# Patient Record
Sex: FEMALE | Race: WHITE | NOT HISPANIC OR LATINO | Employment: OTHER | URBAN - METROPOLITAN AREA
[De-identification: names, ages, dates, MRNs, and addresses within clinical notes are randomized per-mention and may not be internally consistent; named-entity substitution may affect disease eponyms.]

---

## 2017-04-11 ENCOUNTER — OFFICE VISIT (OUTPATIENT)
Dept: CARDIOLOGY | Facility: CLINIC | Age: 67
End: 2017-04-11

## 2017-04-11 VITALS
WEIGHT: 261.9 LBS | BODY MASS INDEX: 48.2 KG/M2 | SYSTOLIC BLOOD PRESSURE: 124 MMHG | HEART RATE: 52 BPM | DIASTOLIC BLOOD PRESSURE: 70 MMHG | HEIGHT: 62 IN

## 2017-04-11 DIAGNOSIS — I10 ESSENTIAL HYPERTENSION: ICD-10-CM

## 2017-04-11 DIAGNOSIS — R00.1 SINUS BRADYCARDIA: Primary | ICD-10-CM

## 2017-04-11 DIAGNOSIS — R60.9 LIPOEDEMA: ICD-10-CM

## 2017-04-11 DIAGNOSIS — E66.01 MORBID OBESITY WITH BMI OF 45.0-49.9, ADULT (HCC): ICD-10-CM

## 2017-04-11 PROCEDURE — 93000 ELECTROCARDIOGRAM COMPLETE: CPT | Performed by: INTERNAL MEDICINE

## 2017-04-11 PROCEDURE — 99213 OFFICE O/P EST LOW 20 MIN: CPT | Performed by: INTERNAL MEDICINE

## 2017-04-11 RX ORDER — LISINOPRIL 2.5 MG/1
TABLET ORAL 2 TIMES DAILY
COMMUNITY
Start: 2017-01-23 | End: 2017-11-02

## 2017-04-11 RX ORDER — LEVOTHYROXINE SODIUM 112 UG/1
TABLET ORAL DAILY
Status: ON HOLD | COMMUNITY
Start: 2017-01-23 | End: 2017-11-17

## 2017-04-11 NOTE — PROGRESS NOTES
Date of Office Visit: 2017  Encounter Provider: Mir Martinez MD  Place of Service: [facility] CARDIOLOGY  Patient Name: Norma Ro  :1950    Chief Complaint   Patient presents with   • Hypertension   :     HPI: Norma Ro is a 66 y.o. female who presents today to follow-up.  She saw Dr. Adhikari in 2014 for preoperative risk assessment prior to hernia repair; he recommended no testing as she was aysmptomatic. Her pulse was 66 bpm at that time.     I met her in 2016 for the evaluation of sinus bradycardia.  She has hypothyroidism, in in 2016 she was hyperthyroid on her levothyroxine. The dose was decreased and her TSH jumped to 21 in mid 2016. Around that time, she also became concerned about her heart rate; a Holter monitor showed an average pulse of 58 bpm, with a range of 38-94. There were extremely rare ectopics, and no pauses.      She was then started on lisinopril for hypertension in 2016.  She has CKD and follows with Dr. Levine for this.     She continues to check her BP and HR regularly at home.  Her SBP ranges from 112 - 159, although her average is definitely between 120 and 130 mm Hg.  Her pulse ranges from 43 to 80, with an average heart rate in the 50's.  She'll sometimes get lightheaded if she stands up quickly. She has not passed out. She denies palpitations, orthopnea, or chest pain. She has chronic mild edema but has large varicose veins (and is morbidly obese). She is dyspneic with physical activity.  She was seen in the vein care center in 2016 and she declined a venous doppler (to check for reflux).  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  Compression hose were recommended but she couldn't wear them due to leg size; she has been using Copper compression sleeves.    She is on her feet at work, for up to 9 hours a day, 3-5 days per week.  Her symptoms are stable (mild lightheadedness, leg swelling).      Past  Medical History:   Diagnosis Date   • Anemia    • Chronic kidney disease    • Depression    • GERD (gastroesophageal reflux disease)    • History of cardiovascular stress test     normal Cardiolite 9/2016   • Hypertension    • Hypoglycemia     Rebound hypoglycemia   • Hypothyroidism    • Incisional hernia     s/p surgical repair, 2014   • Morbid obesity     s/p remote gastric bypass   • Sinus bradycardia    • Varicose veins        Past Surgical History:   Procedure Laterality Date   • ANKLE ARTHROSCOPY     • CARPAL TUNNEL RELEASE     • CHOLECYSTECTOMY     • EXTERNAL FIXATOR APPLICATION     • GASTRIC BYPASS     • STOMACH SURGERY         Social History     Social History   • Marital status: Single     Spouse name: N/A   • Number of children: N/A   • Years of education: N/A     Occupational History   • Not on file.     Social History Main Topics   • Smoking status: Never Smoker   • Smokeless tobacco: Never Used   • Alcohol use No   • Drug use: No   • Sexual activity: Not on file     Other Topics Concern   • Not on file     Social History Narrative       Family History   Problem Relation Age of Onset   • Aortic aneurysm Father      abdominal   • Heart disease Father    • Hypertension Brother    • Stroke Brother    • Heart disease Maternal Grandfather    • Stroke Paternal Grandmother    • Heart disease Paternal Grandfather        Review of Systems   Cardiovascular: Positive for leg swelling.   Musculoskeletal: Positive for back pain.   Neurological: Positive for light-headedness.   All other systems reviewed and are negative.      Allergies   Allergen Reactions   • Morphine Itching   • Baclofen Dizziness and Arrhythmia   • Etodolac Hives   • Metronidazole Other (See Comments)     headaches   • Nitrofurantoin Other (See Comments)     headaches   • Tetracyclines & Related Hives         Current Outpatient Prescriptions:   •  B Complex-C-E-Zn (B COMPLEX-C-E-ZINC) tablet, Take 1 tablet by mouth daily., Disp: , Rfl:   •   "Cholecalciferol (VITAMIN D PO), Take 50,000 Int'l Units by mouth see administration instructions. Take 1x a month, Disp: , Rfl:   •  cyclobenzaprine (FLEXERIL) 10 MG tablet, Take 10 mg by mouth every 8 (eight) hours. As needed, Disp: , Rfl:   •  diphenhydrAMINE (BENADRYL) 25 mg capsule, Take 25 mg by mouth every 6 (six) hours as needed for itching., Disp: , Rfl:   •  escitalopram (LEXAPRO) 10 MG tablet, Take 20 mg by mouth every night., Disp: , Rfl:   •  famotidine (PEPCID) 10 MG tablet, Take 10 mg by mouth 2 (Two) Times a Day As Needed for heartburn., Disp: , Rfl:   •  levothyroxine (SYNTHROID, LEVOTHROID) 112 MCG tablet, Daily., Disp: , Rfl:   •  levothyroxine (SYNTHROID, LEVOTHROID) 75 MCG tablet, Take 1 tablet by mouth daily., Disp: 30 tablet, Rfl: 1  •  lisinopril (PRINIVIL,ZESTRIL) 2.5 MG tablet, 2 (Two) Times a Day., Disp: , Rfl:   •  Multiple Vitamin (MULTI-VITAMIN PO), Take  by mouth., Disp: , Rfl:   •  oxybutynin (DITROPAN) 5 MG tablet, Take 5 mg by mouth every night., Disp: , Rfl:   •  Red Yeast Rice Extract 600 MG capsule, Take 600 mg by mouth daily., Disp: , Rfl:   •  traMADol (ULTRAM) 50 MG tablet, Take 50 mg by mouth every 8 (eight) hours as needed., Disp: , Rfl:      Objective:     Vitals:    04/11/17 1005   BP: 124/70   Pulse: 52   Weight: 261 lb 14.4 oz (119 kg)   Height: 62\" (157.5 cm)     Body mass index is 47.9 kg/(m^2).    Physical Exam   Constitutional: She is oriented to person, place, and time.   Obese   HENT:   Head: Normocephalic.   Nose: Nose normal.   Mouth/Throat: Oropharynx is clear and moist.   Eyes: Conjunctivae and EOM are normal. Pupils are equal, round, and reactive to light.   Neck: Normal range of motion.   Cannot assess for JVD due to habitus   Cardiovascular: Regular rhythm, normal heart sounds and intact distal pulses.  Bradycardia present.    No murmur heard.  Pulmonary/Chest: Effort normal.   Abdominal: Soft.   Obesity limits abdominal exam   Musculoskeletal: Normal range " of motion. She exhibits edema (lipedema, no pitting).   Neurological: She is alert and oriented to person, place, and time. No cranial nerve deficit.   Skin: Skin is warm and dry. No rash noted.   Psychiatric: She has a normal mood and affect. Her behavior is normal. Judgment and thought content normal.   Vitals reviewed.        ECG 12 Lead  Date/Time: 4/11/2017 2:01 PM  Performed by: MIR MARTINEZ  Authorized by: MIR MARTINEZ   Comparison: compared with previous ECG   Similar to previous ECG  Rhythm: sinus bradycardia  Conduction: conduction normal  ST Segments: ST segments normal  T Waves: T waves normal  QRS axis: normal  Other: no other findings  Clinical impression: normal ECG              Assessment:       Diagnosis Plan   1. Sinus bradycardia     2. Essential hypertension     3. Lipoedema            Plan:       1.  She has relatively mild sinus bradycardia without symptoms.  She still has good heart rate variability.  She does not require a pacemaker at this point.  She may in the future, as she likely has some degree of sick sinus syndrome, but she doesn't at this time.    2.  Her BP is generally controlled; I will defer this to Dr. Levine.    3.  Her leg swelling is nonpitting and is due to morbid obesity, lipedema, and probable venous insufficiency.  She has been wearing her compression, which I think is the best option for her.      Sincerely,       Mir Martinez MD

## 2017-10-17 ENCOUNTER — OFFICE VISIT (OUTPATIENT)
Dept: CARDIOLOGY | Facility: CLINIC | Age: 67
End: 2017-10-17

## 2017-10-17 VITALS
DIASTOLIC BLOOD PRESSURE: 92 MMHG | HEIGHT: 62 IN | BODY MASS INDEX: 47.48 KG/M2 | SYSTOLIC BLOOD PRESSURE: 130 MMHG | WEIGHT: 258 LBS | HEART RATE: 52 BPM

## 2017-10-17 DIAGNOSIS — R06.09 DYSPNEA ON EXERTION: ICD-10-CM

## 2017-10-17 DIAGNOSIS — I10 ESSENTIAL HYPERTENSION: ICD-10-CM

## 2017-10-17 DIAGNOSIS — R00.1 SINUS BRADYCARDIA: ICD-10-CM

## 2017-10-17 DIAGNOSIS — R07.89 ATYPICAL CHEST PAIN: Primary | ICD-10-CM

## 2017-10-17 PROCEDURE — 93000 ELECTROCARDIOGRAM COMPLETE: CPT | Performed by: INTERNAL MEDICINE

## 2017-10-17 PROCEDURE — 99214 OFFICE O/P EST MOD 30 MIN: CPT | Performed by: INTERNAL MEDICINE

## 2017-10-17 RX ORDER — GREEN TEA/HOODIA GORDONII 315-12.5MG
CAPSULE ORAL
COMMUNITY
Start: 2017-05-15 | End: 2018-10-23

## 2017-10-17 RX ORDER — ESCITALOPRAM OXALATE 20 MG/1
TABLET ORAL DAILY
COMMUNITY
Start: 2017-09-29 | End: 2019-05-22

## 2017-10-17 NOTE — PROGRESS NOTES
"Date of Office Visit: 2017  Encounter Provider: Mir Martinez MD  Place of Service: [facility] CARDIOLOGY  Patient Name: Norma Ro  :1950    Chief Complaint   Patient presents with   • Slow Heart Rate      6 MONTH FOLLOW UP    • Chest Pain     \"LAST 5 -10 MIN\"   :     HPI: Norma Ro is a 67 y.o. female who presents today to follow-up.  She initially saw Dr. Adhikari in 2014 for preoperative risk assessment prior to hernia repair; he recommended no testing as she was aysmptomatic. Her pulse was 66 bpm at that time.     I met her in 2016 for the evaluation of sinus bradycardia.  She has hypothyroidism, and in 2016 she was hyperthyroid on her levothyroxine. The dose was decreased and her TSH jumped to 21 in mid 2016. Around that time, she also became concerned about her heart rate; a Holter monitor showed an average pulse of 58 bpm, with a range of 38-94. There were extremely rare ectopics, and no pauses.      She was then started on lisinopril for hypertension in 2016.  She has CKD and follows with Dr. Levine for this.     She'll sometimes get lightheaded if she stands up quickly. She has not passed out. She denies palpitations, orthopnea, or chest pain. She has chronic mild edema but has large varicose veins (and is morbidly obese). She was seen in the vein care center in 2016 and she declined a venous doppler (to check for reflux).  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  Compression hose were recommended but she couldn't wear them due to leg size; she has been using Copper compression sleeves.    She has chronic dyspnea with minimal activity but this is unchanged. She had an episode of intense chest pain last week that was in the right-to-mid chest and radiated around the right side to her back.  It lasted 5-10 minutes and went away.  She can only describe it as a \"pain\" but it was fairly severe.  It went away on its " own.    Past Medical History:   Diagnosis Date   • Anemia    • Chronic kidney disease    • Depression    • GERD (gastroesophageal reflux disease)    • History of cardiovascular stress test     normal Cardiolite 9/2016   • Hypertension    • Hypoglycemia     Rebound hypoglycemia   • Hypothyroidism    • Incisional hernia     s/p surgical repair, 2014   • Morbid obesity     s/p remote gastric bypass   • Sinus bradycardia    • Varicose veins        Past Surgical History:   Procedure Laterality Date   • ANKLE ARTHROSCOPY     • CARPAL TUNNEL RELEASE     • CHOLECYSTECTOMY     • EXTERNAL FIXATOR APPLICATION     • GASTRIC BYPASS     • STOMACH SURGERY         Social History     Social History   • Marital status: Single     Spouse name: N/A   • Number of children: N/A   • Years of education: N/A     Occupational History   • Not on file.     Social History Main Topics   • Smoking status: Never Smoker   • Smokeless tobacco: Never Used   • Alcohol use No   • Drug use: No   • Sexual activity: Not on file     Other Topics Concern   • Not on file     Social History Narrative       Family History   Problem Relation Age of Onset   • Aortic aneurysm Father      abdominal   • Heart disease Father    • Hypertension Brother    • Stroke Brother    • Heart disease Maternal Grandfather    • Stroke Paternal Grandmother    • Heart disease Paternal Grandfather        Review of Systems   Cardiovascular: Positive for chest pain and leg swelling.   Respiratory: Positive for shortness of breath.    Musculoskeletal: Positive for back pain.   Neurological: Positive for light-headedness.   All other systems reviewed and are negative.      Allergies   Allergen Reactions   • Morphine Itching   • Baclofen Dizziness and Arrhythmia   • Etodolac Hives   • Metronidazole Other (See Comments)     headaches   • Nitrofurantoin Other (See Comments)     headaches   • Other      LOTRIN     • Tetracyclines & Related Hives         Current Outpatient Prescriptions:   •  " B Complex-C-E-Zn (B COMPLEX-C-E-ZINC) tablet, Take 1 tablet by mouth daily., Disp: , Rfl:   •  Cholecalciferol (VITAMIN D PO), Take 50,000 Int'l Units by mouth see administration instructions. Take 1x a month, Disp: , Rfl:   •  cyclobenzaprine (FLEXERIL) 10 MG tablet, Take 10 mg by mouth every 8 (eight) hours. As needed, Disp: , Rfl:   •  diphenhydrAMINE (BENADRYL) 25 mg capsule, Take 25 mg by mouth every 6 (six) hours as needed for itching., Disp: , Rfl:   •  escitalopram (LEXAPRO) 20 MG tablet, Daily., Disp: , Rfl:   •  famotidine (PEPCID) 10 MG tablet, Take 10 mg by mouth 2 (Two) Times a Day As Needed for heartburn., Disp: , Rfl:   •  levothyroxine (SYNTHROID, LEVOTHROID) 112 MCG tablet, Daily., Disp: , Rfl:   •  lisinopril (PRINIVIL,ZESTRIL) 2.5 MG tablet, 2 (Two) Times a Day., Disp: , Rfl:   •  Multiple Vitamin (MULTI-VITAMIN PO), Take  by mouth., Disp: , Rfl:   •  Multiple Vitamins-Minerals (HAIR/SKIN/NAILS/BIOTIN) tablet, , Disp: , Rfl:   •  oxybutynin (DITROPAN) 5 MG tablet, Take 5 mg by mouth every night., Disp: , Rfl:   •  Red Yeast Rice Extract 600 MG capsule, Take 600 mg by mouth daily., Disp: , Rfl:   •  traMADol (ULTRAM) 50 MG tablet, Take 50 mg by mouth every 8 (eight) hours as needed., Disp: , Rfl:      Objective:     Vitals:    10/17/17 1058   BP: 130/92   BP Location: Left arm   Pulse: 52   Weight: 258 lb (117 kg)   Height: 62\" (157.5 cm)     Body mass index is 47.19 kg/(m^2).    Physical Exam   Constitutional: She is oriented to person, place, and time.   Obese   HENT:   Head: Normocephalic.   Nose: Nose normal.   Mouth/Throat: Oropharynx is clear and moist.   Eyes: Conjunctivae and EOM are normal. Pupils are equal, round, and reactive to light.   Neck: Normal range of motion.   Cannot assess for JVD due to habitus   Cardiovascular: Regular rhythm, normal heart sounds and intact distal pulses.  Bradycardia present.    No murmur heard.  Pulmonary/Chest: Effort normal.   Abdominal: Soft.   Obesity " limits abdominal exam   Musculoskeletal: Normal range of motion. She exhibits edema (lipedema, no pitting).   Neurological: She is alert and oriented to person, place, and time. No cranial nerve deficit.   Skin: Skin is warm and dry. No rash noted.   Psychiatric: She has a normal mood and affect. Her behavior is normal. Judgment and thought content normal.   Vitals reviewed.        ECG 12 Lead  Date/Time: 10/17/2017 12:46 PM  Performed by: MIR MARTINEZ  Authorized by: MIR MARTINEZ   Comparison: compared with previous ECG   Similar to previous ECG  Rhythm: sinus bradycardia  Conduction: conduction normal  ST Segments: ST segments normal  T Waves: T waves normal  QRS axis: normal  Other: no other findings  Clinical impression: normal ECG              Assessment:       Diagnosis Plan   1. Atypical chest pain  Adult Transthoracic Echo Complete W/ Cont if Necessary Per Protocol    Stress Test With Myocardial Perfusion - Two Day   2. Dyspnea on exertion  Adult Transthoracic Echo Complete W/ Cont if Necessary Per Protocol    Stress Test With Myocardial Perfusion - Two Day   3. Sinus bradycardia     4. Essential hypertension            Plan:       1.  She had one episode of chest pain at rest.  I will check a 2-day Cardiolite (of note, she had a normal Cardiolite in September 2016).    2.  She has chronic dyspnea, which is most likely multifactorial.  I will repeat an echocardiogram.    3. She has relatively mild sinus bradycardia without symptoms.  She still has good heart rate variability.  She does not require a pacemaker at this point.  She may in the future, as she likely has some degree of sick sinus syndrome, but she doesn't at this time.    4.  Her BP is generally controlled; I will defer this to Dr. Levine.  I rechecked it today and got 120/85.      Sincerely,       Mir Martinez MD

## 2017-10-20 ENCOUNTER — TELEPHONE (OUTPATIENT)
Dept: CARDIOLOGY | Facility: CLINIC | Age: 67
End: 2017-10-20

## 2017-10-20 NOTE — TELEPHONE ENCOUNTER
Scheduling called and stated that pt's insurance is wanting a peer to peer for the stress and echo. Ref#[id number] and phone is [phone].

## 2017-10-20 NOTE — TELEPHONE ENCOUNTER
I have been on hold for 20 minutes.    It says you can fax the last office note and reference # to [phone]    Let's do that.    KEVIN

## 2017-10-25 NOTE — TELEPHONE ENCOUNTER
S/w Bertha in [employer] and she said that [facility] is still denying test because she had a stress last year and they are only every 2 yrs and they want her to do a TMT instead. They will do a peer to peer if you want to try again. [phone] ref# [id number]

## 2017-10-25 NOTE — TELEPHONE ENCOUNTER
Approved.  Good thing since it's scheduled for tomorrow and the next day.  They are faxing to  office.    [id number]

## 2017-10-26 ENCOUNTER — HOSPITAL ENCOUNTER (OUTPATIENT)
Dept: NUCLEAR MEDICINE | Facility: HOSPITAL | Age: 67
Discharge: HOME | End: 2017-10-26
Attending: INTERNAL MEDICINE

## 2017-10-26 DIAGNOSIS — R07.89 ATYPICAL CHEST PAIN: ICD-10-CM

## 2017-10-26 DIAGNOSIS — R06.09 DYSPNEA ON EXERTION: ICD-10-CM

## 2017-10-26 PROCEDURE — A9500 TC99M SESTAMIBI: HCPCS | Performed by: INTERNAL MEDICINE

## 2017-10-26 PROCEDURE — 78452 HT MUSCLE IMAGE SPECT MULT: CPT | Performed by: INTERNAL MEDICINE

## 2017-10-26 PROCEDURE — 0 TECHNETIUM SESTAMIBI: Performed by: INTERNAL MEDICINE

## 2017-10-26 PROCEDURE — 93018 CV STRESS TEST I&R ONLY: CPT | Performed by: INTERNAL MEDICINE

## 2017-10-26 PROCEDURE — 93016 CV STRESS TEST SUPVJ ONLY: CPT | Performed by: INTERNAL MEDICINE

## 2017-10-26 RX ADMIN — TECHNETIUM TC-99M SESTAMIBI 1 DOSE: 1 INJECTION INTRAVENOUS at 09:30

## 2017-10-27 ENCOUNTER — HOSPITAL ENCOUNTER (OUTPATIENT)
Dept: CARDIOLOGY | Facility: HOSPITAL | Age: 67
Discharge: HOME | End: 2017-10-27
Attending: INTERNAL MEDICINE

## 2017-10-27 ENCOUNTER — APPOINTMENT (OUTPATIENT)
Dept: CARDIOLOGY | Facility: HOSPITAL | Age: 67
End: 2017-10-27
Attending: INTERNAL MEDICINE

## 2017-10-27 ENCOUNTER — HOSPITAL ENCOUNTER (OUTPATIENT)
Dept: NUCLEAR MEDICINE | Facility: HOSPITAL | Age: 67
Discharge: HOME | End: 2017-10-27
Attending: INTERNAL MEDICINE

## 2017-10-27 VITALS
BODY MASS INDEX: 47.48 KG/M2 | DIASTOLIC BLOOD PRESSURE: 73 MMHG | SYSTOLIC BLOOD PRESSURE: 154 MMHG | RESPIRATION RATE: 18 BRPM | OXYGEN SATURATION: 100 % | HEART RATE: 52 BPM | HEIGHT: 62 IN | WEIGHT: 258 LBS

## 2017-10-27 DIAGNOSIS — R06.09 DYSPNEA ON EXERTION: ICD-10-CM

## 2017-10-27 DIAGNOSIS — R07.89 ATYPICAL CHEST PAIN: ICD-10-CM

## 2017-10-27 LAB
BH CV ECHO MEAS - ACS: 2 CM
BH CV ECHO MEAS - AO MAX PG (FULL): 5.3 MMHG
BH CV ECHO MEAS - AO MAX PG: 10.6 MMHG
BH CV ECHO MEAS - AO MEAN PG (FULL): 3 MMHG
BH CV ECHO MEAS - AO MEAN PG: 6 MMHG
BH CV ECHO MEAS - AO ROOT AREA (BSA CORRECTED): 1.5
BH CV ECHO MEAS - AO ROOT AREA: 8 CM^2
BH CV ECHO MEAS - AO ROOT DIAM: 3.2 CM
BH CV ECHO MEAS - AO V2 MAX: 163 CM/SEC
BH CV ECHO MEAS - AO V2 MEAN: 110 CM/SEC
BH CV ECHO MEAS - AO V2 VTI: 39.8 CM
BH CV ECHO MEAS - AVA(I,A): 2.5 CM^2
BH CV ECHO MEAS - AVA(I,D): 2.5 CM^2
BH CV ECHO MEAS - AVA(V,A): 2.4 CM^2
BH CV ECHO MEAS - AVA(V,D): 2.4 CM^2
BH CV ECHO MEAS - BSA(HAYCOCK): 2.3 M^2
BH CV ECHO MEAS - BSA: 2.1 M^2
BH CV ECHO MEAS - BZI_BMI: 47.2 KILOGRAMS/M^2
BH CV ECHO MEAS - BZI_METRIC_HEIGHT: 157.5 CM
BH CV ECHO MEAS - BZI_METRIC_WEIGHT: 117 KG
BH CV ECHO MEAS - CONTRAST EF (2CH): 63.8 ML/M^2
BH CV ECHO MEAS - CONTRAST EF 4CH: 65 ML/M^2
BH CV ECHO MEAS - EDV(CUBED): 109.9 ML
BH CV ECHO MEAS - EDV(MOD-SP2): 93.7 ML
BH CV ECHO MEAS - EDV(MOD-SP4): 87.7 ML
BH CV ECHO MEAS - EDV(TEICH): 107 ML
BH CV ECHO MEAS - EF(CUBED): 78.3 %
BH CV ECHO MEAS - EF(MOD-SP2): 63.8 %
BH CV ECHO MEAS - EF(MOD-SP4): 65 %
BH CV ECHO MEAS - EF(TEICH): 70.4 %
BH CV ECHO MEAS - ESV(CUBED): 23.9 ML
BH CV ECHO MEAS - ESV(MOD-SP2): 33.9 ML
BH CV ECHO MEAS - ESV(MOD-SP4): 30.7 ML
BH CV ECHO MEAS - ESV(TEICH): 31.7 ML
BH CV ECHO MEAS - FS: 39.9 %
BH CV ECHO MEAS - IVS/LVPW: 1.2
BH CV ECHO MEAS - IVSD: 0.93 CM
BH CV ECHO MEAS - LA DIMENSION: 3.8 CM
BH CV ECHO MEAS - LA/AO: 1.2
BH CV ECHO MEAS - LAT PEAK E' VEL: 10 CM/SEC
BH CV ECHO MEAS - LV DIASTOLIC VOL/BSA (35-75): 41.2 ML/M^2
BH CV ECHO MEAS - LV MASS(C)D: 140.4 GRAMS
BH CV ECHO MEAS - LV MASS(C)DI: 65.9 GRAMS/M^2
BH CV ECHO MEAS - LV MAX PG: 5.3 MMHG
BH CV ECHO MEAS - LV MEAN PG: 3 MMHG
BH CV ECHO MEAS - LV SYSTOLIC VOL/BSA (12-30): 14.4 ML/M^2
BH CV ECHO MEAS - LV V1 MAX: 115 CM/SEC
BH CV ECHO MEAS - LV V1 MEAN: 76.9 CM/SEC
BH CV ECHO MEAS - LV V1 VTI: 28.4 CM
BH CV ECHO MEAS - LVIDD: 4.8 CM
BH CV ECHO MEAS - LVIDS: 2.9 CM
BH CV ECHO MEAS - LVLD AP2: 7.8 CM
BH CV ECHO MEAS - LVLD AP4: 7.9 CM
BH CV ECHO MEAS - LVLS AP2: 5.9 CM
BH CV ECHO MEAS - LVLS AP4: 6.4 CM
BH CV ECHO MEAS - LVOT AREA (M): 3.5 CM^2
BH CV ECHO MEAS - LVOT AREA: 3.5 CM^2
BH CV ECHO MEAS - LVOT DIAM: 2.1 CM
BH CV ECHO MEAS - LVPWD: 0.8 CM
BH CV ECHO MEAS - MED PEAK E' VEL: 9 CM/SEC
BH CV ECHO MEAS - MR MAX PG: 99.6 MMHG
BH CV ECHO MEAS - MR MAX VEL: 499 CM/SEC
BH CV ECHO MEAS - MV A DUR: 0.21 SEC
BH CV ECHO MEAS - MV A MAX VEL: 152 CM/SEC
BH CV ECHO MEAS - MV DEC SLOPE: 821 CM/SEC^2
BH CV ECHO MEAS - MV DEC TIME: 0.2 SEC
BH CV ECHO MEAS - MV E MAX VEL: 157 CM/SEC
BH CV ECHO MEAS - MV E/A: 1
BH CV ECHO MEAS - MV MAX PG: 12.8 MMHG
BH CV ECHO MEAS - MV MEAN PG: 4 MMHG
BH CV ECHO MEAS - MV P1/2T MAX VEL: 174 CM/SEC
BH CV ECHO MEAS - MV P1/2T: 62.1 MSEC
BH CV ECHO MEAS - MV V2 MAX: 179 CM/SEC
BH CV ECHO MEAS - MV V2 MEAN: 92.9 CM/SEC
BH CV ECHO MEAS - MV V2 VTI: 51.3 CM
BH CV ECHO MEAS - MVA P1/2T LCG: 1.3 CM^2
BH CV ECHO MEAS - MVA(P1/2T): 3.5 CM^2
BH CV ECHO MEAS - MVA(VTI): 1.9 CM^2
BH CV ECHO MEAS - PA ACC TIME: 0.14 SEC
BH CV ECHO MEAS - PA MAX PG (FULL): 3.7 MMHG
BH CV ECHO MEAS - PA MAX PG: 5.1 MMHG
BH CV ECHO MEAS - PA PR(ACCEL): 15.6 MMHG
BH CV ECHO MEAS - PA V2 MAX: 113 CM/SEC
BH CV ECHO MEAS - PULM A REVS DUR: 0.17 SEC
BH CV ECHO MEAS - PULM A REVS VEL: 43.5 CM/SEC
BH CV ECHO MEAS - PULM DIAS VEL: 57.2 CM/SEC
BH CV ECHO MEAS - PULM S/D: 1.7
BH CV ECHO MEAS - PULM SYS VEL: 97.1 CM/SEC
BH CV ECHO MEAS - PVA(V,A): 1.8 CM^2
BH CV ECHO MEAS - PVA(V,D): 1.8 CM^2
BH CV ECHO MEAS - QP/QS: 0.52
BH CV ECHO MEAS - RAP SYSTOLE: 8 MMHG
BH CV ECHO MEAS - RV MAX PG: 1.4 MMHG
BH CV ECHO MEAS - RV MEAN PG: 1 MMHG
BH CV ECHO MEAS - RV V1 MAX: 59.9 CM/SEC
BH CV ECHO MEAS - RV V1 MEAN: 43.4 CM/SEC
BH CV ECHO MEAS - RV V1 VTI: 14.8 CM
BH CV ECHO MEAS - RVOT AREA: 3.5 CM^2
BH CV ECHO MEAS - RVOT DIAM: 2.1 CM
BH CV ECHO MEAS - RVSP: 29 MMHG
BH CV ECHO MEAS - SI(AO): 150.3 ML/M^2
BH CV ECHO MEAS - SI(CUBED): 40.4 ML/M^2
BH CV ECHO MEAS - SI(LVOT): 46.2 ML/M^2
BH CV ECHO MEAS - SI(MOD-SP2): 28.1 ML/M^2
BH CV ECHO MEAS - SI(MOD-SP4): 26.8 ML/M^2
BH CV ECHO MEAS - SI(TEICH): 35.4 ML/M^2
BH CV ECHO MEAS - SV(AO): 320.1 ML
BH CV ECHO MEAS - SV(CUBED): 86 ML
BH CV ECHO MEAS - SV(LVOT): 98.4 ML
BH CV ECHO MEAS - SV(MOD-SP2): 59.8 ML
BH CV ECHO MEAS - SV(MOD-SP4): 57 ML
BH CV ECHO MEAS - SV(RVOT): 51.3 ML
BH CV ECHO MEAS - SV(TEICH): 75.3 ML
BH CV ECHO MEAS - TAPSE (>1.6): 2.8 CM2
BH CV ECHO MEAS - TR MAX VEL: 229 CM/SEC
BH CV NUCLEAR PRIOR STUDY: 3
BH CV STRESS BP STAGE 1: NORMAL
BH CV STRESS BP STAGE 2: NORMAL
BH CV STRESS BP STAGE 3: NORMAL
BH CV STRESS BP STAGE 4: NORMAL
BH CV STRESS BP STAGE 5: NORMAL
BH CV STRESS COMMENTS STAGE 1: NORMAL
BH CV STRESS COMMENTS STAGE 2: NORMAL
BH CV STRESS DOSE REGADENOSON STAGE 1: 0.4
BH CV STRESS DURATION MIN STAGE 1: 0
BH CV STRESS DURATION MIN STAGE 2: 4
BH CV STRESS DURATION SEC STAGE 1: 15
BH CV STRESS DURATION SEC STAGE 2: 0
BH CV STRESS HR STAGE 1: 68
BH CV STRESS HR STAGE 2: 89
BH CV STRESS HR STAGE 3: 86
BH CV STRESS HR STAGE 4: 84
BH CV STRESS HR STAGE 5: 75
BH CV STRESS O2 STAGE 1: 97
BH CV STRESS O2 STAGE 2: 94
BH CV STRESS O2 STAGE 3: 99
BH CV STRESS O2 STAGE 5: 98
BH CV STRESS PROTOCOL 1: NORMAL
BH CV STRESS RECOVERY BP: NORMAL MMHG
BH CV STRESS RECOVERY HR: 75 BPM
BH CV STRESS STAGE 1: 1
BH CV STRESS STAGE 2: 2
BH CV STRESS STAGE 3: 3
BH CV STRESS STAGE 4: 4
BH CV STRESS STAGE 5: 5
BH CV VAS BP RIGHT ARM: NORMAL MMHG
BH CV XLRA - RV BASE: 3.8 CM
BH CV XLRA - RV LENGTH: 7.3 CM
BH CV XLRA - RV MID: 1.9 CM
BH CV XLRA - TDI S': 14 CM/SEC
E/E' RATIO: 17
LEFT ATRIUM VOLUME INDEX: 35 ML/M2
LEFT ATRIUM VOLUME: 68 CM3
LV EF NUC BP: 54 %
MAXIMAL PREDICTED HEART RATE: 151 BPM
PERCENT MAX PREDICTED HR: 58.94 %
STRESS PERCENT HR: 69 %
STRESS POST ESTIMATED WORKLOAD: 1 METS
STRESS POST EXERCISE DUR SEC: 30 SEC
STRESS POST PEAK BP: NORMAL MMHG
STRESS POST PEAK HR: 89 BPM
STRESS TARGET HR: 128 BPM

## 2017-10-27 PROCEDURE — 93017 CV STRESS TEST TRACING ONLY: CPT

## 2017-10-27 PROCEDURE — 0 TECHNETIUM SESTAMIBI: Performed by: INTERNAL MEDICINE

## 2017-10-27 PROCEDURE — 93306 TTE W/DOPPLER COMPLETE: CPT

## 2017-10-27 PROCEDURE — 0399T ADULT TRANSTHORACIC ECHO COMPLETE W/ CONT IF NECESSARY PER PROTOCOL: CPT | Performed by: INTERNAL MEDICINE

## 2017-10-27 PROCEDURE — 78452 HT MUSCLE IMAGE SPECT MULT: CPT

## 2017-10-27 PROCEDURE — A9500 TC99M SESTAMIBI: HCPCS | Performed by: INTERNAL MEDICINE

## 2017-10-27 PROCEDURE — 93306 TTE W/DOPPLER COMPLETE: CPT | Performed by: INTERNAL MEDICINE

## 2017-10-27 PROCEDURE — 0399T HC MYOCARDL STRAIN IMAG QUAN ASSMT PER SESS: CPT

## 2017-10-27 PROCEDURE — 25010000002 REGADENOSON 0.4 MG/5ML SOLUTION: Performed by: INTERNAL MEDICINE

## 2017-10-27 RX ADMIN — TECHNETIUM TC-99M SESTAMIBI 1 DOSE: 1 INJECTION INTRAVENOUS at 10:45

## 2017-10-27 RX ADMIN — REGADENOSON 0.4 MG: 0.08 INJECTION, SOLUTION INTRAVENOUS at 10:45

## 2017-10-30 ENCOUNTER — APPOINTMENT (OUTPATIENT)
Dept: CARDIOLOGY | Facility: HOSPITAL | Age: 67
End: 2017-10-30
Attending: INTERNAL MEDICINE

## 2017-10-30 DIAGNOSIS — R07.89 OTHER CHEST PAIN: Primary | ICD-10-CM

## 2017-11-02 ENCOUNTER — ANESTHESIA EVENT (OUTPATIENT)
Dept: PERIOP | Facility: HOSPITAL | Age: 67
End: 2017-11-02

## 2017-11-02 RX ORDER — LISINOPRIL 2.5 MG/1
2.5 TABLET ORAL DAILY
COMMUNITY
End: 2018-10-23 | Stop reason: ALTCHOICE

## 2017-11-02 RX ORDER — SULFAMETHOXAZOLE AND TRIMETHOPRIM 400; 80 MG/1; MG/1
1 TABLET ORAL 2 TIMES DAILY
COMMUNITY
Start: 2017-10-28 | End: 2017-11-04

## 2017-11-03 ENCOUNTER — ANESTHESIA (OUTPATIENT)
Dept: PERIOP | Facility: HOSPITAL | Age: 67
End: 2017-11-03

## 2017-11-03 ENCOUNTER — HOSPITAL ENCOUNTER (OUTPATIENT)
Facility: HOSPITAL | Age: 67
Setting detail: HOSPITAL OUTPATIENT SURGERY
Discharge: HOME | End: 2017-11-03
Attending: INTERNAL MEDICINE | Admitting: INTERNAL MEDICINE

## 2017-11-03 ENCOUNTER — ON CAMPUS - OUTPATIENT (OUTPATIENT)
Dept: URBAN - METROPOLITAN AREA HOSPITAL 28 | Facility: HOSPITAL | Age: 67
End: 2017-11-03
Payer: MEDICARE

## 2017-11-03 ENCOUNTER — PREP FOR SURGERY (OUTPATIENT)
Dept: OTHER | Facility: HOSPITAL | Age: 67
End: 2017-11-03

## 2017-11-03 VITALS
WEIGHT: 237.25 LBS | OXYGEN SATURATION: 95 % | SYSTOLIC BLOOD PRESSURE: 107 MMHG | DIASTOLIC BLOOD PRESSURE: 55 MMHG | BODY MASS INDEX: 43.66 KG/M2 | TEMPERATURE: 97.9 F | HEIGHT: 62 IN | HEART RATE: 53 BPM | RESPIRATION RATE: 15 BRPM

## 2017-11-03 DIAGNOSIS — D12.2 BENIGN NEOPLASM OF ASCENDING COLON: ICD-10-CM

## 2017-11-03 DIAGNOSIS — Z12.11 COLON CANCER SCREENING: ICD-10-CM

## 2017-11-03 DIAGNOSIS — Z12.11 ENCOUNTER FOR SCREENING FOR MALIGNANT NEOPLASM OF COLON: ICD-10-CM

## 2017-11-03 PROCEDURE — 45380 COLONOSCOPY AND BIOPSY: CPT | Mod: PT

## 2017-11-03 PROCEDURE — 25010000002 PROPOFOL 10 MG/ML EMULSION: Performed by: NURSE ANESTHETIST, CERTIFIED REGISTERED

## 2017-11-03 RX ORDER — LIDOCAINE HYDROCHLORIDE 10 MG/ML
0.5 INJECTION, SOLUTION EPIDURAL; INFILTRATION; INTRACAUDAL; PERINEURAL ONCE AS NEEDED
Status: COMPLETED | OUTPATIENT
Start: 2017-11-03 | End: 2017-11-03

## 2017-11-03 RX ORDER — MAGNESIUM HYDROXIDE 1200 MG/15ML
LIQUID ORAL AS NEEDED
Status: DISCONTINUED | OUTPATIENT
Start: 2017-11-03 | End: 2017-11-03 | Stop reason: HOSPADM

## 2017-11-03 RX ORDER — SODIUM CHLORIDE 9 MG/ML
40 INJECTION, SOLUTION INTRAVENOUS AS NEEDED
Status: DISCONTINUED | OUTPATIENT
Start: 2017-11-03 | End: 2017-11-03 | Stop reason: HOSPADM

## 2017-11-03 RX ORDER — PROPOFOL 10 MG/ML
VIAL (ML) INTRAVENOUS AS NEEDED
Status: DISCONTINUED | OUTPATIENT
Start: 2017-11-03 | End: 2017-11-03 | Stop reason: SURG

## 2017-11-03 RX ORDER — PROPOFOL 10 MG/ML
VIAL (ML) INTRAVENOUS CONTINUOUS PRN
Status: DISCONTINUED | OUTPATIENT
Start: 2017-11-03 | End: 2017-11-03 | Stop reason: SURG

## 2017-11-03 RX ORDER — GLYCOPYRROLATE 0.2 MG/ML
INJECTION INTRAMUSCULAR; INTRAVENOUS AS NEEDED
Status: DISCONTINUED | OUTPATIENT
Start: 2017-11-03 | End: 2017-11-03 | Stop reason: SURG

## 2017-11-03 RX ORDER — SODIUM CHLORIDE, SODIUM LACTATE, POTASSIUM CHLORIDE, CALCIUM CHLORIDE 600; 310; 30; 20 MG/100ML; MG/100ML; MG/100ML; MG/100ML
9 INJECTION, SOLUTION INTRAVENOUS CONTINUOUS PRN
Status: DISCONTINUED | OUTPATIENT
Start: 2017-11-03 | End: 2017-11-03 | Stop reason: HOSPADM

## 2017-11-03 RX ORDER — SODIUM CHLORIDE 0.9 % (FLUSH) 0.9 %
1-10 SYRINGE (ML) INJECTION AS NEEDED
Status: DISCONTINUED | OUTPATIENT
Start: 2017-11-03 | End: 2017-11-03 | Stop reason: HOSPADM

## 2017-11-03 RX ORDER — LIDOCAINE HYDROCHLORIDE 20 MG/ML
INJECTION, SOLUTION INFILTRATION; PERINEURAL AS NEEDED
Status: DISCONTINUED | OUTPATIENT
Start: 2017-11-03 | End: 2017-11-03 | Stop reason: SURG

## 2017-11-03 RX ADMIN — SODIUM CHLORIDE, POTASSIUM CHLORIDE, SODIUM LACTATE AND CALCIUM CHLORIDE 9 ML/HR: 600; 310; 30; 20 INJECTION, SOLUTION INTRAVENOUS at 08:50

## 2017-11-03 RX ADMIN — PROPOFOL 40 MG: 10 INJECTION, EMULSION INTRAVENOUS at 11:05

## 2017-11-03 RX ADMIN — PROPOFOL 50 MG: 10 INJECTION, EMULSION INTRAVENOUS at 11:00

## 2017-11-03 RX ADMIN — PROPOFOL 50 MG: 10 INJECTION, EMULSION INTRAVENOUS at 10:41

## 2017-11-03 RX ADMIN — SODIUM CHLORIDE, POTASSIUM CHLORIDE, SODIUM LACTATE AND CALCIUM CHLORIDE: 600; 310; 30; 20 INJECTION, SOLUTION INTRAVENOUS at 10:33

## 2017-11-03 RX ADMIN — PROPOFOL 50 MG: 10 INJECTION, EMULSION INTRAVENOUS at 10:50

## 2017-11-03 RX ADMIN — LIDOCAINE HYDROCHLORIDE 0.5 ML: 10 INJECTION, SOLUTION EPIDURAL; INFILTRATION; INTRACAUDAL; PERINEURAL at 08:50

## 2017-11-03 RX ADMIN — LIDOCAINE HYDROCHLORIDE 50 MG: 20 INJECTION, SOLUTION INFILTRATION; PERINEURAL at 10:36

## 2017-11-03 RX ADMIN — PROPOFOL 50 MG: 10 INJECTION, EMULSION INTRAVENOUS at 10:37

## 2017-11-03 RX ADMIN — GLYCOPYRROLATE 0.1 MG: 0.2 INJECTION INTRAMUSCULAR; INTRAVENOUS at 10:36

## 2017-11-03 RX ADMIN — PROPOFOL 100 MCG/KG/MIN: 10 INJECTION, EMULSION INTRAVENOUS at 10:36

## 2017-11-03 NOTE — ANESTHESIA PREPROCEDURE EVALUATION
Anesthesia Evaluation     Patient summary reviewed   no history of anesthetic complications:  NPO Solid Status: > 8 hours  NPO Liquid Status: > 2 hours     Airway   Mallampati: III  TM distance: >3 FB  Neck ROM: full  no difficulty expected  Dental    (+) edentulous    Pulmonary - negative pulmonary ROS and normal exam    breath sounds clear to auscultation  Cardiovascular - normal exam  Exercise tolerance: good (4-7 METS)    Rhythm: regular  Rate: normal    (+) hypertension well controlled,       Neuro/Psych- negative ROS  GI/Hepatic/Renal/Endo    (+) morbid obesity, GERD well controlled, hypothyroidism (stable),     Musculoskeletal (-) negative ROS    Abdominal  - normal exam   Substance History - negative use     OB/GYN          Other                                        Anesthesia Plan    ASA 3     MAC     intravenous induction   Anesthetic plan and risks discussed with patient.

## 2017-11-03 NOTE — PLAN OF CARE
Problem: GI Endoscopy (Adult)  Goal: Signs and Symptoms of Listed Potential Problems Will be Absent or Manageable (GI Endoscopy)  Outcome: Ongoing (interventions implemented as appropriate)    11/03/17 1034   GI Endoscopy   Problems Assessed (GI Endoscopy) all   Problems Present (GI Endoscopy) none

## 2017-11-03 NOTE — PLAN OF CARE
Problem: Patient Care Overview (Adult)  Goal: Plan of Care Review  Outcome: Outcome(s) achieved Date Met:  11/03/17 11/03/17 1126   Coping/Psychosocial Response Interventions   Plan Of Care Reviewed With patient   Patient Care Overview   Progress improving   Outcome Evaluation   Outcome Summary/Follow up Plan vss, waiting to go home

## 2017-11-03 NOTE — H&P
Patient Care Team:  LEWIS Cobb as PCP - General (Family Medicine)    CHIEF COMPLAINT: screening for CRC    HISTORY OF PRESENT ILLNESS:    Lasat exam >10 years ago no family history      Past Medical History:   Diagnosis Date   • Anemia    • Arthritis    • Chronic kidney disease    • Depression    • GERD (gastroesophageal reflux disease)    • History of cardiovascular stress test     normal Cardiolite 9/2016   • Hypertension    • Hypoglycemia     Rebound hypoglycemia   • Hypothyroidism    • Incisional hernia     s/p surgical repair, 2014   • Morbid obesity     s/p remote gastric bypass   • Sinus bradycardia    • UTI (urinary tract infection)    • Varicose veins      Past Surgical History:   Procedure Laterality Date   • ANKLE ARTHROSCOPY     • CARPAL TUNNEL RELEASE     • CHOLECYSTECTOMY     • EXTERNAL FIXATOR APPLICATION     • GASTRIC BYPASS     • STOMACH SURGERY       Family History   Problem Relation Age of Onset   • Aortic aneurysm Father      abdominal   • Heart disease Father    • Hypertension Brother    • Stroke Brother    • Heart disease Maternal Grandfather    • Stroke Paternal Grandmother    • Heart disease Paternal Grandfather      Social History   Substance Use Topics   • Smoking status: Never Smoker   • Smokeless tobacco: Never Used   • Alcohol use Yes      Comment: very seldom     Prescriptions Prior to Admission   Medication Sig Dispense Refill Last Dose   • B Complex-C-E-Zn (B COMPLEX-C-E-ZINC) tablet Take 1 tablet by mouth daily.   11/2/2017 at Unknown time   • Cholecalciferol (VITAMIN D PO) Take 50,000 Int'l Units by mouth see administration instructions. Take 1x a month   11/2/2017 at Unknown time   • cyclobenzaprine (FLEXERIL) 10 MG tablet Take 10 mg by mouth every 8 (eight) hours. As needed   11/2/2017 at Unknown time   • diphenhydrAMINE (BENADRYL) 25 mg capsule Take 25 mg by mouth every 6 (six) hours as needed for itching.   Past Week at Unknown time   • escitalopram (LEXAPRO) 20 MG  "tablet Daily.   11/2/2017 at Unknown time   • famotidine (PEPCID) 10 MG tablet Take 10 mg by mouth 2 (Two) Times a Day As Needed for heartburn.   11/3/2017 at 0100   • levothyroxine (SYNTHROID, LEVOTHROID) 112 MCG tablet Daily.   11/3/2017 at 0600   • lisinopril (PRINIVIL,ZESTRIL) 2.5 MG tablet Take 2.5 mg by mouth Daily.   11/2/2017 at Unknown time   • Multiple Vitamins-Minerals (HAIR/SKIN/NAILS/BIOTIN) tablet    11/2/2017 at Unknown time   • oxybutynin (DITROPAN) 5 MG tablet Take 5 mg by mouth every night.   11/2/2017 at Unknown time   • Red Yeast Rice Extract 600 MG capsule Take 600 mg by mouth daily.   11/2/2017 at Unknown time   • sulfamethoxazole-trimethoprim (BACTRIM,SEPTRA) 400-80 MG tablet Take 1 tablet by mouth 2 (Two) Times a Day. Treatment for uti   11/2/2017 at Unknown time   • traMADol (ULTRAM) 50 MG tablet Take 50 mg by mouth every 8 (eight) hours as needed.   11/2/2017 at Unknown time     Allergies:  Morphine; Baclofen; Etodolac; Metronidazole; Nitrofurantoin; Other; and Tetracyclines & related    REVIEW OF SYSTEMS:  Please see the above history of present illness for pertinent positives and negatives.  The remainder of the patient's systems have been reviewed and are negative.     Vital Signs  Temp:  [99.1 °F (37.3 °C)] 99.1 °F (37.3 °C)  Heart Rate:  [48] 48  Resp:  [16] 16  BP: (130)/(89) 130/89    Flowsheet Rows         First Filed Value    Admission Height  62\" (157.5 cm) Documented at 11/02/2017 1419    Admission Weight  260 lb (118 kg) Documented at 11/02/2017 1419           Physical Exam:  Physical Exam   Constitutional: Patient appears well-developed and well-nourished and in no acute distress   HEENT:   Head: Normocephalic and atraumatic.   Eyes:  Pupils are equal, round, and reactive to light. EOM are intact. Sclera are anicteric and non-injected.  Mouth and Throat: Patient has moist mucous membranes. Oropharynx is clear of any erythema or exudate.     Neck: Neck supple. No JVD present. No " thyromegaly present. No lymphadenopathy present.  Cardiovascular: Regular rate, regular rhythm, S1 normal and S2 normal.  Exam reveals no gallop and no friction rub.  No murmur heard.  Pulmonary/Chest: Lungs are clear to auscultation bilaterally. No respiratory distress. No wheezes. No rhonchi. No rales.   Abdominal: Soft. Bowel sounds are normal. No distension and no mass. There is no hepatosplenomegaly. There is no tenderness.   Musculoskeletal: Normal Muscle tone  Extremities: No edema. Pulses are palpable in all 4 extremities.  Neurological: Patient is alert and oriented to person, place, and time. Cranial nerves II-XII are grossly intact with no focal deficits.  Skin: Skin is warm. No rash noted. Nails show no clubbing.  No cyanosis or erythema.     Results Review:    I reviewed the patient's new clinical results.  Lab Results (most recent)     None          Imaging Results (most recent)     None        reviewed    ECG/EMG Results (most recent)     None        reviewed    Assessment/Plan     Screening for CRC/ Colonoscopy      I discussed the patients findings and my recommendations with patient.     Chevy Zuniga MD  11/03/17  10:25 AM    Time: 10 min prior to procedure.

## 2017-11-03 NOTE — PLAN OF CARE
Problem: Patient Care Overview (Adult)  Goal: Plan of Care Review  Outcome: Ongoing (interventions implemented as appropriate)    11/03/17 0849   Coping/Psychosocial Response Interventions   Plan Of Care Reviewed With patient   Patient Care Overview   Progress improving   Outcome Evaluation   Outcome Summary/Follow up Plan vss, ready for or       Goal: Adult Individualization and Mutuality  Outcome: Ongoing (interventions implemented as appropriate)    Problem: GI Endoscopy (Adult)  Goal: Signs and Symptoms of Listed Potential Problems Will be Absent or Manageable (GI Endoscopy)  Outcome: Ongoing (interventions implemented as appropriate)

## 2017-11-03 NOTE — PLAN OF CARE
Problem: Patient Care Overview (Adult)  Goal: Adult Individualization and Mutuality  Outcome: Outcome(s) achieved Date Met:  11/03/17 11/03/17 0849   Individualization   Patient Specific Preferences goes by frannie

## 2017-11-03 NOTE — BRIEF OP NOTE
COLONOSCOPY  Progress Note    Norma Ro  11/3/2017    Pre-op Diagnosis:   Z12.11       Post-Op Diagnosis Codes:     * Colon polyp [K63.5]    Procedure/CPT® Codes:      Procedure(s):  COLONOSCOPY; polypectomy    Surgeon(s):  Chevy Zuinga MD    Anesthesia: Monitor Anesthesia Care    Staff:   Circulator: Hien Bolden RN  Scrub Person: Kendra Govea    Estimated Blood Loss: none    Urine Voided: * No values recorded between 11/3/2017 10:30 AM and 11/3/2017 11:07 AM *    Specimens:                  ID Type Source Tests Collected by Time Destination   A : ascending polyps X 2 Polyp Large Intestine, Right / Ascending Colon TISSUE EXAM Chevy Zuniga MD 11/3/2017 1100          Drains:           Findings: Difficult colon to Cecum good prep  Polyps (2) Ascending x 2 3-4mm cold Biopsy    Complications: none      Chevy Zuniga MD     Date: 11/3/2017  Time: 11:10 AM

## 2017-11-03 NOTE — OP NOTE
COLONOSCOPY  Procedure Report    Patient Name:  Norma Ro  YOB: 1950    Date of Surgery:  11/3/2017     Indications:  Screening for CRC    Pre-op Diagnosis:   Z12.11    Post-Op Diagnosis Codes:     * Colon polyp [K63.5]         Procedure/CPT® Codes:      Procedure(s):  COLONOSCOPY; polypectomy    Staff:  Surgeon(s):  Chevy Zuniga MD         Anesthesia: Monitor Anesthesia Care    Estimated Blood Loss: none    Specimens:   ID Type Source Tests Collected by Time Destination   A : ascending polyps X 2 Polyp Large Intestine, Right / Ascending Colon TISSUE EXAM Chevy Zuniga MD 11/3/2017 1100        Implants:    Nothing was implanted during the procedure      Description of Procedure: After having signed informed consent, she was brought to the endoscopy suite and placed in the left lateral decubitus position and given her IV sedation. Rectal exam revealed no external lesions, normal anal tone, and no rectal mass. The scope was introduced into the rectum and advanced under direct visualization from the rectum into the sigmoid colon. The sigmoid colon was mildly tortuous, but the scope was able to be advanced through the sigmoid colon, descending colon, with some looping around the hepatic flexure. The scope was reduced using abdominal splinting. The scope could then be advanced through the transverse colon with significant looping still around the hepatic flexure. The scope was reduced again and then advanced using the abdominal pressure of both the nurse and the tech, still with significant looping, to get the scope past the ileocecal valve to the cecum. The preparation of the colon was fairly good. Most residual bowel contents that were opaque liquid could be aspirated. The cecum was easily distended and normal appearing. Within the ascending colon, as the scope was withdrawn, there were 2 diminutive polyps noted. One was 3 and one was 4 mm. They were both biopsied, felt to  be completely removed, and sent together as ascending colon polyps. The scope was withdrawn through the remainder of the colon, examining the colon in circumferential fashion. Again, the colon preparation overall was good. There were no further mucosal lesions, fascial lesions, nor diverticulum noted throughout the remainder of the exam. The scope was withdrawn into the rectum and retroflexed, revealing an intact dentate line and no further mucosal lesions. The scope was deretroflexed and withdrawn. The patient tolerated the procedure well.           Findings:  Difficult colon to Cecum good prep  Polyps (2) Ascending x 2 3-4mm cold Biopsy    Complications: none    Recommendations: results to be called       Chevy Zuniga MD     Date: 11/3/2017  Time: 11:12 AM

## 2017-11-03 NOTE — PLAN OF CARE
Problem: GI Endoscopy (Adult)  Goal: Signs and Symptoms of Listed Potential Problems Will be Absent or Manageable (GI Endoscopy)  Outcome: Outcome(s) achieved Date Met:  11/03/17 11/03/17 1034   GI Endoscopy   Problems Assessed (GI Endoscopy) all   Problems Present (GI Endoscopy) none

## 2017-11-03 NOTE — ANESTHESIA POSTPROCEDURE EVALUATION
Patient: Norma Ro    Procedure Summary     Date Anesthesia Start Anesthesia Stop Room / Location    11/03/17 1033 1108 [facility] / [facility]       Procedure Diagnosis Surgeon Provider    COLONOSCOPY; polypectomy (N/A ) Colon polyp  (Z12.11) MD Griffin Crawley CRNA          Anesthesia Type: MAC  Last vitals  BP   107/55 (11/03/17 1150)   Temp   97.9 °F (36.6 °C) (11/03/17 1113)   Pulse   53 (11/03/17 1150)   Resp   15 (11/03/17 1150)     SpO2   95 % (11/03/17 1150)     Post Anesthesia Care and Evaluation    Patient location during evaluation: bedside  Patient participation: complete - patient participated  Level of consciousness: awake and alert  Pain score: 0  Pain management: adequate  Airway patency: patent  Anesthetic complications: No anesthetic complications    Cardiovascular status: acceptable  Respiratory status: acceptable  Hydration status: acceptable

## 2017-11-08 ENCOUNTER — APPOINTMENT (OUTPATIENT)
Dept: CT IMAGING | Facility: HOSPITAL | Age: 67
End: 2017-11-08
Attending: INTERNAL MEDICINE

## 2017-11-10 LAB
LAB AP CASE REPORT: NORMAL
Lab: NORMAL
PATH REPORT.FINAL DX SPEC: NORMAL

## 2017-11-12 RX ORDER — FERROUS SULFATE 325(65) MG
325 TABLET ORAL EVERY OTHER DAY
COMMUNITY
Start: 2017-01-10

## 2017-11-12 RX ORDER — VITAMIN B COMPLEX
1 CAPSULE ORAL NIGHTLY
COMMUNITY

## 2017-11-16 ENCOUNTER — HOSPITAL ENCOUNTER (OUTPATIENT)
Dept: CT IMAGING | Facility: HOSPITAL | Age: 67
Discharge: HOME | End: 2017-11-16
Attending: INTERNAL MEDICINE | Admitting: INTERNAL MEDICINE

## 2017-11-16 ENCOUNTER — HOSPITAL ENCOUNTER (OUTPATIENT)
Facility: HOSPITAL | Age: 67
Setting detail: OBSERVATION
Discharge: HOME | End: 2017-11-17
Attending: EMERGENCY MEDICINE | Admitting: INTERNAL MEDICINE

## 2017-11-16 VITALS
HEART RATE: 46 BPM | OXYGEN SATURATION: 100 % | RESPIRATION RATE: 16 BRPM | SYSTOLIC BLOOD PRESSURE: 121 MMHG | DIASTOLIC BLOOD PRESSURE: 64 MMHG

## 2017-11-16 DIAGNOSIS — R07.89 OTHER CHEST PAIN: ICD-10-CM

## 2017-11-16 DIAGNOSIS — R53.1 GENERALIZED WEAKNESS: ICD-10-CM

## 2017-11-16 DIAGNOSIS — R00.1 BRADYCARDIA, SINUS: Primary | ICD-10-CM

## 2017-11-16 LAB
ALBUMIN SERPL-MCNC: 3.4 G/DL (ref 3.5–5.2)
ALBUMIN/GLOB SERPL: 1.3 G/DL
ALP SERPL-CCNC: 61 U/L (ref 39–117)
ALT SERPL W P-5'-P-CCNC: 12 U/L (ref 1–33)
ANION GAP SERPL CALCULATED.3IONS-SCNC: 9.8 MMOL/L
AST SERPL-CCNC: 14 U/L (ref 1–32)
BASOPHILS # BLD AUTO: 0.03 10*3/MM3 (ref 0–0.2)
BASOPHILS NFR BLD AUTO: 0.4 % (ref 0–1.5)
BILIRUB SERPL-MCNC: 0.4 MG/DL (ref 0.1–1.2)
BUN BLD-MCNC: 24 MG/DL (ref 8–23)
BUN/CREAT SERPL: 20.3 (ref 7–25)
CALCIUM SPEC-SCNC: 9.5 MG/DL (ref 8.6–10.5)
CHLORIDE SERPL-SCNC: 103 MMOL/L (ref 98–107)
CO2 SERPL-SCNC: 27.2 MMOL/L (ref 22–29)
CREAT BLD-MCNC: 1.18 MG/DL (ref 0.57–1)
DEPRECATED RDW RBC AUTO: 49.9 FL (ref 37–54)
EOSINOPHIL # BLD AUTO: 0.36 10*3/MM3 (ref 0–0.7)
EOSINOPHIL NFR BLD AUTO: 4.7 % (ref 0.3–6.2)
ERYTHROCYTE [DISTWIDTH] IN BLOOD BY AUTOMATED COUNT: 14.6 % (ref 11.7–13)
GFR SERPL CREATININE-BSD FRML MDRD: 45 ML/MIN/1.73
GLOBULIN UR ELPH-MCNC: 2.7 GM/DL
GLUCOSE BLD-MCNC: 88 MG/DL (ref 65–99)
HCT VFR BLD AUTO: 39.6 % (ref 35.6–45.5)
HGB BLD-MCNC: 12.3 G/DL (ref 11.9–15.5)
IMM GRANULOCYTES # BLD: 0.02 10*3/MM3 (ref 0–0.03)
IMM GRANULOCYTES NFR BLD: 0.3 % (ref 0–0.5)
LYMPHOCYTES # BLD AUTO: 2.76 10*3/MM3 (ref 0.9–4.8)
LYMPHOCYTES NFR BLD AUTO: 35.7 % (ref 19.6–45.3)
MCH RBC QN AUTO: 29.4 PG (ref 26.9–32)
MCHC RBC AUTO-ENTMCNC: 31.1 G/DL (ref 32.4–36.3)
MCV RBC AUTO: 94.5 FL (ref 80.5–98.2)
MONOCYTES # BLD AUTO: 0.65 10*3/MM3 (ref 0.2–1.2)
MONOCYTES NFR BLD AUTO: 8.4 % (ref 5–12)
NEUTROPHILS # BLD AUTO: 3.92 10*3/MM3 (ref 1.9–8.1)
NEUTROPHILS NFR BLD AUTO: 50.5 % (ref 42.7–76)
PLATELET # BLD AUTO: 274 10*3/MM3 (ref 140–500)
PMV BLD AUTO: 10.3 FL (ref 6–12)
POTASSIUM BLD-SCNC: 5 MMOL/L (ref 3.5–5.2)
PROT SERPL-MCNC: 6.1 G/DL (ref 6–8.5)
RBC # BLD AUTO: 4.19 10*6/MM3 (ref 3.9–5.2)
SODIUM BLD-SCNC: 140 MMOL/L (ref 136–145)
TROPONIN T SERPL-MCNC: <0.01 NG/ML (ref 0–0.03)
WBC NRBC COR # BLD: 7.74 10*3/MM3 (ref 4.5–10.7)

## 2017-11-16 PROCEDURE — 85025 COMPLETE CBC W/AUTO DIFF WBC: CPT | Performed by: EMERGENCY MEDICINE

## 2017-11-16 PROCEDURE — G0378 HOSPITAL OBSERVATION PER HR: HCPCS

## 2017-11-16 PROCEDURE — 80053 COMPREHEN METABOLIC PANEL: CPT | Performed by: EMERGENCY MEDICINE

## 2017-11-16 PROCEDURE — 84484 ASSAY OF TROPONIN QUANT: CPT | Performed by: EMERGENCY MEDICINE

## 2017-11-16 PROCEDURE — 99284 EMERGENCY DEPT VISIT MOD MDM: CPT

## 2017-11-16 PROCEDURE — 93010 ELECTROCARDIOGRAM REPORT: CPT | Performed by: INTERNAL MEDICINE

## 2017-11-16 PROCEDURE — 93005 ELECTROCARDIOGRAM TRACING: CPT | Performed by: INTERNAL MEDICINE

## 2017-11-16 PROCEDURE — 36415 COLL VENOUS BLD VENIPUNCTURE: CPT

## 2017-11-16 RX ORDER — ESCITALOPRAM OXALATE 20 MG/1
20 TABLET ORAL DAILY
Status: DISCONTINUED | OUTPATIENT
Start: 2017-11-16 | End: 2017-11-17 | Stop reason: HOSPADM

## 2017-11-16 RX ORDER — OXYBUTYNIN CHLORIDE 5 MG/1
5 TABLET ORAL NIGHTLY
Status: DISCONTINUED | OUTPATIENT
Start: 2017-11-16 | End: 2017-11-17 | Stop reason: HOSPADM

## 2017-11-16 RX ORDER — LEVOTHYROXINE SODIUM 112 UG/1
112 TABLET ORAL
Status: DISCONTINUED | OUTPATIENT
Start: 2017-11-17 | End: 2017-11-17 | Stop reason: HOSPADM

## 2017-11-16 RX ORDER — DIPHENHYDRAMINE HCL 25 MG
25 CAPSULE ORAL EVERY 6 HOURS PRN
Status: DISCONTINUED | OUTPATIENT
Start: 2017-11-16 | End: 2017-11-17 | Stop reason: HOSPADM

## 2017-11-16 RX ORDER — ACETAMINOPHEN 325 MG/1
650 TABLET ORAL EVERY 6 HOURS PRN
Status: DISCONTINUED | OUTPATIENT
Start: 2017-11-16 | End: 2017-11-17 | Stop reason: HOSPADM

## 2017-11-16 RX ORDER — CYCLOBENZAPRINE HCL 10 MG
10 TABLET ORAL EVERY 8 HOURS
Status: DISCONTINUED | OUTPATIENT
Start: 2017-11-16 | End: 2017-11-17 | Stop reason: HOSPADM

## 2017-11-16 RX ORDER — ZOLPIDEM TARTRATE 5 MG/1
5 TABLET ORAL NIGHTLY PRN
Status: DISCONTINUED | OUTPATIENT
Start: 2017-11-16 | End: 2017-11-17 | Stop reason: HOSPADM

## 2017-11-16 RX ORDER — FAMOTIDINE 10 MG
10 TABLET ORAL 2 TIMES DAILY PRN
Status: DISCONTINUED | OUTPATIENT
Start: 2017-11-16 | End: 2017-11-17 | Stop reason: HOSPADM

## 2017-11-16 RX ORDER — METOPROLOL TARTRATE 50 MG/1
50 TABLET, FILM COATED ORAL 2 TIMES DAILY
COMMUNITY
End: 2017-11-17 | Stop reason: HOSPADM

## 2017-11-16 RX ORDER — LISINOPRIL 2.5 MG/1
2.5 TABLET ORAL DAILY
Status: DISCONTINUED | OUTPATIENT
Start: 2017-11-16 | End: 2017-11-17 | Stop reason: HOSPADM

## 2017-11-16 RX ORDER — ONDANSETRON 2 MG/ML
4 INJECTION INTRAMUSCULAR; INTRAVENOUS EVERY 6 HOURS PRN
Status: DISCONTINUED | OUTPATIENT
Start: 2017-11-16 | End: 2017-11-17 | Stop reason: HOSPADM

## 2017-11-16 RX ORDER — ALPRAZOLAM 0.25 MG/1
0.25 TABLET ORAL 4 TIMES DAILY PRN
Status: DISCONTINUED | OUTPATIENT
Start: 2017-11-16 | End: 2017-11-17 | Stop reason: HOSPADM

## 2017-11-16 RX ORDER — TRAMADOL HYDROCHLORIDE 50 MG/1
50 TABLET ORAL EVERY 8 HOURS PRN
Status: DISCONTINUED | OUTPATIENT
Start: 2017-11-16 | End: 2017-11-17 | Stop reason: HOSPADM

## 2017-11-16 RX ADMIN — ESCITALOPRAM 20 MG: 20 TABLET, FILM COATED ORAL at 23:05

## 2017-11-16 RX ADMIN — LISINOPRIL 2.5 MG: 2.5 TABLET ORAL at 23:07

## 2017-11-16 RX ADMIN — OXYBUTYNIN CHLORIDE 5 MG: 5 TABLET ORAL at 23:06

## 2017-11-16 NOTE — ED TRIAGE NOTES
"Sent from outpt testing for \"reversal\" of sinus leslie (pt has hx of same but took extra meds and hr in the 30's)  "

## 2017-11-16 NOTE — NURSING NOTE
EKG done.  HR 39 Patient states she feels very week.  Call to Dr. Martinez.  Dr. Martinez wants patient evaluated in the ED to reverse affects of metoprolol given for the exam.  Patient advised.

## 2017-11-16 NOTE — ED PROVIDER NOTES
EMERGENCY DEPARTMENT ENCOUNTER    CHIEF COMPLAINT  Chief Complaint: slow heart rate  History given by: patient  History limited by: nothing  Room Number: 28/28  PMD: LEWIS Cobb      HPI:  Pt is a 67 y.o. female who presents from an outpatient testing facility complaining of bradycardia. Pt was scheduled for a CT of her coronary arteries today and was given metoprolol to take before the imaging. Pt states she took 100 mg oF metoprolol at 01:00 this morning and another 50 mg at 09:00. Pt arrived at the facility and was noted to be bradycardic and sent to the ED before the study was done. Pt states her HR is regularly between 45-55 bpm. Pt c/o generalized weakness as well as mild dyspnea on exertion which is atypical for her.     Onset: gradual  Timing: constant  Quality: bradycardia  Intensity/Severity: moderate  Progression: unchanged  Associated Symptoms: generalized weakness, dyspnea on exertion  Aggravating Factors: metoprolol  Previous Episodes: none  Treatment before arrival: none    PAST MEDICAL HISTORY  Active Ambulatory Problems     Diagnosis Date Noted   • Hypertension    • Sinus bradycardia    • Morbid obesity with BMI of 45.0-49.9, adult 04/11/2017   • Lipoedema 04/11/2017     Resolved Ambulatory Problems     Diagnosis Date Noted   • Chest pain 09/09/2016     Past Medical History:   Diagnosis Date   • Anemia    • Arthritis    • Chronic kidney disease    • Depression    • GERD (gastroesophageal reflux disease)    • History of cardiovascular stress test    • Hypertension    • Hypoglycemia    • Hypothyroidism    • Incisional hernia    • Morbid obesity    • Sinus bradycardia    • UTI (urinary tract infection)    • Varicose veins        PAST SURGICAL HISTORY  Past Surgical History:   Procedure Laterality Date   • ANKLE ARTHROSCOPY     • CARPAL TUNNEL RELEASE     • CHOLECYSTECTOMY     • COLONOSCOPY N/A 11/3/2017    Procedure: COLONOSCOPY; polypectomy;  Surgeon: Chevy Zuniga MD;   Location: [facility] OR;  Service:    • EXTERNAL FIXATOR APPLICATION     • GASTRIC BYPASS     • STOMACH SURGERY         FAMILY HISTORY  Family History   Problem Relation Age of Onset   • Aortic aneurysm Father      abdominal   • Heart disease Father    • Hypertension Brother    • Stroke Brother    • Heart disease Maternal Grandfather    • Stroke Paternal Grandmother    • Heart disease Paternal Grandfather        SOCIAL HISTORY  Social History     Social History   • Marital status: Single     Spouse name: N/A   • Number of children: N/A   • Years of education: N/A     Occupational History   • Not on file.     Social History Main Topics   • Smoking status: Never Smoker   • Smokeless tobacco: Never Used   • Alcohol use Yes      Comment: very seldom   • Drug use: No   • Sexual activity: Defer     Other Topics Concern   • Not on file     Social History Narrative       ALLERGIES  Morphine; Baclofen; Etodolac; Metronidazole; Nitrofurantoin; Other; and Tetracyclines & related    REVIEW OF SYSTEMS  Review of Systems   Constitutional: Negative for chills and fever.   HENT: Negative.  Negative for sore throat.    Eyes: Negative.    Respiratory: Positive for shortness of breath (on exertion). Negative for cough.    Cardiovascular: Negative.  Negative for chest pain.   Gastrointestinal: Negative.    Genitourinary: Negative.  Negative for dysuria.   Musculoskeletal: Negative.  Negative for back pain.   Skin: Negative.  Negative for rash.   Neurological: Positive for weakness (generalized). Negative for headaches.       PHYSICAL EXAM  ED Triage Vitals   Temp Heart Rate Resp BP SpO2   11/16/17 1257 11/16/17 1257 11/16/17 1257 -- 11/16/17 1257   97.3 °F (36.3 °C) 43 16  99 %      Temp src Heart Rate Source Patient Position BP Location FiO2 (%)   -- -- -- -- --              Physical Exam   Constitutional: She is oriented to person, place, and time and well-developed, well-nourished, and in no distress. No distress.   HENT:   Head:  Normocephalic and atraumatic.   Eyes: EOM are normal. Pupils are equal, round, and reactive to light.   Neck: Normal range of motion. Neck supple.   Cardiovascular: Regular rhythm and normal heart sounds.  Bradycardia present.    HR: 42 bpm   Pulmonary/Chest: Effort normal and breath sounds normal. No respiratory distress.   Abdominal: Soft. There is no tenderness. There is no rebound and no guarding.   Musculoskeletal: Normal range of motion. She exhibits edema (2+ BLE).   Neurological: She is alert and oriented to person, place, and time. She has normal sensation and normal strength.   Skin: Skin is warm and dry. No rash noted.   Psychiatric: Mood and affect normal.   Nursing note and vitals reviewed.      LAB RESULTS  Lab Results (last 24 hours)     Procedure Component Value Units Date/Time    CBC & Differential [[id number]] Collected:  11/16/17 1349    Specimen:  Blood Updated:  11/16/17 1402    Narrative:       The following orders were created for panel order CBC & Differential.  Procedure                               Abnormality         Status                     ---------                               -----------         ------                     CBC Auto Differential[[id number]]        Abnormal            Final result                 Please view results for these tests on the individual orders.    Comprehensive Metabolic Panel [[id number]]  (Abnormal) Collected:  11/16/17 1349    Specimen:  Blood Updated:  11/16/17 1426     Glucose 88 mg/dL      BUN 24 (H) mg/dL      Creatinine 1.18 (H) mg/dL      Sodium 140 mmol/L      Potassium 5.0 mmol/L      Chloride 103 mmol/L      CO2 27.2 mmol/L      Calcium 9.5 mg/dL      Total Protein 6.1 g/dL      Albumin 3.40 (L) g/dL      ALT (SGPT) 12 U/L      AST (SGOT) 14 U/L      Alkaline Phosphatase 61 U/L      Total Bilirubin 0.4 mg/dL      eGFR Non African Amer 45 (L) mL/min/1.73      Globulin 2.7 gm/dL      A/G Ratio 1.3 g/dL      BUN/Creatinine Ratio 20.3     Anion Gap  9.8 mmol/L     Troponin [[id number]]  (Normal) Collected:  11/16/17 1349    Specimen:  Blood Updated:  11/16/17 1426     Troponin T <0.010 ng/mL     Narrative:       Troponin T Reference Ranges:  Less than 0.03 ng/mL:    Negative for AMI  0.03 to 0.09 ng/mL:      Indeterminant for AMI  Greater than 0.09 ng/mL: Positive for AMI    CBC Auto Differential [[id number]]  (Abnormal) Collected:  11/16/17 1349    Specimen:  Blood Updated:  11/16/17 1402     WBC 7.74 10*3/mm3      RBC 4.19 10*6/mm3      Hemoglobin 12.3 g/dL      Hematocrit 39.6 %      MCV 94.5 fL      MCH 29.4 pg      MCHC 31.1 (L) g/dL      RDW 14.6 (H) %      RDW-SD 49.9 fl      MPV 10.3 fL      Platelets 274 10*3/mm3      Neutrophil % 50.5 %      Lymphocyte % 35.7 %      Monocyte % 8.4 %      Eosinophil % 4.7 %      Basophil % 0.4 %      Immature Grans % 0.3 %      Neutrophils, Absolute 3.92 10*3/mm3      Lymphocytes, Absolute 2.76 10*3/mm3      Monocytes, Absolute 0.65 10*3/mm3      Eosinophils, Absolute 0.36 10*3/mm3      Basophils, Absolute 0.03 10*3/mm3      Immature Grans, Absolute 0.02 10*3/mm3         I ordered the above labs and reviewed the results    PROCEDURES  Procedures  EKG         EKG time: 12:17  Rhythm/Rate: sinus bradycardia, rate 39  P waves and NV: normal  QRS, axis: normal   ST and T waves: normal     Interpreted Contemporaneously by me, independently viewed  Unchanged compared to prior       PROGRESS AND CONSULTS  ED Course   Comment By Time   4:12 PM  Patient with bradycardia.  Received metoprolol for CT coronary angiogram and became weak and bradycardic.  Discussed with Dr. Martinez who will admit for observation. Fredrick Doherty MD 11/16 1614     1:13 PM  Ordered labs for further evaluation.     2:37 PM  Consult placed to Dr. Martinez (cardiologist).     3:15 PM  Pt rechecked and is resting comfortably. BP- 167/75 HR- (!) 41 Temp- 97.3 °F (36.3 °C) O2 sat- 99%. Pt/family verbalized understanding and agree with plan to consult Dr. Martinez. All  questions and concerns addressed at this time.     4:02 PM  Discussed Pt's case with Dr. Martinez (cardiologist) who agrees to admit to a telemetry bed for overnight observation and CTA coronary arteries tomorrow.     4:04 PM  Pt rechecked and is resting comfortably. BP- 167/75 HR- 42 Temp- 97.3 °F (36.3 °C) O2 sat- 99%. Discussed consult with Dr. Martinez. Pt/family verbalized understanding and agree with plan to admit for overnight observation. Pt expresses concern about insurance covering the cost of her observation vs. full admission. All questions and concerns addressed at this time.     4:08 PM  Discussed Pt's concerns with Marlene (CCP nurse) who will  the Pt on observation vs admission and costs.       MEDICAL DECISION MAKING  Results were reviewed/discussed with the patient and they were also made aware of online access. Pt also made aware that some labs, such as cultures, will not be resulted during ER visit and follow up with PMD is necessary.     MDM  Number of Diagnoses or Management Options     Amount and/or Complexity of Data Reviewed  Clinical lab tests: ordered and reviewed (Unremarkable )  Decide to obtain previous medical records or to obtain history from someone other than the patient: yes  Review and summarize past medical records: yes (EKG in April 2017 shows HR: 52)  Discuss the patient with other providers: yes (Dr. Martinez (cardiologist))    Patient Progress  Patient progress: stable         DIAGNOSIS  Final diagnoses:   Bradycardia, sinus   Generalized weakness       DISPOSITION  ADMISSION    Discussed treatment plan and reason for admission with pt/family and admitting physician.  Pt/family voiced understanding of the plan for admission for further testing/treatment as needed.         Latest Documented Vital Signs:  As of 4:15 PM  BP- 111/56 HR- (!) 38 Temp- 97.3 °F (36.3 °C) O2 sat- 96%    --  Documentation assistance provided by danilo العراقي for Dr. Doherty.  Information recorded  by the scribe was done at my direction and has been verified and validated by me.            Sabiha العراقي  11/16/17 1615       Fredrick Doherty MD  11/16/17 2031

## 2017-11-17 ENCOUNTER — PATIENT MESSAGE (OUTPATIENT)
Dept: CARDIOLOGY | Facility: CLINIC | Age: 67
End: 2017-11-17

## 2017-11-17 ENCOUNTER — APPOINTMENT (OUTPATIENT)
Dept: CT IMAGING | Facility: HOSPITAL | Age: 67
End: 2017-11-17

## 2017-11-17 VITALS
HEIGHT: 62 IN | TEMPERATURE: 97.8 F | OXYGEN SATURATION: 96 % | HEART RATE: 41 BPM | WEIGHT: 257.25 LBS | DIASTOLIC BLOOD PRESSURE: 51 MMHG | SYSTOLIC BLOOD PRESSURE: 106 MMHG | BODY MASS INDEX: 47.34 KG/M2 | RESPIRATION RATE: 14 BRPM

## 2017-11-17 PROBLEM — E03.9 HYPOTHYROIDISM: Status: ACTIVE | Noted: 2017-11-17

## 2017-11-17 PROBLEM — R94.39 ABNORMAL STRESS TEST: Status: ACTIVE | Noted: 2017-11-17

## 2017-11-17 LAB
ANION GAP SERPL CALCULATED.3IONS-SCNC: 8.7 MMOL/L
BUN BLD-MCNC: 27 MG/DL (ref 8–23)
BUN/CREAT SERPL: 21.3 (ref 7–25)
CALCIUM SPEC-SCNC: 9.7 MG/DL (ref 8.6–10.5)
CHLORIDE SERPL-SCNC: 109 MMOL/L (ref 98–107)
CO2 SERPL-SCNC: 26.3 MMOL/L (ref 22–29)
CREAT BLD-MCNC: 1.27 MG/DL (ref 0.57–1)
DEPRECATED RDW RBC AUTO: 51.5 FL (ref 37–54)
ERYTHROCYTE [DISTWIDTH] IN BLOOD BY AUTOMATED COUNT: 14.6 % (ref 11.7–13)
GFR SERPL CREATININE-BSD FRML MDRD: 42 ML/MIN/1.73
GLUCOSE BLD-MCNC: 88 MG/DL (ref 65–99)
HCT VFR BLD AUTO: 38.7 % (ref 35.6–45.5)
HGB BLD-MCNC: 11.9 G/DL (ref 11.9–15.5)
MCH RBC QN AUTO: 29.7 PG (ref 26.9–32)
MCHC RBC AUTO-ENTMCNC: 30.7 G/DL (ref 32.4–36.3)
MCV RBC AUTO: 96.5 FL (ref 80.5–98.2)
PLATELET # BLD AUTO: 266 10*3/MM3 (ref 140–500)
PMV BLD AUTO: 10.2 FL (ref 6–12)
POTASSIUM BLD-SCNC: 5 MMOL/L (ref 3.5–5.2)
RBC # BLD AUTO: 4.01 10*6/MM3 (ref 3.9–5.2)
SODIUM BLD-SCNC: 144 MMOL/L (ref 136–145)
TSH SERPL DL<=0.05 MIU/L-ACNC: 11.29 MIU/ML (ref 0.27–4.2)
WBC NRBC COR # BLD: 7.87 10*3/MM3 (ref 4.5–10.7)

## 2017-11-17 PROCEDURE — 96361 HYDRATE IV INFUSION ADD-ON: CPT

## 2017-11-17 PROCEDURE — 0 IOPAMIDOL 61 % SOLUTION: Performed by: INTERNAL MEDICINE

## 2017-11-17 PROCEDURE — G0378 HOSPITAL OBSERVATION PER HR: HCPCS

## 2017-11-17 PROCEDURE — 75574 CT ANGIO HRT W/3D IMAGE: CPT

## 2017-11-17 PROCEDURE — 96360 HYDRATION IV INFUSION INIT: CPT

## 2017-11-17 PROCEDURE — 93005 ELECTROCARDIOGRAM TRACING: CPT | Performed by: INTERNAL MEDICINE

## 2017-11-17 PROCEDURE — 36415 COLL VENOUS BLD VENIPUNCTURE: CPT | Performed by: INTERNAL MEDICINE

## 2017-11-17 PROCEDURE — 85027 COMPLETE CBC AUTOMATED: CPT | Performed by: INTERNAL MEDICINE

## 2017-11-17 PROCEDURE — 80048 BASIC METABOLIC PNL TOTAL CA: CPT | Performed by: INTERNAL MEDICINE

## 2017-11-17 PROCEDURE — 99236 HOSP IP/OBS SAME DATE HI 85: CPT | Performed by: INTERNAL MEDICINE

## 2017-11-17 PROCEDURE — 75574 CT ANGIO HRT W/3D IMAGE: CPT | Performed by: INTERNAL MEDICINE

## 2017-11-17 PROCEDURE — 93010 ELECTROCARDIOGRAM REPORT: CPT | Performed by: INTERNAL MEDICINE

## 2017-11-17 PROCEDURE — 84443 ASSAY THYROID STIM HORMONE: CPT | Performed by: INTERNAL MEDICINE

## 2017-11-17 RX ORDER — SODIUM CHLORIDE 9 MG/ML
100 INJECTION, SOLUTION INTRAVENOUS CONTINUOUS
Status: DISCONTINUED | OUTPATIENT
Start: 2017-11-17 | End: 2017-11-17 | Stop reason: HOSPADM

## 2017-11-17 RX ORDER — LEVOTHYROXINE SODIUM 137 UG/1
137 TABLET ORAL DAILY
Qty: 30 TABLET | Refills: 0 | Status: SHIPPED | OUTPATIENT
Start: 2017-11-17 | End: 2018-10-23 | Stop reason: ALTCHOICE

## 2017-11-17 RX ADMIN — SODIUM CHLORIDE 100 ML/HR: 9 INJECTION, SOLUTION INTRAVENOUS at 09:28

## 2017-11-17 RX ADMIN — IOPAMIDOL 75 ML: 612 INJECTION, SOLUTION INTRAVENOUS at 11:26

## 2017-11-17 RX ADMIN — LEVOTHYROXINE SODIUM 112 MCG: 112 TABLET ORAL at 08:43

## 2017-11-17 RX ADMIN — TRAMADOL HYDROCHLORIDE 50 MG: 50 TABLET, FILM COATED ORAL at 02:45

## 2017-11-17 NOTE — H&P
Date of Hospital Visit: 17  Encounter Provider: Mir Martinez MD  Place of Service: [facility] CARDIOLOGY  Patient Name: Norma Ro  :1950  Referral Provider: Mir Martinez MD    Chief complaint:  Bradycardia    History of Present Illness    Ms Ro is a 67 year old woman who follows with me for chronic (mild) sinus bradycardia and hypertension.  She also has hypothyroidism, CKD (followed by Dr Levine), varicose veins, lipedema, chronic dyspnea on exertion, and GERD.  She was last seen in the office on 10/17/2017 for a follow up.  At that time, she stated she had an episode of chest pain that radiated around the right side to her back that went away on its own.  A Cardiolite and ECHO were ordered.  Her EKG showed sinus bradycardia which was not new for her. The SPECT was technically difficult due to body habitus and suggested an anterior defect, but there was a lot of attenuation.  For that reason, I arranged for a coronary CTA.    She presented for the CTA yesterday.  She had taken the prescribed doses of metoprolol tartrate (per the radiology protocol, of which I was unaware) and her pulse was in the 30s upon arrival.  She was extremely weak and she was taken to the ED.  Her blood pressure was fine, and her rhythm was sinus so I did not put her on any chronotropes.      Overnight her rate has improved to her baseline (high 40s-low 50s) and she feels fine.  Her chronic edema and dyspnea are unchanged.  She does not have chest pain, orthopnea, PND, or lightheadedness.      Previous Cardiac Testing    Stress Test 10/27/2017  · Left ventricular ejection fraction is normal (Calculated EF = 54%). Normal LV cavity size. Normal LV wall motion noted.  · Myocardial perfusion imaging indicates a small-sized, mild-to-moderately severe area of ischemia located in the anterior wall. Impressions are consistent with a low risk study.    ECHO 10/27/2017  · Left ventricular  systolic function is normal. Calculated EF = 65%. Estimated EF was in agreement with the calculated EF. Global strain = -30%. Normal left ventricular cavity size and wall thickness noted. All left ventricular wall segments contract normally.  · Left ventricular diastolic function is normal.  · Moderate mitral annular calcification is present. Calcified mitral valve chordae are present.  · No valvular stenosis or insufficiency.    Past Medical History:   Diagnosis Date   • Anemia    • Arthritis    • Chronic kidney disease    • Depression    • GERD (gastroesophageal reflux disease)    • History of cardiovascular stress test     normal Cardiolite 9/2016   • Hypertension    • Hypoglycemia     Rebound hypoglycemia   • Hypothyroidism    • Incisional hernia     s/p surgical repair, 2014   • Morbid obesity     s/p remote gastric bypass   • Sinus bradycardia    • UTI (urinary tract infection)    • Varicose veins        Past Surgical History:   Procedure Laterality Date   • ANKLE ARTHROSCOPY     • CARPAL TUNNEL RELEASE     • CHOLECYSTECTOMY     • COLONOSCOPY N/A 11/3/2017    Procedure: COLONOSCOPY; polypectomy;  Surgeon: Chevy Zuniga MD;  Location: [facility];  Service:    • EXTERNAL FIXATOR APPLICATION     • GASTRIC BYPASS     • STOMACH SURGERY         Prior to Admission medications    Medication Sig Start Date End Date Taking? Authorizing Provider   B Complex Vitamins (VITAMIN B COMPLEX) capsule capsule Take 1 capsule by mouth Daily.   Yes Historical Provider, MD   Cholecalciferol (VITAMIN D PO) Take 50,000 Int'l Units by mouth see administration instructions. Take 1x a month 11/4/14  Yes Historical Provider, MD   escitalopram (LEXAPRO) 20 MG tablet Daily. 9/29/17  Yes Historical Provider, MD   famotidine (PEPCID) 10 MG tablet Take 10 mg by mouth 2 (Two) Times a Day As Needed for heartburn.   Yes Historical Provider, MD   ferrous sulfate (IRON SUPPLEMENT) 325 (65 FE) MG tablet  1/10/17  Yes Historical Provider,  MD   levothyroxine (SYNTHROID, LEVOTHROID) 112 MCG tablet Daily. 1/23/17  Yes Historical Provider, MD   lisinopril (PRINIVIL,ZESTRIL) 2.5 MG tablet Take 2.5 mg by mouth Daily.   Yes Historical Provider, MD   Multiple Vitamins-Minerals (HAIR/SKIN/NAILS/BIOTIN) tablet  5/15/17  Yes Historical Provider, MD   oxybutynin (DITROPAN) 5 MG tablet Take 5 mg by mouth every night. 10/31/14  Yes Historical Provider, MD   Red Yeast Rice Extract 600 MG capsule Take 600 mg by mouth daily.   Yes Historical Provider, MD   traMADol (ULTRAM) 50 MG tablet Take 50 mg by mouth every 8 (eight) hours as needed. 10/31/14  Yes Historical Provider, MD   cyclobenzaprine (FLEXERIL) 10 MG tablet Take 10 mg by mouth every 8 (eight) hours. As needed 10/31/14   Historical Provider, MD   diphenhydrAMINE (BENADRYL) 25 mg capsule Take 25 mg by mouth every 6 (six) hours as needed for itching.    Historical Provider, MD   metoprolol tartrate (LOPRESSOR) 50 MG tablet Take 50 mg by mouth 2 (Two) Times a Day. Patient took Metoprolol 50 mg x2 last night and 1 pill this morning as directed by Dr. Martinez's office along with her Lisinipril 2.5 mg    Historical Provider, MD       Social History     Social History   • Marital status: Single     Spouse name: N/A   • Number of children: N/A   • Years of education: N/A     Occupational History   • Not on file.     Social History Main Topics   • Smoking status: Never Smoker   • Smokeless tobacco: Never Used   • Alcohol use Yes      Comment: very seldom   • Drug use: No   • Sexual activity: Defer     Other Topics Concern   • Not on file     Social History Narrative       Family History   Problem Relation Age of Onset   • Aortic aneurysm Father      abdominal   • Heart disease Father    • Hypertension Brother    • Stroke Brother    • Heart disease Maternal Grandfather    • Stroke Paternal Grandmother    • Heart disease Paternal Grandfather        Review of Systems   Constitutional: Positive for fatigue.  "  Cardiovascular: Positive for leg swelling.       Objective:     Vitals:    11/16/17 1951 11/16/17 2305 11/17/17 0459 11/17/17 0757   BP: 118/67 127/59  116/49   BP Location: Left arm Left arm  Right arm   Patient Position: Lying Lying  Lying   Pulse: (!) 37 (!) 44  (!) 44   Resp:    14   Temp: 98 °F (36.7 °C)   98 °F (36.7 °C)   TempSrc: Oral   Oral   SpO2: 97% 98%  95%   Weight:   257 lb 4 oz (117 kg)    Height:         Body mass index is 47.05 kg/(m^2).  Flowsheet Rows         First Filed Value    Admission Height  63\" (160 cm) Documented at 11/16/2017 1257    Admission Weight  260 lb (118 kg) Documented at 11/16/2017 1257          Physical Exam   Constitutional: She is oriented to person, place, and time.   obese   HENT:   Head: Normocephalic.   Nose: Nose normal.   Mouth/Throat: Oropharynx is clear and moist.   Eyes: Conjunctivae and EOM are normal. Pupils are equal, round, and reactive to light.   Neck: Normal range of motion.   Cannot assess JVD due to body habitus   Cardiovascular: Regular rhythm, normal heart sounds and intact distal pulses.  Bradycardia present.    Pulmonary/Chest: Effort normal and breath sounds normal.   Abdominal: Soft.   Cannot feel organs or aorta   Musculoskeletal: Normal range of motion. She exhibits edema (nonpitting edema).   Neurological: She is alert and oriented to person, place, and time. No cranial nerve deficit.   Skin: Skin is warm and dry. No erythema.   Psychiatric: She has a normal mood and affect. Her behavior is normal. Judgment and thought content normal.   Vitals reviewed.              Lab Review:                  Results from last 7 days  Lab Units 11/17/17  0439   SODIUM mmol/L 144   POTASSIUM mmol/L 5.0   CHLORIDE mmol/L 109*   CO2 mmol/L 26.3   BUN mg/dL 27*   CREATININE mg/dL 1.27*   GLUCOSE mg/dL 88   CALCIUM mg/dL 9.7       Results from last 7 days  Lab Units 11/16/17  1349   TROPONIN T ng/mL <0.010       Results from last 7 days  Lab Units 11/17/17  0439 "   WBC 10*3/mm3 7.87   HEMOGLOBIN g/dL 11.9   HEMATOCRIT % 38.7   PLATELETS 10*3/mm3 266        EKG 11/017/2017             EKG 11/16/2017                            I personally viewed and interpreted the patient's EKG/Telemetry data    Assessment/Plan:     1.  Sinus bradycardia -- she has baseline bradycardia but it was much worse due to metoprolol administration.  This is wearing off and she's at her baseline.    2.  Abnormal stress test -- she will have her CTA today.    3.  CKD -- she'll be given IV fluids to protect her kidneys from the dye load.    4.  Hypothyroidism -- TSH is very high so I'll increase her levothyroxine.    5.  HTN -- no issues.

## 2017-11-17 NOTE — PROGRESS NOTES
Discharge Planning Assessment  [facility]     Patient Name: Norma Ro  MRN: [id number]  Today's Date: 11/17/2017    Admit Date: 11/16/2017          Discharge Needs Assessment       11/17/17 1458    Living Environment    Lives With spouse    Living Arrangements house    Home Accessibility no concerns    Type of Financial/Environmental Concern none    Transportation Available car;family or friend will provide    Living Environment    Provides Primary Care For no one    Quality Of Family Relationships supportive    Able to Return to Prior Living Arrangements yes    Discharge Needs Assessment    Concerns To Be Addressed no discharge needs identified;denies needs/concerns at this time    Equipment Currently Used at Home bath bench    Discharge Disposition home or self-care    Discharge Planning Comments Home            Discharge Plan       11/17/17 1459    Case Management/Social Work Plan    Plan Home    Patient/Family In Agreement With Plan yes    Additional Comments IMM letter checked. CCP met with pt to discuss d/c planning. Facesheet verified. CCP role explained. Pt resides with her  in a single level home with exterior wheelchair ramp. Pt reports independence for mobility and use of bath bench at home. Pt denies past home health or sub-acute rehab. Pt's pharmacy is [facility] in [place], [place]. Pt denies known d/c needs at this time. CCP to follow to assist should d/c needs arise. Becky Leong LCSW        Discharge Placement     No information found                Demographic Summary       11/17/17 1458    Referral Information    Admission Type observation    Referral Source nursing;physician    Reason For Consult discharge planning    Record Reviewed medical record    Primary Care Physician Information    Name LEWIS Dee            Functional Status       11/17/17 1458    Functional Status Current    Ambulation 0-->independent    Transferring 0-->independent    Toileting 0-->independent     Bathing 0-->independent    Dressing 0-->independent    Eating 0-->independent    Communication 0-->understands/communicates without difficulty    Swallowing (if score 2 or more for any item, consult Rehab Services) 0-->swallows foods/liquids without difficulty    Functional Status Prior    Ambulation 0-->independent    Transferring 0-->independent    Toileting 0-->independent    Bathing 0-->independent    Dressing 0-->independent    Eating 0-->independent    Communication 0-->understands/communicates without difficulty    Swallowing 0-->swallows foods/liquids without difficulty    Cognitive/Perceptual/Developmental    Current Mental Status/Cognitive Functioning no deficits noted            Psychosocial     None            Abuse/Neglect     None            Legal     None            Substance Abuse     None            Patient Forms     None          Frannie Leong LCSW

## 2017-11-20 ENCOUNTER — TELEPHONE (OUTPATIENT)
Dept: INTERVENTIONAL RADIOLOGY/VASCULAR | Facility: HOSPITAL | Age: 67
End: 2017-11-20

## 2017-11-21 ENCOUNTER — TELEPHONE (OUTPATIENT)
Dept: CARDIOLOGY | Facility: CLINIC | Age: 67
End: 2017-11-21

## 2017-11-21 NOTE — TELEPHONE ENCOUNTER
Regarding: RE: Visit Follow-Up Question  ----- Message from Mirian Guo MA sent at 11/21/2017  1:50 PM EST -----       ----- Message sent from Mirian Guo MA to Nroma Ro at 11/21/2017  1:50 PM -----   Michelle Ms. Ro,    I have attempted to contact you regarding your my chart message, but had to leave a voice message.  Please contact the office at [phone],  if you still have questions or concerns.     ----- Message -----     From: Norma Ro     Sent: 11/17/2017  7:47 PM EST       To: Mir Martinez MD  Subject: Visit Follow-Up Question    Dr Martinez:    Thought I'd send you results from my latest blood work drawn at [facility]'s office on October 27.  Obviously there is some discrepancy between the lab references used by Colleen and 'leatha Cavanaugh.      TSH results in the last year.    Select   to view result comments  Date Result Reference Range Flag  Oct 27, 2017 2.18 mIU/L 0.465-4.88 mIU/L    Aug 15, 2017 0.63 mIU/L 0.465-4.88 mIU/L    Jan 19, 2017 1.53 mIU/L   0.465-4.88 mIU/L    Nov 02, 2016 25.20 mIU/L 0.465-4.88 mIU/L H      Free Thyroxin result     Date Result Reference Range Flag  Aug 15, 2017 1.27 ng/dL 0.78-2.19 ng/dL    Jan 19, 2017 1.22 ng/dL 0.78-2.19 ng/dL    Nov 02, 2016 0.96 ng/dL 0.78-2.19 ng/dL     There are other results from October 27 that you may want to peruse

## 2017-11-21 NOTE — TELEPHONE ENCOUNTER
I've tried contacting pt, but it looks like pt was prescribed Levothyroxine in the hospital.  If you need to review recent labs.

## 2017-11-28 NOTE — TELEPHONE ENCOUNTER
Given the differences we're seeing between the two labs, would recommend that she see her PCP to have it rechecked to compared to October.    KEVIN

## 2017-11-30 ENCOUNTER — TELEPHONE (OUTPATIENT)
Dept: CARDIOLOGY | Facility: CLINIC | Age: 67
End: 2017-11-30

## 2017-11-30 NOTE — TELEPHONE ENCOUNTER
----- Message from Javid Chacko sent at 11/21/2017 11:08 AM EST -----  Patient needs a hospital follow up at the [place] location in the morning due to working the afternoons on Tuesdays.    Javid

## 2017-11-30 NOTE — TELEPHONE ENCOUNTER
Attempted to call pt to offer appt, but had to leave a v/m.  If pt calls back, Dr. Martinez approved for pt to be added to the next available spot if pt calls back.

## 2017-11-30 NOTE — TELEPHONE ENCOUNTER
Set pt's appt for Tues 01/16/18/ at 0945 with Dr. Martinez. I mailed appt reminder to pt instructing her to call if she can not keep appt.    Appt reminder: mailed.

## 2018-02-13 ENCOUNTER — OFFICE VISIT (OUTPATIENT)
Dept: CARDIOLOGY | Facility: CLINIC | Age: 68
End: 2018-02-13

## 2018-02-13 VITALS
WEIGHT: 254 LBS | SYSTOLIC BLOOD PRESSURE: 122 MMHG | BODY MASS INDEX: 46.74 KG/M2 | HEIGHT: 62 IN | DIASTOLIC BLOOD PRESSURE: 88 MMHG | HEART RATE: 50 BPM

## 2018-02-13 DIAGNOSIS — I10 ESSENTIAL HYPERTENSION: ICD-10-CM

## 2018-02-13 DIAGNOSIS — R94.39 ABNORMAL STRESS TEST: ICD-10-CM

## 2018-02-13 DIAGNOSIS — R60.9 LIPOEDEMA: ICD-10-CM

## 2018-02-13 DIAGNOSIS — R93.1 AGATSTON CORONARY ARTERY CALCIUM SCORE LESS THAN 100: ICD-10-CM

## 2018-02-13 DIAGNOSIS — R00.1 SINUS BRADYCARDIA: Primary | ICD-10-CM

## 2018-02-13 PROCEDURE — 99213 OFFICE O/P EST LOW 20 MIN: CPT | Performed by: INTERNAL MEDICINE

## 2018-02-13 PROCEDURE — 93000 ELECTROCARDIOGRAM COMPLETE: CPT | Performed by: INTERNAL MEDICINE

## 2018-02-13 RX ORDER — TIZANIDINE HYDROCHLORIDE 2 MG/1
2 CAPSULE, GELATIN COATED ORAL 3 TIMES DAILY
COMMUNITY
End: 2018-10-23 | Stop reason: ALTCHOICE

## 2018-02-13 NOTE — PROGRESS NOTES
Date of Office Visit: 2018  Encounter Provider: Mir Martinez MD  Place of Service: [facility] CARDIOLOGY  Patient Name: Norma Ro  :1950    Chief Complaint   Patient presents with   • Slow Heart Rate   :     HPI: Norma Ro is a 67 y.o. female who presents today to follow-up.  She initially saw Dr. Adhikari in 2014 for preoperative risk assessment prior to hernia repair; he recommended no testing as she was aysmptomatic. Her pulse was 66 bpm at that time.     I met her in 2016 for the evaluation of sinus bradycardia.  She has hypothyroidism, and in 2016 she was hyperthyroid on her levothyroxine. The dose was decreased and her TSH jumped to 21 in mid 2016. Around that time, she also became concerned about her heart rate; a Holter monitor showed an average pulse of 58 bpm, with a range of 38-94. There were extremely rare ectopics, and no pauses.      She was then started on lisinopril for hypertension in 2016.  She has CKD and follows with Dr. Levine for this.     In 2017, she noted dyspnea and atypical chest pain.  A Cardiolite indicated anterior ischemia.  She wanted to avoid coronary angiography so coronary CTA was recommended.  Unfortunately, she was given oral metoprolol prior to the study per radiology protocol and became quite bradycardic and required overnight observation.  Her CTA showed no occlusive CAD; her CACS was 47.    She has chronic mild edema but has large varicose veins (and is morbidly obese). She was seen in the vein care center in 2016 and she declined a venous doppler (to check for reflux).  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  Compression hose were recommended but she couldn't wear them due to leg size; she has been using Copper compression sleeves.    She has chronic dyspnea with minimal activity but this is unchanged. She denies chest pain, palpitations, or syncope.    Past Medical History:    Diagnosis Date   • Anemia    • Arthritis    • Chronic kidney disease    • Depression    • GERD (gastroesophageal reflux disease)    • History of cardiovascular stress test     normal Cardiolite 9/2016   • Hypertension    • Hypoglycemia     Rebound hypoglycemia   • Hypothyroidism    • Incisional hernia     s/p surgical repair, 2014   • Morbid obesity     s/p remote gastric bypass   • Sinus bradycardia    • UTI (urinary tract infection)    • Varicose veins        Past Surgical History:   Procedure Laterality Date   • ANKLE ARTHROSCOPY     • CARPAL TUNNEL RELEASE     • CHOLECYSTECTOMY     • COLONOSCOPY N/A 11/3/2017    Procedure: COLONOSCOPY; polypectomy;  Surgeon: Chevy Zuniga MD;  Location: [facility];  Service:    • EXTERNAL FIXATOR APPLICATION     • GASTRIC BYPASS     • STOMACH SURGERY         Social History     Social History   • Marital status:      Spouse name: N/A   • Number of children: N/A   • Years of education: N/A     Occupational History   • Not on file.     Social History Main Topics   • Smoking status: Never Smoker   • Smokeless tobacco: Never Used   • Alcohol use Yes      Comment: very seldom   • Drug use: No   • Sexual activity: Defer     Other Topics Concern   • Not on file     Social History Narrative       Family History   Problem Relation Age of Onset   • Aortic aneurysm Father      abdominal   • Heart disease Father    • Hypertension Brother    • Stroke Brother    • Heart disease Maternal Grandfather    • Stroke Paternal Grandmother    • Heart disease Paternal Grandfather        Review of Systems   Constitution: Positive for malaise/fatigue.   Cardiovascular: Positive for leg swelling. Negative for chest pain.   Respiratory: Negative for shortness of breath.    Musculoskeletal: Negative for back pain.   Neurological: Positive for light-headedness.   All other systems reviewed and are negative.      Allergies   Allergen Reactions   • Morphine Itching   • Baclofen Dizziness  "and Arrhythmia   • Etodolac Hives   • Metronidazole Other (See Comments)     headaches   • Nitrofurantoin Other (See Comments)     headaches   • Other      LOTRIN     • Tetracyclines & Related Hives         Current Outpatient Prescriptions:   •  B Complex Vitamins (VITAMIN B COMPLEX) capsule capsule, Take 1 capsule by mouth Daily., Disp: , Rfl:   •  Cholecalciferol (VITAMIN D PO), Take 50,000 Int'l Units by mouth see administration instructions. Take 1x a month, Disp: , Rfl:   •  diphenhydrAMINE (BENADRYL) 25 mg capsule, Take 25 mg by mouth every 6 (six) hours as needed for itching., Disp: , Rfl:   •  escitalopram (LEXAPRO) 20 MG tablet, Daily., Disp: , Rfl:   •  famotidine (PEPCID) 10 MG tablet, Take 10 mg by mouth 2 (Two) Times a Day As Needed for heartburn., Disp: , Rfl:   •  ferrous sulfate (IRON SUPPLEMENT) 325 (65 FE) MG tablet, , Disp: , Rfl:   •  levothyroxine (SYNTHROID, LEVOTHROID) 137 MCG tablet, Take 1 tablet by mouth Daily., Disp: 30 tablet, Rfl: 0  •  lisinopril (PRINIVIL,ZESTRIL) 2.5 MG tablet, Take 2.5 mg by mouth Daily., Disp: , Rfl:   •  Multiple Vitamins-Minerals (HAIR/SKIN/NAILS/BIOTIN) tablet, , Disp: , Rfl:   •  oxybutynin (DITROPAN) 5 MG tablet, Take 5 mg by mouth every night., Disp: , Rfl:   •  Red Yeast Rice Extract 600 MG capsule, Take 600 mg by mouth daily., Disp: , Rfl:   •  TiZANidine (ZANAFLEX) 2 MG capsule, Take 2 mg by mouth 3 (Three) Times a Day., Disp: , Rfl:   •  traMADol (ULTRAM) 50 MG tablet, Take 50 mg by mouth every 8 (eight) hours as needed., Disp: , Rfl:      Objective:     Vitals:    02/13/18 0956   BP: 122/88   Pulse: 50   Weight: 115 kg (254 lb)   Height: 157.5 cm (62\")     Body mass index is 46.46 kg/(m^2).    Physical Exam   Constitutional: She is oriented to person, place, and time.   Obese   HENT:   Head: Normocephalic.   Nose: Nose normal.   Mouth/Throat: Oropharynx is clear and moist.   Eyes: Conjunctivae and EOM are normal. Pupils are equal, round, and reactive to " light.   Neck: Normal range of motion.   Cannot assess for JVD due to habitus   Cardiovascular: Regular rhythm, normal heart sounds and intact distal pulses.  Bradycardia present.    No murmur heard.  Pulmonary/Chest: Effort normal.   Abdominal: Soft.   Obesity limits abdominal exam   Musculoskeletal: Normal range of motion. She exhibits edema (lipedema, no pitting).   Neurological: She is alert and oriented to person, place, and time. No cranial nerve deficit.   Skin: Skin is warm and dry. No rash noted.   Psychiatric: She has a normal mood and affect. Her behavior is normal. Judgment and thought content normal.   Vitals reviewed.        ECG 12 Lead  Date/Time: 2/13/2018 10:34 AM  Performed by: MIR MARTINEZ  Authorized by: MIR MARTINEZ   Comparison: compared with previous ECG   Similar to previous ECG  Rhythm: sinus bradycardia  Conduction: conduction normal  ST Segments: ST segments normal  T Waves: T waves normal  QRS axis: normal  Other: no other findings  Clinical impression: normal ECG              Assessment:       Diagnosis Plan   1. Sinus bradycardia     2. Essential hypertension     3. Abnormal stress test     4. Agatston coronary artery calcium score less than 100     5. Lipoedema            Plan:       1. She has relatively mild sinus bradycardia without symptoms.  She still has good heart rate variability.  She does not require a pacemaker at this point.  She may in the future, as she likely has some degree of sick sinus syndrome, but she doesn't at this time.    2.  Her BP is well controlled.    3/4.  She had a false positive SPECT (for atypical chest pain).  If she needs a stress in the future it should be a PET stress.  A coronary CTA showed no significant CAD.  She had a minimally elevated calcium score of 47.  She declines statin therapy.    5.  Compression is recommended.  She has no evidence of CHF.    I will see her as needed.    Sincerely,       Mir Martinez MD

## 2018-08-12 ENCOUNTER — LAB (OUTPATIENT)
Dept: LAB | Facility: HOSPITAL | Age: 68
End: 2018-08-12
Attending: INTERNAL MEDICINE

## 2018-08-12 ENCOUNTER — TRANSCRIBE ORDERS (OUTPATIENT)
Dept: ADMINISTRATIVE | Facility: HOSPITAL | Age: 68
End: 2018-08-12

## 2018-08-12 DIAGNOSIS — N25.81 SECONDARY HYPERPARATHYROIDISM, RENAL (HCC): ICD-10-CM

## 2018-08-12 DIAGNOSIS — E83.52 HYPERCALCEMIA: ICD-10-CM

## 2018-08-12 DIAGNOSIS — N18.2 CHRONIC KIDNEY DISEASE, STAGE II (MILD): Primary | ICD-10-CM

## 2018-08-12 DIAGNOSIS — I10 ESSENTIAL HYPERTENSION: ICD-10-CM

## 2018-08-12 DIAGNOSIS — N18.2 CHRONIC KIDNEY DISEASE, STAGE II (MILD): ICD-10-CM

## 2018-08-12 LAB
ANION GAP SERPL CALCULATED.3IONS-SCNC: 6.7 MMOL/L
BUN BLD-MCNC: 19 MG/DL (ref 8–23)
BUN/CREAT SERPL: 19 (ref 7–25)
CALCIUM SPEC-SCNC: 10.2 MG/DL (ref 8.8–10.5)
CHLORIDE SERPL-SCNC: 104 MMOL/L (ref 98–107)
CO2 SERPL-SCNC: 31.3 MMOL/L (ref 22–29)
CREAT BLD-MCNC: 1 MG/DL (ref 0.57–1)
GFR SERPL CREATININE-BSD FRML MDRD: 55 ML/MIN/1.73
GLUCOSE BLD-MCNC: 94 MG/DL (ref 65–99)
PHOSPHATE SERPL-MCNC: 3.2 MG/DL (ref 2.7–4.5)
POTASSIUM BLD-SCNC: 4.5 MMOL/L (ref 3.5–5.2)
SODIUM BLD-SCNC: 142 MMOL/L (ref 136–145)

## 2018-08-12 PROCEDURE — 84100 ASSAY OF PHOSPHORUS: CPT

## 2018-08-12 PROCEDURE — 36415 COLL VENOUS BLD VENIPUNCTURE: CPT

## 2018-08-12 PROCEDURE — 82340 ASSAY OF CALCIUM IN URINE: CPT

## 2018-08-12 PROCEDURE — 81050 URINALYSIS VOLUME MEASURE: CPT

## 2018-08-12 PROCEDURE — 80048 BASIC METABOLIC PNL TOTAL CA: CPT

## 2018-08-12 PROCEDURE — 83970 ASSAY OF PARATHORMONE: CPT

## 2018-08-13 LAB
CALCIUM 24H UR-MCNC: 1.9 MG/DL
CALCIUM 24H UR-MRATE: 47.5 MG/24 HR (ref 100–300)
CALCIUM SPEC-SCNC: 10.1 MG/DL (ref 8.6–10.5)
COLLECT DURATION TIME UR: 24 HRS
PTH-INTACT SERPL-MCNC: 140.2 PG/ML (ref 15–65)
SPECIMEN VOL 24H UR: 2500 ML

## 2018-10-23 ENCOUNTER — OFFICE VISIT (OUTPATIENT)
Dept: CARDIOLOGY | Facility: CLINIC | Age: 68
End: 2018-10-23

## 2018-10-23 VITALS
SYSTOLIC BLOOD PRESSURE: 110 MMHG | HEART RATE: 55 BPM | DIASTOLIC BLOOD PRESSURE: 84 MMHG | BODY MASS INDEX: 46.14 KG/M2 | WEIGHT: 250.7 LBS | HEIGHT: 62 IN

## 2018-10-23 DIAGNOSIS — I10 ESSENTIAL HYPERTENSION: ICD-10-CM

## 2018-10-23 DIAGNOSIS — R42 LIGHTHEADEDNESS: ICD-10-CM

## 2018-10-23 DIAGNOSIS — R00.1 SINUS BRADYCARDIA: Primary | ICD-10-CM

## 2018-10-23 DIAGNOSIS — R94.39 ABNORMAL STRESS TEST: ICD-10-CM

## 2018-10-23 PROCEDURE — 93000 ELECTROCARDIOGRAM COMPLETE: CPT | Performed by: INTERNAL MEDICINE

## 2018-10-23 PROCEDURE — 99214 OFFICE O/P EST MOD 30 MIN: CPT | Performed by: INTERNAL MEDICINE

## 2018-10-23 RX ORDER — CYCLOBENZAPRINE HCL 5 MG
5 TABLET ORAL AS NEEDED
COMMUNITY
End: 2019-05-22

## 2018-10-23 RX ORDER — MULTIVIT,CALC,MINS/FOLIC ACID 200 MCG
1 TABLET ORAL NIGHTLY
COMMUNITY
End: 2021-09-20 | Stop reason: ALTCHOICE

## 2018-10-23 RX ORDER — CALCITRIOL 0.5 UG/1
0.75 CAPSULE, LIQUID FILLED ORAL DAILY
COMMUNITY
Start: 2018-09-16 | End: 2019-11-04

## 2018-10-23 RX ORDER — LEVOTHYROXINE SODIUM 112 UG/1
TABLET ORAL DAILY
COMMUNITY
Start: 2018-09-21 | End: 2018-12-10 | Stop reason: DRUGHIGH

## 2018-10-23 RX ORDER — CALCITRIOL 0.25 UG/1
CAPSULE, LIQUID FILLED ORAL DAILY
COMMUNITY
Start: 2018-10-05 | End: 2019-05-22 | Stop reason: SDUPTHER

## 2018-10-23 NOTE — PROGRESS NOTES
Date of Office Visit: 10/28/2018  Encounter Provider: Mir Martinez MD  Place of Service: [facility] CARDIOLOGY  Patient Name: Norma Ro  :1950    Chief Complaint   Patient presents with   • Slow Heart Rate     follow up    :     HPI: Norma Ro is a 68 y.o. female who presents today to follow-up.  She initially saw Dr. Adhikari in 2014 for preoperative risk assessment prior to hernia repair; he recommended no testing as she was aysmptomatic. Her pulse was 66 bpm at that time.     I met her in 2016 for the evaluation of sinus bradycardia.  She has hypothyroidism, and in 2016 she was hyperthyroid on her levothyroxine. The dose was decreased and her TSH jumped to 21 in mid 2016. Around that time, she also became concerned about her heart rate; a Holter monitor showed an average pulse of 58 bpm, with a range of 38-94. There were extremely rare ectopics, and no pauses.      She was then started on lisinopril for hypertension in 2016.  She has CKD and follows with Dr. Levine for this.     In 2017, she noted dyspnea and atypical chest pain.  A Cardiolite indicated anterior ischemia.  She wanted to avoid coronary angiography so coronary CTA was recommended.  Unfortunately, she was given oral metoprolol prior to the study per radiology protocol and became quite bradycardic and required overnight observation.  Her CTA showed no occlusive CAD; her CACS was 47.    She has chronic mild edema but has large varicose veins (and is morbidly obese). She was seen in the vein care center in 2016 and she declined a venous doppler (to check for reflux).  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  Compression hose were recommended but she couldn't wear them due to leg size; she has been using Copper compression sleeves.    She is on medical leave due to her legs, and has been more sedentary than usual.  Her FitBit reports pulse rates in the 40-50s and  that concerns her.  This morning in the shower she had an episode of lightheadedness, but this is the first time this has happened.  She has chronic dyspnea with minimal activity but this is unchanged. She denies chest pain or palpitations.    Past Medical History:   Diagnosis Date   • Anemia    • Arthritis    • Chronic kidney disease    • Depression    • GERD (gastroesophageal reflux disease)    • History of cardiovascular stress test     normal Cardiolite 9/2016   • Hypertension    • Hypoglycemia     Rebound hypoglycemia   • Hypothyroidism    • Incisional hernia     s/p surgical repair, 2014   • Morbid obesity (CMS/HCC)     s/p remote gastric bypass   • Sinus bradycardia    • UTI (urinary tract infection)    • Varicose veins        Past Surgical History:   Procedure Laterality Date   • ANKLE ARTHROSCOPY     • CARPAL TUNNEL RELEASE     • CHOLECYSTECTOMY     • COLONOSCOPY N/A 11/3/2017    Procedure: COLONOSCOPY; polypectomy;  Surgeon: Chevy Zuniga MD;  Location: [facility];  Service:    • EXTERNAL FIXATOR APPLICATION     • GASTRIC BYPASS     • STOMACH SURGERY         Social History     Social History   • Marital status:      Spouse name: N/A   • Number of children: N/A   • Years of education: N/A     Occupational History   • RETIRED    • PART-TIME/ [employer]       Social History Main Topics   • Smoking status: Never Smoker   • Smokeless tobacco: Never Used      Comment: CAFFEINE USE: MOSTLY CAFFEINE FREE.    • Alcohol use Yes      Comment: very seldom   • Drug use: No   • Sexual activity: Defer     Other Topics Concern   • Not on file     Social History Narrative   • No narrative on file       Family History   Problem Relation Age of Onset   • Aortic aneurysm Father         abdominal   • Heart disease Father    • Hypertension Brother    • Stroke Brother    • Heart disease Maternal Grandfather    • Stroke Paternal Grandmother    • Heart disease Paternal Grandfather        Review of Systems    Constitution: Positive for malaise/fatigue.   Cardiovascular: Positive for leg swelling. Negative for chest pain.   Respiratory: Negative for shortness of breath.    Musculoskeletal: Positive for myalgias. Negative for back pain.   Neurological: Positive for light-headedness.   All other systems reviewed and are negative.      Allergies   Allergen Reactions   • Morphine Itching   • Baclofen Dizziness and Arrhythmia   • Etodolac Hives   • Metronidazole Other (See Comments)     headaches   • Nitrofurantoin Other (See Comments)     headaches   • Tetracyclines & Related Hives   • Other Rash     LODINE  BLOTCHES ON SKIN              Current Outpatient Prescriptions:   •  B Complex Vitamins (VITAMIN B COMPLEX) capsule capsule, Take 1 capsule by mouth Daily., Disp: , Rfl:   •  calcitriol (ROCALTROL) 0.25 MCG capsule, Daily., Disp: , Rfl:   •  calcitriol (ROCALTROL) 0.5 MCG capsule, , Disp: , Rfl:   •  cyclobenzaprine (FLEXERIL) 5 MG tablet, Take 5 mg by mouth As Needed for Muscle Spasms., Disp: , Rfl:   •  diphenhydrAMINE (BENADRYL) 25 mg capsule, Take 25 mg by mouth every 6 (six) hours as needed for itching., Disp: , Rfl:   •  escitalopram (LEXAPRO) 20 MG tablet, Daily., Disp: , Rfl:   •  famotidine (PEPCID) 10 MG tablet, Take 10 mg by mouth 2 (Two) Times a Day As Needed for heartburn., Disp: , Rfl:   •  levothyroxine (SYNTHROID, LEVOTHROID) 112 MCG tablet, Daily., Disp: , Rfl:   •  Multiple Vitamins-Minerals (ONE-A-DAY PROACTIVE 65+) tablet, , Disp: , Rfl:   •  oxybutynin (DITROPAN) 5 MG tablet, Take 5 mg by mouth every night., Disp: , Rfl:   •  Red Yeast Rice Extract 600 MG capsule, Take 600 mg by mouth daily., Disp: , Rfl:   •  traMADol (ULTRAM) 50 MG tablet, Take 50 mg by mouth every 8 (eight) hours as needed., Disp: , Rfl:   •  ferrous sulfate (IRON SUPPLEMENT) 325 (65 FE) MG tablet, , Disp: , Rfl:      Objective:     Vitals:    10/23/18 1304   BP: 110/84   BP Location: Right arm   Pulse: 55   Weight: 114 kg (250  "lb 11.2 oz)   Height: 157.5 cm (62\")     Body mass index is 45.85 kg/m².    Physical Exam   Constitutional: She is oriented to person, place, and time.   Obese   HENT:   Head: Normocephalic.   Nose: Nose normal.   Mouth/Throat: Oropharynx is clear and moist.   Eyes: Pupils are equal, round, and reactive to light. Conjunctivae and EOM are normal.   Neck: Normal range of motion.   Cannot assess for JVD due to habitus   Cardiovascular: Normal rate, regular rhythm, normal heart sounds and intact distal pulses.    No murmur heard.  Pulmonary/Chest: Effort normal.   Abdominal: Soft.   Obesity limits abdominal exam   Musculoskeletal: Normal range of motion. She exhibits edema (lipedema, no pitting).   Neurological: She is alert and oriented to person, place, and time. No cranial nerve deficit.   Skin: Skin is warm and dry. No rash noted.   Psychiatric: She has a normal mood and affect. Her behavior is normal. Judgment and thought content normal.   Vitals reviewed.        ECG 12 Lead  Date/Time: 10/23/2018 2:15 PM  Performed by: SERGIO BARCENAS  Authorized by: SERGIO BARCENAS   Comparison: compared with previous ECG   Similar to previous ECG  Rhythm: sinus rhythm  Conduction: conduction normal  ST Segments: ST segments normal  T Waves: T waves normal  QRS axis: normal  Other: no other findings  Clinical impression: normal ECG              Assessment:       Diagnosis Plan   1. Sinus bradycardia  Holter Monitor - 72 Hour Up To 21 Days   2. Lightheadedness  Holter Monitor - 72 Hour Up To 21 Days   3. Essential hypertension     4. Abnormal stress test            Plan:       1/2. She has relatively mild sinus bradycardia without symptoms.  She had good heart rate variability on a Holter. She has not met any criteria for a pacemaker.  Her usual rate in the 50s is now in the 40s occasionally because she is more sedentary than usual.  She had one episode of lightheadedness; if that is due to her rate, then a device would be indicated.  " I'll check a Zio.     3.  Her BP is well controlled.    3.  She had a false positive SPECT (for atypical chest pain).  If she needs a stress in the future it should be a PET stress.  A coronary CTA showed no significant CAD.  She had a minimally elevated calcium score of 47.  She declines statin therapy.    Sincerely,       Mir Martinez MD

## 2018-10-24 ENCOUNTER — HOSPITAL ENCOUNTER (OUTPATIENT)
Dept: CARDIOLOGY | Facility: HOSPITAL | Age: 68
Discharge: HOME | End: 2018-10-24
Attending: INTERNAL MEDICINE | Admitting: INTERNAL MEDICINE

## 2018-10-24 DIAGNOSIS — R42 LIGHTHEADEDNESS: ICD-10-CM

## 2018-10-24 DIAGNOSIS — R00.1 SINUS BRADYCARDIA: ICD-10-CM

## 2018-10-24 PROCEDURE — 0296T HC EXT ECG > 48HR TO 21 DAY RCRD W/CONECT INTL RCRD: CPT

## 2018-11-15 PROCEDURE — 0298T HOLTER MONITOR - 72 HOUR UP TO 21 DAY: CPT | Performed by: INTERNAL MEDICINE

## 2018-12-10 ENCOUNTER — OFFICE VISIT (OUTPATIENT)
Dept: NEUROSURGERY | Facility: CLINIC | Age: 68
End: 2018-12-10

## 2018-12-10 VITALS
BODY MASS INDEX: 44.65 KG/M2 | WEIGHT: 252 LBS | HEIGHT: 63 IN | SYSTOLIC BLOOD PRESSURE: 146 MMHG | DIASTOLIC BLOOD PRESSURE: 82 MMHG | RESPIRATION RATE: 18 BRPM | HEART RATE: 76 BPM

## 2018-12-10 DIAGNOSIS — G89.29 CHRONIC RIGHT-SIDED LOW BACK PAIN WITH RIGHT-SIDED SCIATICA: ICD-10-CM

## 2018-12-10 DIAGNOSIS — E66.01 MORBID OBESITY WITH BMI OF 45.0-49.9, ADULT (HCC): Primary | ICD-10-CM

## 2018-12-10 DIAGNOSIS — M54.41 CHRONIC RIGHT-SIDED LOW BACK PAIN WITH RIGHT-SIDED SCIATICA: ICD-10-CM

## 2018-12-10 PROCEDURE — 99204 OFFICE O/P NEW MOD 45 MIN: CPT | Performed by: NURSE PRACTITIONER

## 2018-12-10 RX ORDER — ACETAMINOPHEN 500 MG
500 TABLET ORAL EVERY 6 HOURS PRN
COMMUNITY
End: 2021-09-20 | Stop reason: ALTCHOICE

## 2018-12-10 RX ORDER — LEVOTHYROXINE SODIUM 0.12 MG/1
125 TABLET ORAL DAILY
COMMUNITY
Start: 2018-11-25 | End: 2019-11-04
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
## 2018-12-12 PROBLEM — M54.41 CHRONIC RIGHT-SIDED LOW BACK PAIN WITH RIGHT-SIDED SCIATICA: Status: ACTIVE | Noted: 2018-12-12

## 2018-12-12 PROBLEM — G89.29 CHRONIC RIGHT-SIDED LOW BACK PAIN WITH RIGHT-SIDED SCIATICA: Status: ACTIVE | Noted: 2018-12-12

## 2018-12-27 ENCOUNTER — TELEPHONE (OUTPATIENT)
Dept: CARDIOLOGY | Facility: CLINIC | Age: 68
End: 2018-12-27

## 2019-01-02 NOTE — TELEPHONE ENCOUNTER
Date of Office Visit:  2019  Encounter Provider:  Mir Martinez MD  Place of Service:  [facility]  Patient Name: Norma Ro  :  1950      To Whom it May Concern:    Ms Ro has sinus bradycardia which does not require a pacemaker.  She does not have CAD or CHF.      She is at low risk of major adverse cardiovascular events with surgery.     Please contact our office with any questions or concerns. As always, it has been a pleasure to participate in your patient's care.      Mir Martinez MD  [facility]

## 2019-05-21 PROBLEM — E21.3 HYPERPARATHYROIDISM: Status: ACTIVE | Noted: 2019-05-21

## 2019-05-21 PROBLEM — E83.52 HYPERCALCEMIA: Status: ACTIVE | Noted: 2019-05-21

## 2019-05-21 NOTE — PROGRESS NOTES
SURGERY  Norma KERRIE Ro   1950 05/22/19    Chief Complaint:  hyperparathyroidism    HPI    Patient is a nice 69 y.o. female referred by Dr. Marcelino Levine, with suspected hyperparathyroidism.  She has chronic kidney disease stage III, with Vitamin D deficiency, on calcitriol, with vitamin D level at the end of last year being in the 30 range, PTH over 200, with calcium of 10.1 previously.  Her latest values are calcium of 10.6,  and vit D 36.  She has not had any imaging yet.  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  Thus we have a discordant picture.    Her symptoms are fatigue (chronic worsening), with sleep disturbance and sometimes 15 hours of sleep.  She has muscle and bone aches, osteopenia, a history of bone fractures, without history of pancreatitis or kidney stones.  She is not having difficulty with concentration.    She has a family history of Factor V so she was checked and was negative.  No family history of parathyroid problems, but her mother was hypothyroid.    She also brings up that she is having some abdominal pain, and feels a mass on her left side and thinks she has a hernia, and produces a CT scan report on her phone that was done at [facility] in [place]    Past Medical History:   Diagnosis Date   • Anemia    • Arthritis    • Broken bones    • Chronic kidney disease    • Depression    • GERD (gastroesophageal reflux disease)    • History of blood clots    • History of cardiovascular stress test     normal Cardiolite 9/2016   • Hypertension    • Hypoglycemia     Rebound hypoglycemia   • Hypothyroidism    • Incisional hernia     s/p surgical repair, 2014   • Irregular heartbeat    • Morbid obesity (CMS/HCC)     s/p remote gastric bypass   • Sinus bradycardia    • UTI (urinary tract infection)    • Varicose veins      Past Surgical History:   Procedure Laterality Date   • ANKLE ARTHRODESIS Right 01/23/2019    Right open ankle  arthrodesis, harvest of proximal tibial bone graft as major graft, exostectomy, medial great toe-Dr. Santy Romero   • CARPAL TUNNEL RELEASE Left    • CHOLECYSTECTOMY     • COLONOSCOPY N/A 11/3/2017    Procedure: COLONOSCOPY; polypectomy;  Surgeon: Chevy Zuniga MD;  Location: [facility] OR;  Service:    • COLONOSCOPY N/A 03/25/2007    Normal-Dr. Timoteo Prado   • EXTERNAL FIXATOR APPLICATION     • GASTRIC BYPASS  1980s   • HERNIA REPAIR     • ORIF TIBIA/FIBULA FRACTURES Right 2005   • UPPER GASTROINTESTINAL ENDOSCOPY N/A 03/25/2007    No gross lesions in the esophagus, non-bleeding erythematous gastropathy, antrectomy with a small gastric pouch remnant, Billroth II anatomy with a gastrojejunostomy-Dr. Timoteo Prado     Family History   Problem Relation Age of Onset   • Aortic aneurysm Father         abdominal   • Heart disease Father    • Hypertension Brother    • Stroke Brother    • Intracerebral hemorrhage Brother    • Heart disease Maternal Grandfather    • Stroke Paternal Grandmother    • Heart disease Paternal Grandfather    • Diabetes Mother      Social History     Socioeconomic History   • Marital status:      Spouse name: Not on file   • Number of children: Not on file   • Years of education: Not on file   • Highest education level: Not on file   Occupational History   • Occupation: RETIRED   • Occupation: PART-TIME/ [employer]    Tobacco Use   • Smoking status: Never Smoker   • Smokeless tobacco: Never Used   • Tobacco comment: CAFFEINE USE: MOSTLY CAFFEINE FREE.    Substance and Sexual Activity   • Alcohol use: No     Frequency: Never     Comment: very seldom   • Drug use: No   • Sexual activity: Defer         Current Outpatient Medications:   •  acetaminophen (TYLENOL) 500 MG tablet, Take 500 mg by mouth Every 6 (Six) Hours As Needed for Mild Pain ., Disp: , Rfl:   •  B Complex Vitamins (VITAMIN B COMPLEX) capsule capsule, Take 1 capsule by mouth Daily., Disp: , Rfl:   •  calcitriol  (ROCALTROL) 0.5 MCG capsule, 0.75 mcg., Disp: , Rfl:   •  diphenhydrAMINE (BENADRYL) 25 mg capsule, Take 25 mg by mouth every 6 (six) hours as needed for itching., Disp: , Rfl:   •  ferrous sulfate (IRON SUPPLEMENT) 325 (65 FE) MG tablet, , Disp: , Rfl:   •  levothyroxine (SYNTHROID, LEVOTHROID) 125 MCG tablet, , Disp: , Rfl:   •  Multiple Vitamins-Minerals (ONE-A-DAY PROACTIVE 65+) tablet, , Disp: , Rfl:   •  oxybutynin (DITROPAN) 5 MG tablet, Take 5 mg by mouth every night., Disp: , Rfl:   •  Red Yeast Rice Extract 600 MG capsule, Take 600 mg by mouth daily., Disp: , Rfl:   •  traMADol (ULTRAM) 50 MG tablet, Take 50 mg by mouth every 8 (eight) hours as needed., Disp: , Rfl:   •  TURMERIC PO, Take  by mouth., Disp: , Rfl:   •  cyclobenzaprine (FLEXERIL) 5 MG tablet, Take 5 mg by mouth., Disp: , Rfl:   •  DULoxetine (CYMBALTA) 20 MG capsule, Take 20 mg by mouth 2 (Two) Times a Day., Disp: , Rfl:   •  famotidine (PEPCID) 10 MG tablet, Take 10 mg by mouth 2 (Two) Times a Day As Needed for heartburn., Disp: , Rfl:   •  nystatin (MYCOSTATIN) 970441 UNIT/GM powder, 100,000 application., Disp: , Rfl:     Allergies   Allergen Reactions   • Morphine Itching   • Baclofen Dizziness and Arrhythmia   • Etodolac Hives   • Metronidazole Other (See Comments)     headaches   • Nitrofurantoin Other (See Comments)     headaches   • Tetracyclines & Related Hives   • Other Rash     LODINE  BLOTCHES ON SKIN          Review of Systems   HENT: Positive for tinnitus.    Gastrointestinal: Positive for abdominal pain, diarrhea and nausea.   Musculoskeletal: Positive for arthralgias, back pain and bursitis.   Neurological: Positive for weakness, light-headedness and headache.   Psychiatric/Behavioral: Positive for sleep disturbance and depressed mood.   All other systems reviewed and are negative.      Vitals:    05/22/19 1322   Pulse: 82   SpO2: 96%   Weight: 114 kg (252 lb)       PHYSICAL EXAM:    Pulse 82   Wt 114 kg (252 lb)   SpO2 96%    BMI 45.36 kg/m²   Body mass index is 45.36 kg/m².    Constitutional: well developed, well nourished, appears moderately healthy, stated age, increased oral motion  Eyes: sclera nonicteric, conjunctiva not injected   ENMT: Hearing intact, trachea midline, thyroid without masses  CVS: RRR, no murmur, peripheral edema 1+, varicosities on left leg  Respiratory: CTA, normal respiratory effort   Gastrointestinal: no hepatosplenomegaly, abdomen obese, with pannus, abdominal hernia suspected in the left spit Erica region, with chevron incisional scar  Genitourinary: inguinal hernia not detected  Musculoskeletal: gait slowed and swinging, muscle mass normal incisional scar on lower anterior, ankle from recent surgery  Skin: warm and dry, no rashes visible  Neurological: awake and alert, seems to have reasonable capacity for understanding for medical decision making  Psychiatric: appears to have reasonable judgement, pleasant  Lymphatics: no cervical adenopathy    Radiographic Findings: CT scan from [place] daughter with apparent suggestion is to galea and hernia as well as recurrent ventral hernia    Lab Findings: As above    Pamphlet reviewed: Parathyroid    IMPRESSION:  · Hypercalcemia, vacillating, with elevated PTH.  This is accompanied by urine studies that suggest familial hypocalciuric hypercalcemia and thus we have discordant findings.  The PTH continues to go up suggesting hyperparathyroidism.  · Vitamin D deficiency, on calcitriol  · Chronic kidney disease stage II/III  · Depression, chronic on duloxetine, not felt attributable to hyperparathyroidism  · Hypothyroidism  · Body mass index is 45.36 kg/m².  · Recurrent ventral hernia and suspected to the galea and hernia, minimally symptomatic  · Chronic pain on tramadol  · Anemia on iron supplement, status post gastric bypass    PLAN:  · SPECT scan at [facility].  I routinely we get a SPECT-CT scan, but in the context of her kidney disease, I do not want to worsen  that, and thus will stick with just the nuclear scan.  · Ultrasound of the neck at [facility].  · Follow-up after the studies.  If in fact they are not revealing, I will contact Dr. Levine to see if he thinks IV contrast would be suitable for a SPECT[facility]CT at [facility], with contrast  · Will address hernias after we get her calcium levels corrected  Went over pamphlet for parathyroid surgery.  The risk of parathyroid surgery were discussed with the patient including bleeding, infection, recurrent laryngeal nerve injury, hypocalcemia potentially necessitating temporary or permanent supplementation with calcium and/or activated vitamin D, with repeated labs.  We also discussed the accuracy rate of pre op studies not being ideal and the potential that the affected gland may not be located and hypercalcemia may remain.  Of course, scarring will be present.  · We discussed the intended outpatient nature of this procedure but if the gland cannot be found in the expected location, and/or bilateral exploration is need, there is the possibility of the need for an overnight stay.  In this case, supplementation of a more complex nature will be more likely and for a more extended period.  · We will consider repeat urine studies when the patient returns, her prior ones having been done in 2018.    Gianna Knowles MD  05/22/19  3:51 PM

## 2019-05-22 ENCOUNTER — OFFICE VISIT (OUTPATIENT)
Dept: SURGERY | Facility: CLINIC | Age: 69
End: 2019-05-22

## 2019-05-22 VITALS — OXYGEN SATURATION: 96 % | WEIGHT: 252 LBS | BODY MASS INDEX: 45.36 KG/M2 | HEART RATE: 82 BPM

## 2019-05-22 DIAGNOSIS — E21.3 HYPERPARATHYROIDISM (HCC): Primary | ICD-10-CM

## 2019-05-22 DIAGNOSIS — E83.52 HYPERCALCEMIA: ICD-10-CM

## 2019-05-22 PROCEDURE — 99204 OFFICE O/P NEW MOD 45 MIN: CPT | Performed by: SURGERY

## 2019-05-22 RX ORDER — NYSTATIN 100000 [USP'U]/G
100000 POWDER TOPICAL AS NEEDED
COMMUNITY
Start: 2019-05-16 | End: 2020-12-29

## 2019-05-22 RX ORDER — CYCLOBENZAPRINE HCL 5 MG
5 TABLET ORAL AS NEEDED
COMMUNITY

## 2019-05-22 RX ORDER — DULOXETIN HYDROCHLORIDE 20 MG/1
30 CAPSULE, DELAYED RELEASE ORAL 2 TIMES DAILY
Status: ON HOLD | COMMUNITY
Start: 2019-04-05 | End: 2022-03-09

## 2019-05-30 ENCOUNTER — HOSPITAL ENCOUNTER (OUTPATIENT)
Dept: NUCLEAR MEDICINE | Facility: HOSPITAL | Age: 69
Discharge: HOME | End: 2019-05-30

## 2019-05-30 ENCOUNTER — HOSPITAL ENCOUNTER (OUTPATIENT)
Dept: ULTRASOUND IMAGING | Facility: HOSPITAL | Age: 69
Discharge: HOME | End: 2019-05-30
Admitting: SURGERY

## 2019-05-30 DIAGNOSIS — E21.3 HYPERPARATHYROIDISM (HCC): ICD-10-CM

## 2019-05-30 PROCEDURE — 76536 US EXAM OF HEAD AND NECK: CPT

## 2019-05-30 PROCEDURE — 78071 PARATHYRD PLANAR W/WO SUBTRJ: CPT

## 2019-05-30 PROCEDURE — A9500 TC99M SESTAMIBI: HCPCS | Performed by: SURGERY

## 2019-05-30 PROCEDURE — 0 TECHNETIUM SESTAMIBI: Performed by: SURGERY

## 2019-05-30 RX ADMIN — TECHNETIUM TC 99M SESTAMIBI 1 DOSE: 1 INJECTION INTRAVENOUS at 10:17

## 2019-06-03 NOTE — PROGRESS NOTES
SURGERY  Norma KERRIE Ro   1950 06/17/19    Chief Complaint:  hyperparathyroidism    HPI    Patient is a nice 69 y.o. female who returns after initial visit in late May with hypercalcemia.  She was referred by Dr. Marcelino Levine.   Unfortunately, she had a discordant picture with a PTH greater than 200 but a low urine calcium.  I talked about repeating her urine studies and ordered a SPECT and US at [facility].    The SPECT is read as a midline adenoma, in the mediastinum.    this was read as superior mediastinum in the upper thorax, below the thyroid.  Her US was unrevealing.    She has chronic kidney disease stage III, with Vitamin D deficiency, on calcitriol, with vitamin D level at the end of last year being in the 30 range, PTH over 200, with calcium of 10.1 previously.  Her latest values are calcium of 10.6,  and vit D 36.  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  Thus we have a discordant picture.    Her symptoms are fatigue (chronic worsening), with sleep disturbance and sometimes 15 hours of sleep.  She has muscle and bone aches, osteopenia, a history of bone fractures, without history of pancreatitis or kidney stones.  She is not having difficulty with concentration.    She has a family history of Factor V so she was checked and was negative.  No family history of parathyroid problems, but her mother was hypothyroid.    She also brings up that she is having some abdominal pain, and feels a mass on her left side and thinks she has a hernia, and produces a CT scan report on her phone that was done at [facility] in [place]    Past Medical History:   Diagnosis Date   • Anemia    • Arthritis    • Broken bones    • Chronic kidney disease    • Depression    • GERD (gastroesophageal reflux disease)    • History of blood clots    • History of cardiovascular stress test     normal Cardiolite 9/2016   • Hypertension    • Hypoglycemia     Rebound hypoglycemia    • Hypothyroidism    • Incisional hernia     s/p surgical repair, 2014   • Irregular heartbeat    • Morbid obesity (CMS/HCC)     s/p remote gastric bypass   • Sinus bradycardia    • UTI (urinary tract infection)    • Varicose veins      Past Surgical History:   Procedure Laterality Date   • ANKLE ARTHRODESIS Right 01/23/2019    Right open ankle arthrodesis, harvest of proximal tibial bone graft as major graft, exostectomy, medial great toe-Dr. Santy Romero   • CARPAL TUNNEL RELEASE Left    • CHOLECYSTECTOMY     • COLONOSCOPY N/A 11/3/2017    Procedure: COLONOSCOPY; polypectomy;  Surgeon: Chevy Zuniga MD;  Location: [facility] OR;  Service:    • COLONOSCOPY N/A 03/25/2007    Normal-Dr. Timoteo Prado   • EXTERNAL FIXATOR APPLICATION     • GASTRIC BYPASS  1980s   • HERNIA REPAIR     • ORIF TIBIA/FIBULA FRACTURES Right 2005   • UPPER GASTROINTESTINAL ENDOSCOPY N/A 03/25/2007    No gross lesions in the esophagus, non-bleeding erythematous gastropathy, antrectomy with a small gastric pouch remnant, Billroth II anatomy with a gastrojejunostomy-Dr. Timoteo Prado     Family History   Problem Relation Age of Onset   • Aortic aneurysm Father         abdominal   • Heart disease Father    • Hypertension Brother    • Stroke Brother    • Intracerebral hemorrhage Brother    • Heart disease Maternal Grandfather    • Stroke Paternal Grandmother    • Heart disease Paternal Grandfather    • Diabetes Mother      Social History     Socioeconomic History   • Marital status:      Spouse name: Not on file   • Number of children: Not on file   • Years of education: Not on file   • Highest education level: Not on file   Occupational History   • Occupation: RETIRED   • Occupation: PART-TIME/ [employer]    Tobacco Use   • Smoking status: Never Smoker   • Smokeless tobacco: Never Used   • Tobacco comment: CAFFEINE USE: MOSTLY CAFFEINE FREE.    Substance and Sexual Activity   • Alcohol use: No     Frequency: Never     Comment:  very seldom   • Drug use: No   • Sexual activity: Defer         Current Outpatient Medications:   •  acetaminophen (TYLENOL) 500 MG tablet, Take 500 mg by mouth Every 6 (Six) Hours As Needed for Mild Pain ., Disp: , Rfl:   •  B Complex Vitamins (VITAMIN B COMPLEX) capsule capsule, Take 1 capsule by mouth Daily., Disp: , Rfl:   •  calcitriol (ROCALTROL) 0.5 MCG capsule, Take 0.75 mcg by mouth Daily., Disp: , Rfl:   •  cyclobenzaprine (FLEXERIL) 5 MG tablet, Take 5 mg by mouth As Needed., Disp: , Rfl:   •  diphenhydrAMINE (BENADRYL) 25 mg capsule, Take 25 mg by mouth every 6 (six) hours as needed for itching., Disp: , Rfl:   •  DULoxetine (CYMBALTA) 20 MG capsule, Take 20 mg by mouth 2 (Two) Times a Day., Disp: , Rfl:   •  ferrous sulfate (IRON SUPPLEMENT) 325 (65 FE) MG tablet, Take 325 mg by mouth Every Other Day., Disp: , Rfl:   •  levothyroxine (SYNTHROID, LEVOTHROID) 125 MCG tablet, Take 125 mcg by mouth Daily., Disp: , Rfl:   •  Multiple Vitamins-Minerals (ONE-A-DAY PROACTIVE 65+) tablet, Take 2 tablets by mouth Daily., Disp: , Rfl:   •  nystatin (MYCOSTATIN) 693671 UNIT/GM powder, 100,000 application., Disp: , Rfl:   •  oxybutynin (DITROPAN) 5 MG tablet, Take 5 mg by mouth 2 (Two) Times a Day., Disp: , Rfl:   •  Red Yeast Rice Extract 600 MG capsule, Take 600 mg by mouth 2 (Two) Times a Day., Disp: , Rfl:   •  traMADol (ULTRAM) 50 MG tablet, Take 50 mg by mouth 2 (Two) Times a Day As Needed., Disp: , Rfl:   •  TURMERIC PO, Take 1 tablet by mouth Daily., Disp: , Rfl:   •  famotidine (PEPCID) 10 MG tablet, Take 10 mg by mouth 2 (Two) Times a Day As Needed for heartburn., Disp: , Rfl:     Allergies   Allergen Reactions   • Morphine Itching   • Baclofen Dizziness and Arrhythmia   • Etodolac Hives   • Metronidazole Other (See Comments)     headaches   • Nitrofurantoin Other (See Comments)     headaches   • Tetracyclines & Related Hives   • Other Rash     LODINE  BLOTCHES ON SKIN          Review of Systems   HENT:  "Positive for tinnitus.    Gastrointestinal: Positive for abdominal pain, diarrhea and nausea.   Musculoskeletal: Positive for arthralgias, back pain and bursitis.   Neurological: Positive for weakness, light-headedness and headache.   Psychiatric/Behavioral: Positive for sleep disturbance and depressed mood.   All other systems reviewed and are negative.      Vitals:    06/17/19 1304   Pulse: 103   SpO2: 97%   Weight: 115 kg (253 lb 6.4 oz)   Height: 158.8 cm (62.5\")       PHYSICAL EXAM:    Pulse 103   Ht 158.8 cm (62.5\")   Wt 115 kg (253 lb 6.4 oz)   SpO2 97%   BMI 45.61 kg/m²   Body mass index is 45.61 kg/m².    Constitutional: well developed, well nourished, appears moderately healthy, stated age, increased oral motion  Eyes: sclera nonicteric, conjunctiva not injected   ENMT: Hearing intact, trachea midline, thyroid without masses  CVS: RRR, no murmur, peripheral edema 1+, varicosities on left leg  Respiratory: CTA, normal respiratory effort   Gastrointestinal: no hepatosplenomegaly, abdomen obese, with pannus, abdominal hernia suspected in the left spigellian region, with chevron incisional scar  Genitourinary: inguinal hernia not detected  Musculoskeletal: gait slowed and swinging, muscle mass normal incisional scar on lower anterior, ankle from recent surgery  Skin: warm and dry, no rashes visible  Neurological: awake and alert, seems to have reasonable capacity for understanding for medical decision making  Psychiatric: appears to have reasonable judgement, pleasant  Lymphatics: no cervical adenopathy    Radiographic Findings: CT scan from [place] daughter with apparent suggestion as to a spigellian hernia as well as recurrent ventral hernia.    SPECT CT: on both the early and delayed parathyroid scan there is focal  increased uptake within the upper thorax near the midline below the  thyroid gland. On SPECT imaging this projects over the region of the  anterior-superior mediastinum below the level of the " thoracic inlet.     IMPRESSION:  Focal increased uptake in the superior mediastinum  anteriorly suspicious for a parathyroid adenoma.    Lab Findings: As above    Pamphlet reviewed: Parathyroid    IMPRESSION:  · Hypercalcemia, vacillating, with elevated PTH.  This is accompanied by urine studies that suggest familial hypocalciuric hypercalcemia and thus we have discordant findings.  The PTH continues to go up suggesting hyperparathyroidism.  · Vitamin D deficiency, on calcitriol  · Chronic kidney disease stage II/III  · Depression, chronic on duloxetine, not felt attributable to hyperparathyroidism  · Hypothyroidism  Body mass index is 45.61 kg/m².  · Recurrent ventral hernia and suspected to the [place] and hernia, minimally symptomatic  · Chronic pain on tramadol  · Anemia on iron supplement, status post gastric bypass  · SPECT scan at [facility] suggesting mediastinal parathyroid adenoma.  This does not give me any idea as to how much below the sternum it is.    PLAN:  · SPECT CT at [facility].  Dr Marcelino Levine was contacted and he feels that it is safe to give her IV contrast with instructions to drink extra water for the 3 days surrounding the scan.    With this being mediastinal, i feel compelled to do so.    · Repeat lab work and 24-year calcium to include calcium, creatinine, PTH, magnesium, phosphorus, vitamin D, and 24-hour urine calcium and creatinine.  · Follow-up after the studies.  If in fact still not concordant, will refer her to endocrinology.  She is seeing Dr. Mazin Ortiz in the past  · Will address hernias after we get her calcium levels corrected  Went over pamphlet for parathyroid surgery.  The risk of parathyroid surgery were discussed with the patient including bleeding, infection, recurrent laryngeal nerve injury, hypocalcemia potentially necessitating temporary or permanent supplementation with calcium and/or activated vitamin D, with repeated labs.  We also discussed the accuracy rate of pre op  studies not being ideal and the potential that the affected gland may not be located and hypercalcemia may remain.  Of course, scarring will be present.  · We discussed the intended outpatient nature of this procedure but if the gland cannot be found in the expected location, and/or bilateral exploration is need, there is the possibility of the need for an overnight stay.  In this case, supplementation of a more complex nature will be more likely and for a more extended period.    Gianna Knowles MD  06/17/19  5:03 PM    In order to provide a more personal and interacitve patient experience as well as improve efficiency, this note was started prior to the day of visit.      ADDEND  Norma Ro's calcium to creatinine clearance ratio is 0.004, well below the discriminating value of 0.0115 for primary hyperparathyroidism, with 80% of individuals with FHH having a value <0.01.  Those with vit d deficiency can have a lower ratio while still having PHP.  Hers is low at 26.2, but this seems really abnormal.  I suspect that she has FHH despite a positive study.  I think we should get additional input from endocrinology.  She has seen Dr soto in the past and i will send her to him.  Gianna Knowles MD  06/26/19

## 2019-06-17 ENCOUNTER — OFFICE VISIT (OUTPATIENT)
Dept: SURGERY | Facility: CLINIC | Age: 69
End: 2019-06-17

## 2019-06-17 ENCOUNTER — APPOINTMENT (OUTPATIENT)
Dept: LAB | Facility: HOSPITAL | Age: 69
End: 2019-06-17

## 2019-06-17 VITALS — HEIGHT: 63 IN | BODY MASS INDEX: 44.9 KG/M2 | OXYGEN SATURATION: 97 % | HEART RATE: 103 BPM | WEIGHT: 253.4 LBS

## 2019-06-17 DIAGNOSIS — E83.52 HYPERCALCEMIA: Primary | ICD-10-CM

## 2019-06-17 PROCEDURE — 99214 OFFICE O/P EST MOD 30 MIN: CPT | Performed by: SURGERY

## 2019-06-20 ENCOUNTER — LAB (OUTPATIENT)
Dept: LAB | Facility: HOSPITAL | Age: 69
End: 2019-06-20

## 2019-06-20 DIAGNOSIS — E83.52 HYPERCALCEMIA: Primary | ICD-10-CM

## 2019-06-20 LAB
25(OH)D3 SERPL-MCNC: 26.2 NG/ML (ref 30–100)
ALBUMIN SERPL-MCNC: 3.6 G/DL (ref 3.5–5.2)
ANION GAP SERPL CALCULATED.3IONS-SCNC: 10.6 MMOL/L
BUN BLD-MCNC: 22 MG/DL (ref 8–23)
BUN/CREAT SERPL: 16.8 (ref 7–25)
CALCIUM 24H UR-MCNC: 1.2 MG/DL
CALCIUM 24H UR-MRATE: 28.2 MG/24 HR (ref 100–300)
CALCIUM SPEC-SCNC: 9.8 MG/DL (ref 8.6–10.5)
CALCIUM SPEC-SCNC: 9.9 MG/DL (ref 8.6–10.5)
CHLORIDE SERPL-SCNC: 106 MMOL/L (ref 98–107)
CO2 SERPL-SCNC: 26.4 MMOL/L (ref 22–29)
COLLECT DURATION TIME UR: 24 HRS
COLLECT DURATION TIME UR: 24 HRS
CREAT BLD-MCNC: 1.31 MG/DL (ref 0.57–1)
CREAT UR-MCNC: 38 MG/DL
CREATINE 24H UR-MRATE: 0.86 G/24 HR (ref 0.7–1.6)
GFR SERPL CREATININE-BSD FRML MDRD: 40 ML/MIN/1.73
GLUCOSE BLD-MCNC: 80 MG/DL (ref 65–99)
MAGNESIUM SERPL-MCNC: 2 MG/DL (ref 1.6–2.4)
PHOSPHATE SERPL-MCNC: 3.1 MG/DL (ref 2.5–4.5)
POTASSIUM BLD-SCNC: 5.1 MMOL/L (ref 3.5–5.2)
PTH-INTACT SERPL-MCNC: 125 PG/ML (ref 15–65)
SODIUM BLD-SCNC: 143 MMOL/L (ref 136–145)
SPECIMEN VOL 24H UR: 2250 ML
SPECIMEN VOL 24H UR: 2350 ML

## 2019-06-20 PROCEDURE — 82340 ASSAY OF CALCIUM IN URINE: CPT

## 2019-06-20 PROCEDURE — 82570 ASSAY OF URINE CREATININE: CPT

## 2019-06-20 PROCEDURE — 81050 URINALYSIS VOLUME MEASURE: CPT

## 2019-06-20 PROCEDURE — 82306 VITAMIN D 25 HYDROXY: CPT

## 2019-06-20 PROCEDURE — 83735 ASSAY OF MAGNESIUM: CPT

## 2019-06-20 PROCEDURE — 83970 ASSAY OF PARATHORMONE: CPT

## 2019-06-20 PROCEDURE — 80069 RENAL FUNCTION PANEL: CPT

## 2019-06-20 PROCEDURE — 82310 ASSAY OF CALCIUM: CPT

## 2019-06-20 PROCEDURE — 36415 COLL VENOUS BLD VENIPUNCTURE: CPT

## 2019-06-26 NOTE — PROGRESS NOTES
Chey (surgery office liaison),    Please call patient to inform her that her studies are not consistent with a parathryoid problem.  She should see Dr Mazin Ortiz, who she has seen before.  Please help her get that appt.    Dr rosenberg

## 2019-06-27 ENCOUNTER — TELEPHONE (OUTPATIENT)
Dept: SURGERY | Facility: CLINIC | Age: 69
End: 2019-06-27

## 2019-06-27 NOTE — TELEPHONE ENCOUNTER
----- Message from Gianna Knowles MD sent at 6/26/2019 12:29 PM EDT -----  Chey (surgery office liaison),    Please call patient to inform her that her studies are not consistent with a parathryoid problem.  She should see Dr Mazin Ortiz, who she has seen before.  Please help her get that appt.    Dr rosenberg

## 2019-07-01 ENCOUNTER — TELEPHONE (OUTPATIENT)
Dept: SURGERY | Facility: CLINIC | Age: 69
End: 2019-07-01

## 2019-07-01 NOTE — TELEPHONE ENCOUNTER
----- Message from Gianna Knowles MD sent at 7/1/2019  8:21 AM EDT -----  Chey (surgery office liaison),    Please send this report to Dr Mazin Ortiz office and Dr Marcelino Levine who are also seeing the patient.    Gianna Knowles MD

## 2019-09-11 ENCOUNTER — TRANSCRIBE ORDERS (OUTPATIENT)
Dept: ADMINISTRATIVE | Facility: HOSPITAL | Age: 69
End: 2019-09-11

## 2019-09-11 ENCOUNTER — LAB (OUTPATIENT)
Dept: LAB | Facility: HOSPITAL | Age: 69
End: 2019-09-11

## 2019-09-11 DIAGNOSIS — E83.52 HYPERCALCEMIA: ICD-10-CM

## 2019-09-11 DIAGNOSIS — N25.81 SECONDARY HYPERPARATHYROIDISM OF RENAL ORIGIN (HCC): ICD-10-CM

## 2019-09-11 DIAGNOSIS — N18.30 CHRONIC KIDNEY DISEASE, STAGE III (MODERATE) (HCC): ICD-10-CM

## 2019-09-11 DIAGNOSIS — N18.30 CHRONIC KIDNEY DISEASE, STAGE III (MODERATE) (HCC): Primary | ICD-10-CM

## 2019-09-11 LAB
25(OH)D3 SERPL-MCNC: 31 NG/ML (ref 30–100)
ALBUMIN SERPL-MCNC: 4.1 G/DL (ref 3.5–5.2)
ANION GAP SERPL CALCULATED.3IONS-SCNC: 7.3 MMOL/L (ref 5–15)
BUN BLD-MCNC: 25 MG/DL (ref 8–23)
BUN/CREAT SERPL: 17.6 (ref 7–25)
CALCIUM SPEC-SCNC: 10.4 MG/DL (ref 8.6–10.5)
CALCIUM SPEC-SCNC: 10.6 MG/DL (ref 8.6–10.5)
CHLORIDE SERPL-SCNC: 105 MMOL/L (ref 98–107)
CO2 SERPL-SCNC: 26.7 MMOL/L (ref 22–29)
CREAT BLD-MCNC: 1.42 MG/DL (ref 0.57–1)
GFR SERPL CREATININE-BSD FRML MDRD: 36 ML/MIN/1.73
GLUCOSE BLD-MCNC: 91 MG/DL (ref 65–99)
PHOSPHATE SERPL-MCNC: 3.6 MG/DL (ref 2.5–4.5)
POTASSIUM BLD-SCNC: 5 MMOL/L (ref 3.5–5.2)
PTH-INTACT SERPL-MCNC: 105 PG/ML (ref 15–65)
SODIUM BLD-SCNC: 139 MMOL/L (ref 136–145)

## 2019-09-11 PROCEDURE — 80069 RENAL FUNCTION PANEL: CPT

## 2019-09-11 PROCEDURE — 82306 VITAMIN D 25 HYDROXY: CPT

## 2019-09-11 PROCEDURE — 36415 COLL VENOUS BLD VENIPUNCTURE: CPT

## 2019-09-11 PROCEDURE — 82310 ASSAY OF CALCIUM: CPT

## 2019-09-11 PROCEDURE — 83970 ASSAY OF PARATHORMONE: CPT

## 2019-11-02 NOTE — PROGRESS NOTES
SURGERY  Norma KERRIE HernandezRo   1950 11/04/19    Chief Complaint:  hyperparathyroidism    HPI    Patient is a nice 69 y.o. female who returns after initial visit in late May with hypercalcemia, having seen Dr Mazin Ortiz in the interim.  She is been seen by both myself and Dr. Levine, and her PTH is greater than 200 but with a low urine calcium.  She repeated the urine studies which continued to show a low calcium prompting my referral to Dr. Ortiz.    He has seen the patient and believes that her urine studies are low in calcium because of a renal insufficiency and thaT in fact she does have hyperparathyroidism.  SPECT is read as a midline adenoma, in the mediastinum, 1.6 cm, just anterior to the left common carotid.    This was read as superior mediastinum in the upper thorax, below the thyroid.  SPECT CT scan done at [facility]/2019 showed in addition,  a finding of suspected 1.2 cm parathyroid adenoma deep to the right thyroid.  It was avid.  Her US was unrevealing.    She has chronic kidney disease stage III, with Vitamin D deficiency, on calcitriol, with vitamin D level at the end of last year being in the 30 range, PTH over 200, with calcium of 10.1 previously.  Her latest values are calcium of 10.6,  and vit D 36.  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  Thus we have a discordant picture.    Her symptoms are fatigue (chronic worsening), with sleep disturbance and sometimes 15 hours of sleep.  She has muscle and bone aches, osteopenia, a history of bone fractures, without history of pancreatitis or kidney stones.  She is not having difficulty with concentration.    She has a family history of Factor V so she was checked and was negative.  No family history of parathyroid problems, but her mother was hypothyroid.    She also brings up that she is having some abdominal pain, and feels a mass on her left side and thinks she has a hernia,  and produces a CT scan report on her phone that was done at [facility] in [place]    Past Medical History:   Diagnosis Date   • Anemia    • Arthritis    • Broken bones    • Chronic kidney disease    • Depression    • GERD (gastroesophageal reflux disease)    • History of blood clots    • History of cardiovascular stress test     normal Cardiolite 9/2016   • Hypertension    • Hypoglycemia     Rebound hypoglycemia   • Hypothyroidism    • Incisional hernia     s/p surgical repair, 2014   • Irregular heartbeat    • Morbid obesity (CMS/HCC)     s/p remote gastric bypass   • Sinus bradycardia    • UTI (urinary tract infection)    • Varicose veins      Past Surgical History:   Procedure Laterality Date   • ANKLE ARTHRODESIS Right 01/23/2019    Right open ankle arthrodesis, harvest of proximal tibial bone graft as major graft, exostectomy, medial great toe-Dr. Santy Romero   • CARPAL TUNNEL RELEASE Left    • CHOLECYSTECTOMY     • COLONOSCOPY N/A 11/3/2017    Procedure: COLONOSCOPY; polypectomy;  Surgeon: Chevy Zuniga MD;  Location: [facility] OR;  Service:    • COLONOSCOPY N/A 03/25/2007    Normal-Dr. Timoteo Prado   • EXTERNAL FIXATOR APPLICATION     • GASTRIC BYPASS  1980s   • HERNIA REPAIR     • ORIF TIBIA/FIBULA FRACTURES Right 2005   • UPPER GASTROINTESTINAL ENDOSCOPY N/A 03/25/2007    No gross lesions in the esophagus, non-bleeding erythematous gastropathy, antrectomy with a small gastric pouch remnant, Billroth II anatomy with a gastrojejunostomy-Dr. Timoteo Prado     Family History   Problem Relation Age of Onset   • Aortic aneurysm Father         abdominal   • Heart disease Father    • Hypertension Brother    • Stroke Brother    • Intracerebral hemorrhage Brother    • Heart disease Maternal Grandfather    • Stroke Paternal Grandmother    • Heart disease Paternal Grandfather    • Diabetes Mother      Social History     Socioeconomic History   • Marital status:      Spouse name: Not on file   •  Number of children: Not on file   • Years of education: Not on file   • Highest education level: Not on file   Occupational History   • Occupation: RETIRED   • Occupation: PART-TIME/ [employer]    Tobacco Use   • Smoking status: Never Smoker   • Smokeless tobacco: Never Used   • Tobacco comment: CAFFEINE USE: MOSTLY CAFFEINE FREE.    Substance and Sexual Activity   • Alcohol use: No     Frequency: Never     Comment: very seldom   • Drug use: No   • Sexual activity: Defer         Current Outpatient Medications:   •  acetaminophen (TYLENOL) 500 MG tablet, Take 500 mg by mouth Every 6 (Six) Hours As Needed for Mild Pain ., Disp: , Rfl:   •  amLODIPine (NORVASC) 5 MG tablet, , Disp: , Rfl:   •  B Complex Vitamins (VITAMIN B COMPLEX) capsule capsule, Take 1 capsule by mouth Daily., Disp: , Rfl:   •  calcitriol (ROCALTROL) 0.25 MCG capsule, Take 0.25 mcg by mouth Daily., Disp: , Rfl:   •  calcitriol (ROCALTROL) 0.5 MCG capsule, Take 0.5 mcg by mouth Daily., Disp: , Rfl:   •  cyclobenzaprine (FLEXERIL) 5 MG tablet, Take 5 mg by mouth As Needed., Disp: , Rfl:   •  diphenhydrAMINE (BENADRYL) 25 mg capsule, Take 25 mg by mouth every 6 (six) hours as needed for itching., Disp: , Rfl:   •  DULoxetine (CYMBALTA) 20 MG capsule, Take 20 mg by mouth 2 (Two) Times a Day., Disp: , Rfl:   •  famotidine (PEPCID) 10 MG tablet, Take 10 mg by mouth 2 (Two) Times a Day As Needed for heartburn., Disp: , Rfl:   •  ferrous sulfate (IRON SUPPLEMENT) 325 (65 FE) MG tablet, Take 325 mg by mouth Every Other Day., Disp: , Rfl:   •  gabapentin (NEURONTIN) 100 MG capsule, , Disp: , Rfl:   •  levothyroxine (SYNTHROID, LEVOTHROID) 112 MCG tablet, , Disp: , Rfl:   •  Multiple Vitamins-Minerals (ONE-A-DAY PROACTIVE 65+) tablet, Take 2 tablets by mouth Daily., Disp: , Rfl:   •  nystatin (MYCOSTATIN) 948320 UNIT/GM powder, 100,000 application., Disp: , Rfl:   •  oxybutynin (DITROPAN) 5 MG tablet, Take 5 mg by mouth 2 (Two) Times a Day., Disp: , Rfl:   •  Red  "Yeast Rice Extract 600 MG capsule, Take 600 mg by mouth 2 (Two) Times a Day., Disp: , Rfl:   •  TURMERIC PO, Take 1 tablet by mouth Daily., Disp: , Rfl:     Allergies   Allergen Reactions   • Morphine Itching   • Baclofen Dizziness and Arrhythmia   • Etodolac Hives   • Metronidazole Other (See Comments)     headaches   • Nitrofurantoin Other (See Comments)     headaches   • Tetracyclines & Related Hives   • Other Rash     LODINE  BLOTCHES ON SKIN          Review of Systems   HENT: Positive for tinnitus.    Gastrointestinal: Positive for abdominal pain, diarrhea and nausea.   Musculoskeletal: Positive for arthralgias, back pain and bursitis.   Neurological: Positive for weakness, light-headedness and headache.   Psychiatric/Behavioral: Positive for sleep disturbance and depressed mood.   All other systems reviewed and are negative.      Vitals:    11/04/19 1323   Pulse: 67   SpO2: 98%   Weight: 108 kg (239 lb)   Height: 158.8 cm (62.5\")       PHYSICAL EXAM:    Pulse 67   Ht 158.8 cm (62.5\")   Wt 108 kg (239 lb)   SpO2 98%   BMI 43.02 kg/m²   Body mass index is 43.02 kg/m².    Constitutional: well developed, well nourished, appears moderately healthy, stated age, increased oral motion  Eyes: sclera nonicteric, conjunctiva not injected   ENMT: Hearing intact, trachea midline, thyroid without masses  CVS: RRR, no murmur, peripheral edema 1+, varicosities on left leg  Respiratory: CTA, normal respiratory effort   Gastrointestinal: no hepatosplenomegaly, abdomen obese, with pannus, abdominal hernia suspected in the left and right aspect of her chevron incisional scar  Genitourinary: inguinal hernia not detected  Musculoskeletal: gait slowed and swinging, muscle mass normal incisional scar on lower anterior, ankle from recent surgery  Skin: warm and dry, no rashes visible  Neurological: awake and alert, seems to have reasonable capacity for understanding for medical decision making  Psychiatric: appears to have " reasonable judgement, pleasant  Lymphatics: no cervical adenopathy    Radiographic Findings: CT scan from [place] daughter with apparent suggestion as to a spigellian hernia as well as recurrent ventral hernia.    SPECT: on both the early and delayed parathyroid scan there is focal  increased uptake within the upper thorax near the midline below the  thyroid gland. On SPECT imaging this projects over the region of the  anterior-superior mediastinum below the level of the thoracic inlet.     IMPRESSION:  Focal increased uptake in the superior mediastinum  anteriorly suspicious for a parathyroid adenoma.    SPECT CT at [facility] with 1.6 cm anterior mediastinum, thoracic inlet parathyroid adenoma, just anterior to the origin of the left common carotid artery, with also a suspected 1.2 cm nodule perhaps deep to the right thyroid lobe with avidity.    Lab Findings: As above    Pamphlet reviewed: Parathyroid    IMPRESSION:  · Hypercalcemia, vacillating, with elevated PTH.   · Scan positive for mediastinal adenoma on the left, 1.6 cm, but also possible right sided parathyroid adenoma.  · Vitamin D deficiency, on calcitriol  · Chronic kidney disease stage II/III  · Depression, chronic on duloxetine, not felt attributable to hyperparathyroidism  · Hypothyroidism  Body mass index is 43.02 kg/m².  · Recurrent ventral hernia and suspected incisional hernia, minimally symptomatic, right greater than left in size, left greater than right and symptoms  · Chronic pain on tramadol  · Anemia on iron supplement, status post gastric bypass  · Planned ankle surgery, with desire to have this surgery done first.  She would be better off to have her parathyroid function appropriate prior to orthopedic surgery as her bone will not heal as well  PLAN:  · Went over pamphlet for parathyroid surgery.  The risk of parathyroid surgery were discussed with the patient including bleeding, infection, recurrent laryngeal nerve injury, hypocalcemia  potentially necessitating temporary or permanent supplementation with calcium and/or activated vitamin D, with repeated labs.  We also discussed the accuracy rate of pre op studies not being ideal and the potential that the affected gland may not be located and hypercalcemia may remain.  Of course, scarring will be present.  · We discussed the intended outpatient nature of this procedure but if the gland cannot be found in the expected location, and/or bilateral exploration with resection is needed, there is the possibility of the need for an overnight stay.  In this case, supplementation of a more complex nature will be more likely and for a more extended period.  · Discussed with patient increased perioperative risks associated with obesity including increased risks of DVT, infection, seromas, poor wound healing and hernias (with abdominal surgery).  · Discussed the possibility of dealing with her hernia surgery down the road but not at this point  · Asked for a lighted breast retractor to be available in the event we need to have mediastinal evaluation.  · Offered shape up for surgery program in preparation for hernia surgery if she needed that, but she lives in [place] and thus probably will not do so.  Happily she has lost 10 to 15 pounds.    Gianna Knowles MD  11/04/19

## 2019-11-02 NOTE — H&P (VIEW-ONLY)
SURGERY  Norma KERRIE HernandezOr   1950 11/04/19    Chief Complaint:  hyperparathyroidism    HPI    Patient is a nice 69 y.o. female who returns after initial visit in late May with hypercalcemia, having seen Dr Mazin Ortiz in the interim.  She is been seen by both myself and Dr. Levine, and her PTH is greater than 200 but with a low urine calcium.  She repeated the urine studies which continued to show a low calcium prompting my referral to Dr. Ortiz.    He has seen the patient and believes that her urine studies are low in calcium because of a renal insufficiency and thaT in fact she does have hyperparathyroidism.  SPECT is read as a midline adenoma, in the mediastinum, 1.6 cm, just anterior to the left common carotid.    This was read as superior mediastinum in the upper thorax, below the thyroid.  SPECT CT scan done at [facility] 6/2019 showed in addition,  a finding of suspected 1.2 cm parathyroid adenoma deep to the right thyroid.  It was avid.  Her US was unrevealing.    She has chronic kidney disease stage III, with Vitamin D deficiency, on calcitriol, with vitamin D level at the end of last year being in the 30 range, PTH over 200, with calcium of 10.1 previously.  Her latest values are calcium of 10.6,  and vit D 36.  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  Thus we have a discordant picture.    Her symptoms are fatigue (chronic worsening), with sleep disturbance and sometimes 15 hours of sleep.  She has muscle and bone aches, osteopenia, a history of bone fractures, without history of pancreatitis or kidney stones.  She is not having difficulty with concentration.    She has a family history of Factor V so she was checked and was negative.  No family history of parathyroid problems, but her mother was hypothyroid.    She also brings up that she is having some abdominal pain, and feels a mass on her left side and thinks she has a hernia,  and produces a CT scan report on her phone that was done at [facility] in [place]    Past Medical History:   Diagnosis Date   • Anemia    • Arthritis    • Broken bones    • Chronic kidney disease    • Depression    • GERD (gastroesophageal reflux disease)    • History of blood clots    • History of cardiovascular stress test     normal Cardiolite 9/2016   • Hypertension    • Hypoglycemia     Rebound hypoglycemia   • Hypothyroidism    • Incisional hernia     s/p surgical repair, 2014   • Irregular heartbeat    • Morbid obesity (CMS/HCC)     s/p remote gastric bypass   • Sinus bradycardia    • UTI (urinary tract infection)    • Varicose veins      Past Surgical History:   Procedure Laterality Date   • ANKLE ARTHRODESIS Right 01/23/2019    Right open ankle arthrodesis, harvest of proximal tibial bone graft as major graft, exostectomy, medial great toe-Dr. Santy Romero   • CARPAL TUNNEL RELEASE Left    • CHOLECYSTECTOMY     • COLONOSCOPY N/A 11/3/2017    Procedure: COLONOSCOPY; polypectomy;  Surgeon: Chevy Zuniga MD;  Location: [facility] OR;  Service:    • COLONOSCOPY N/A 03/25/2007    Normal-Dr. Timoteo Prado   • EXTERNAL FIXATOR APPLICATION     • GASTRIC BYPASS  1980s   • HERNIA REPAIR     • ORIF TIBIA/FIBULA FRACTURES Right 2005   • UPPER GASTROINTESTINAL ENDOSCOPY N/A 03/25/2007    No gross lesions in the esophagus, non-bleeding erythematous gastropathy, antrectomy with a small gastric pouch remnant, Billroth II anatomy with a gastrojejunostomy-Dr. Timoteo Prado     Family History   Problem Relation Age of Onset   • Aortic aneurysm Father         abdominal   • Heart disease Father    • Hypertension Brother    • Stroke Brother    • Intracerebral hemorrhage Brother    • Heart disease Maternal Grandfather    • Stroke Paternal Grandmother    • Heart disease Paternal Grandfather    • Diabetes Mother      Social History     Socioeconomic History   • Marital status:      Spouse name: Not on file   •  Number of children: Not on file   • Years of education: Not on file   • Highest education level: Not on file   Occupational History   • Occupation: RETIRED   • Occupation: PART-TIME/ [employer]    Tobacco Use   • Smoking status: Never Smoker   • Smokeless tobacco: Never Used   • Tobacco comment: CAFFEINE USE: MOSTLY CAFFEINE FREE.    Substance and Sexual Activity   • Alcohol use: No     Frequency: Never     Comment: very seldom   • Drug use: No   • Sexual activity: Defer         Current Outpatient Medications:   •  acetaminophen (TYLENOL) 500 MG tablet, Take 500 mg by mouth Every 6 (Six) Hours As Needed for Mild Pain ., Disp: , Rfl:   •  amLODIPine (NORVASC) 5 MG tablet, , Disp: , Rfl:   •  B Complex Vitamins (VITAMIN B COMPLEX) capsule capsule, Take 1 capsule by mouth Daily., Disp: , Rfl:   •  calcitriol (ROCALTROL) 0.25 MCG capsule, Take 0.25 mcg by mouth Daily., Disp: , Rfl:   •  calcitriol (ROCALTROL) 0.5 MCG capsule, Take 0.5 mcg by mouth Daily., Disp: , Rfl:   •  cyclobenzaprine (FLEXERIL) 5 MG tablet, Take 5 mg by mouth As Needed., Disp: , Rfl:   •  diphenhydrAMINE (BENADRYL) 25 mg capsule, Take 25 mg by mouth every 6 (six) hours as needed for itching., Disp: , Rfl:   •  DULoxetine (CYMBALTA) 20 MG capsule, Take 20 mg by mouth 2 (Two) Times a Day., Disp: , Rfl:   •  famotidine (PEPCID) 10 MG tablet, Take 10 mg by mouth 2 (Two) Times a Day As Needed for heartburn., Disp: , Rfl:   •  ferrous sulfate (IRON SUPPLEMENT) 325 (65 FE) MG tablet, Take 325 mg by mouth Every Other Day., Disp: , Rfl:   •  gabapentin (NEURONTIN) 100 MG capsule, , Disp: , Rfl:   •  levothyroxine (SYNTHROID, LEVOTHROID) 112 MCG tablet, , Disp: , Rfl:   •  Multiple Vitamins-Minerals (ONE-A-DAY PROACTIVE 65+) tablet, Take 2 tablets by mouth Daily., Disp: , Rfl:   •  nystatin (MYCOSTATIN) 527746 UNIT/GM powder, 100,000 application., Disp: , Rfl:   •  oxybutynin (DITROPAN) 5 MG tablet, Take 5 mg by mouth 2 (Two) Times a Day., Disp: , Rfl:   •  Red  "Yeast Rice Extract 600 MG capsule, Take 600 mg by mouth 2 (Two) Times a Day., Disp: , Rfl:   •  TURMERIC PO, Take 1 tablet by mouth Daily., Disp: , Rfl:     Allergies   Allergen Reactions   • Morphine Itching   • Baclofen Dizziness and Arrhythmia   • Etodolac Hives   • Metronidazole Other (See Comments)     headaches   • Nitrofurantoin Other (See Comments)     headaches   • Tetracyclines & Related Hives   • Other Rash     LODINE  BLOTCHES ON SKIN          Review of Systems   HENT: Positive for tinnitus.    Gastrointestinal: Positive for abdominal pain, diarrhea and nausea.   Musculoskeletal: Positive for arthralgias, back pain and bursitis.   Neurological: Positive for weakness, light-headedness and headache.   Psychiatric/Behavioral: Positive for sleep disturbance and depressed mood.   All other systems reviewed and are negative.      Vitals:    11/04/19 1323   Pulse: 67   SpO2: 98%   Weight: 108 kg (239 lb)   Height: 158.8 cm (62.5\")       PHYSICAL EXAM:    Pulse 67   Ht 158.8 cm (62.5\")   Wt 108 kg (239 lb)   SpO2 98%   BMI 43.02 kg/m²   Body mass index is 43.02 kg/m².    Constitutional: well developed, well nourished, appears moderately healthy, stated age, increased oral motion  Eyes: sclera nonicteric, conjunctiva not injected   ENMT: Hearing intact, trachea midline, thyroid without masses  CVS: RRR, no murmur, peripheral edema 1+, varicosities on left leg  Respiratory: CTA, normal respiratory effort   Gastrointestinal: no hepatosplenomegaly, abdomen obese, with pannus, abdominal hernia suspected in the left and right aspect of her chevron incisional scar  Genitourinary: inguinal hernia not detected  Musculoskeletal: gait slowed and swinging, muscle mass normal incisional scar on lower anterior, ankle from recent surgery  Skin: warm and dry, no rashes visible  Neurological: awake and alert, seems to have reasonable capacity for understanding for medical decision making  Psychiatric: appears to have " reasonable judgement, pleasant  Lymphatics: no cervical adenopathy    Radiographic Findings: CT scan from [place] daughter with apparent suggestion as to a spigellian hernia as well as recurrent ventral hernia.    SPECT: on both the early and delayed parathyroid scan there is focal  increased uptake within the upper thorax near the midline below the  thyroid gland. On SPECT imaging this projects over the region of the  anterior-superior mediastinum below the level of the thoracic inlet.     IMPRESSION:  Focal increased uptake in the superior mediastinum  anteriorly suspicious for a parathyroid adenoma.    SPECT CT at [facility] with 1.6 cm anterior mediastinum, thoracic inlet parathyroid adenoma, just anterior to the origin of the left common carotid artery, with also a suspected 1.2 cm nodule perhaps deep to the right thyroid lobe with avidity.    Lab Findings: As above    Pamphlet reviewed: Parathyroid    IMPRESSION:  · Hypercalcemia, vacillating, with elevated PTH.   · Scan positive for mediastinal adenoma on the left, 1.6 cm, but also possible right sided parathyroid adenoma.  · Vitamin D deficiency, on calcitriol  · Chronic kidney disease stage II/III  · Depression, chronic on duloxetine, not felt attributable to hyperparathyroidism  · Hypothyroidism  Body mass index is 43.02 kg/m².  · Recurrent ventral hernia and suspected incisional hernia, minimally symptomatic, right greater than left in size, left greater than right and symptoms  · Chronic pain on tramadol  · Anemia on iron supplement, status post gastric bypass  · Planned ankle surgery, with desire to have this surgery done first.  She would be better off to have her parathyroid function appropriate prior to orthopedic surgery as her bone will not heal as well  PLAN:  · Went over pamphlet for parathyroid surgery.  The risk of parathyroid surgery were discussed with the patient including bleeding, infection, recurrent laryngeal nerve injury, hypocalcemia  potentially necessitating temporary or permanent supplementation with calcium and/or activated vitamin D, with repeated labs.  We also discussed the accuracy rate of pre op studies not being ideal and the potential that the affected gland may not be located and hypercalcemia may remain.  Of course, scarring will be present.  · We discussed the intended outpatient nature of this procedure but if the gland cannot be found in the expected location, and/or bilateral exploration with resection is needed, there is the possibility of the need for an overnight stay.  In this case, supplementation of a more complex nature will be more likely and for a more extended period.  · Discussed with patient increased perioperative risks associated with obesity including increased risks of DVT, infection, seromas, poor wound healing and hernias (with abdominal surgery).  · Discussed the possibility of dealing with her hernia surgery down the road but not at this point  · Asked for a lighted breast retractor to be available in the event we need to have mediastinal evaluation.  · Offered shape up for surgery program in preparation for hernia surgery if she needed that, but she lives in [place] and thus probably will not do so.  Happily she has lost 10 to 15 pounds.    Gianna Knowles MD  11/04/19

## 2019-11-04 ENCOUNTER — OFFICE VISIT (OUTPATIENT)
Dept: SURGERY | Facility: CLINIC | Age: 69
End: 2019-11-04

## 2019-11-04 ENCOUNTER — PREP FOR SURGERY (OUTPATIENT)
Dept: OTHER | Facility: HOSPITAL | Age: 69
End: 2019-11-04

## 2019-11-04 VITALS — WEIGHT: 239 LBS | HEART RATE: 67 BPM | OXYGEN SATURATION: 98 % | HEIGHT: 63 IN | BODY MASS INDEX: 42.35 KG/M2

## 2019-11-04 DIAGNOSIS — E21.3 HYPERPARATHYROIDISM (HCC): Primary | ICD-10-CM

## 2019-11-04 DIAGNOSIS — E83.52 HYPERCALCEMIA: ICD-10-CM

## 2019-11-04 PROCEDURE — 99214 OFFICE O/P EST MOD 30 MIN: CPT | Performed by: SURGERY

## 2019-11-04 RX ORDER — OXYCODONE HCL 10 MG/1
10 TABLET, FILM COATED, EXTENDED RELEASE ORAL ONCE
Status: CANCELLED | OUTPATIENT
Start: 2019-11-20 | End: 2019-11-04

## 2019-11-04 RX ORDER — ONDANSETRON 2 MG/ML
4 INJECTION INTRAMUSCULAR; INTRAVENOUS EVERY 6 HOURS PRN
Status: CANCELLED | OUTPATIENT
Start: 2019-11-04

## 2019-11-04 RX ORDER — ACETAMINOPHEN 325 MG/1
650 TABLET ORAL ONCE
Status: CANCELLED | OUTPATIENT
Start: 2019-11-20 | End: 2019-11-04

## 2019-11-04 RX ORDER — AMLODIPINE BESYLATE 5 MG/1
5 TABLET ORAL EVERY MORNING
COMMUNITY
Start: 2019-11-03 | End: 2020-12-29 | Stop reason: ALTCHOICE

## 2019-11-04 RX ORDER — SODIUM CHLORIDE 0.9 % (FLUSH) 0.9 %
1-10 SYRINGE (ML) INJECTION AS NEEDED
Status: CANCELLED | OUTPATIENT
Start: 2019-11-20

## 2019-11-04 RX ORDER — GABAPENTIN 600 MG/1
300 TABLET ORAL 2 TIMES DAILY
COMMUNITY
Start: 2019-11-03

## 2019-11-04 RX ORDER — CALCITRIOL 0.25 UG/1
0.25 CAPSULE, LIQUID FILLED ORAL NIGHTLY
COMMUNITY
End: 2019-12-02

## 2019-11-04 RX ORDER — CEFAZOLIN SODIUM 2 G/100ML
2 INJECTION, SOLUTION INTRAVENOUS ONCE
Status: CANCELLED | OUTPATIENT
Start: 2019-11-20 | End: 2019-11-04

## 2019-11-04 RX ORDER — SODIUM CHLORIDE 0.9 % (FLUSH) 0.9 %
3 SYRINGE (ML) INJECTION EVERY 12 HOURS SCHEDULED
Status: CANCELLED | OUTPATIENT
Start: 2019-11-20

## 2019-11-04 RX ORDER — CALCITRIOL 0.5 UG/1
0.5 CAPSULE, LIQUID FILLED ORAL NIGHTLY
COMMUNITY
End: 2019-12-02

## 2019-11-04 RX ORDER — LEVOTHYROXINE SODIUM 0.12 MG/1
125 TABLET ORAL EVERY MORNING
Status: ON HOLD | COMMUNITY
Start: 2019-10-28 | End: 2023-01-04

## 2019-11-07 ENCOUNTER — OFFICE VISIT (OUTPATIENT)
Dept: CARDIOLOGY | Facility: CLINIC | Age: 69
End: 2019-11-07

## 2019-11-07 ENCOUNTER — APPOINTMENT (OUTPATIENT)
Dept: PREADMISSION TESTING | Facility: HOSPITAL | Age: 69
End: 2019-11-07

## 2019-11-07 VITALS
HEIGHT: 62 IN | SYSTOLIC BLOOD PRESSURE: 135 MMHG | HEART RATE: 50 BPM | TEMPERATURE: 98.4 F | WEIGHT: 243 LBS | DIASTOLIC BLOOD PRESSURE: 88 MMHG | BODY MASS INDEX: 44.72 KG/M2 | OXYGEN SATURATION: 100 % | RESPIRATION RATE: 20 BRPM

## 2019-11-07 VITALS
DIASTOLIC BLOOD PRESSURE: 80 MMHG | BODY MASS INDEX: 45.05 KG/M2 | HEART RATE: 54 BPM | WEIGHT: 244.8 LBS | HEIGHT: 62 IN | SYSTOLIC BLOOD PRESSURE: 146 MMHG

## 2019-11-07 DIAGNOSIS — E21.3 HYPERPARATHYROIDISM (HCC): ICD-10-CM

## 2019-11-07 DIAGNOSIS — R00.1 SINUS BRADYCARDIA: ICD-10-CM

## 2019-11-07 DIAGNOSIS — R93.1 AGATSTON CAC SCORE, <100: ICD-10-CM

## 2019-11-07 DIAGNOSIS — Z78.9 FALSE POSITIVE STRESS TEST: ICD-10-CM

## 2019-11-07 DIAGNOSIS — Z01.810 PREOP CARDIOVASCULAR EXAM: Primary | ICD-10-CM

## 2019-11-07 DIAGNOSIS — E66.01 MORBID OBESITY WITH BMI OF 40.0-44.9, ADULT (HCC): ICD-10-CM

## 2019-11-07 DIAGNOSIS — I10 ESSENTIAL HYPERTENSION: ICD-10-CM

## 2019-11-07 LAB
25(OH)D3 SERPL-MCNC: 36.4 NG/ML (ref 30–100)
ABO GROUP BLD: NORMAL
ANION GAP SERPL CALCULATED.3IONS-SCNC: 9.4 MMOL/L (ref 5–15)
BLD GP AB SCN SERPL QL: NEGATIVE
BUN BLD-MCNC: 16 MG/DL (ref 8–23)
BUN/CREAT SERPL: 14.4 (ref 7–25)
CALCIUM SPEC-SCNC: 9.7 MG/DL (ref 8.6–10.5)
CALCIUM SPEC-SCNC: 9.7 MG/DL (ref 8.6–10.5)
CHLORIDE SERPL-SCNC: 108 MMOL/L (ref 98–107)
CO2 SERPL-SCNC: 28.6 MMOL/L (ref 22–29)
CREAT BLD-MCNC: 1.11 MG/DL (ref 0.57–1)
DEPRECATED RDW RBC AUTO: 43.4 FL (ref 37–54)
ERYTHROCYTE [DISTWIDTH] IN BLOOD BY AUTOMATED COUNT: 13.1 % (ref 12.3–15.4)
GFR SERPL CREATININE-BSD FRML MDRD: 48 ML/MIN/1.73
GLUCOSE BLD-MCNC: 71 MG/DL (ref 65–99)
HCT VFR BLD AUTO: 38.2 % (ref 34–46.6)
HGB BLD-MCNC: 12.3 G/DL (ref 12–15.9)
MCH RBC QN AUTO: 29.1 PG (ref 26.6–33)
MCHC RBC AUTO-ENTMCNC: 32.2 G/DL (ref 31.5–35.7)
MCV RBC AUTO: 90.3 FL (ref 79–97)
PHOSPHATE SERPL-MCNC: 3.4 MG/DL (ref 2.5–4.5)
PLATELET # BLD AUTO: 296 10*3/MM3 (ref 140–450)
PMV BLD AUTO: 10.1 FL (ref 6–12)
POTASSIUM BLD-SCNC: 4.1 MMOL/L (ref 3.5–5.2)
PTH-INTACT SERPL-MCNC: 159 PG/ML (ref 15–65)
RBC # BLD AUTO: 4.23 10*6/MM3 (ref 3.77–5.28)
RH BLD: POSITIVE
SODIUM BLD-SCNC: 146 MMOL/L (ref 136–145)
T&S EXPIRATION DATE: NORMAL
WBC NRBC COR # BLD: 8.63 10*3/MM3 (ref 3.4–10.8)

## 2019-11-07 PROCEDURE — 99214 OFFICE O/P EST MOD 30 MIN: CPT | Performed by: INTERNAL MEDICINE

## 2019-11-07 PROCEDURE — 93005 ELECTROCARDIOGRAM TRACING: CPT

## 2019-11-07 PROCEDURE — 84100 ASSAY OF PHOSPHORUS: CPT | Performed by: SURGERY

## 2019-11-07 PROCEDURE — 82306 VITAMIN D 25 HYDROXY: CPT | Performed by: SURGERY

## 2019-11-07 PROCEDURE — 36415 COLL VENOUS BLD VENIPUNCTURE: CPT

## 2019-11-07 PROCEDURE — 93010 ELECTROCARDIOGRAM REPORT: CPT | Performed by: INTERNAL MEDICINE

## 2019-11-07 PROCEDURE — 83970 ASSAY OF PARATHORMONE: CPT | Performed by: SURGERY

## 2019-11-07 PROCEDURE — 86901 BLOOD TYPING SEROLOGIC RH(D): CPT | Performed by: SURGERY

## 2019-11-07 PROCEDURE — 85027 COMPLETE CBC AUTOMATED: CPT | Performed by: SURGERY

## 2019-11-07 PROCEDURE — 86850 RBC ANTIBODY SCREEN: CPT | Performed by: SURGERY

## 2019-11-07 PROCEDURE — 86900 BLOOD TYPING SEROLOGIC ABO: CPT | Performed by: SURGERY

## 2019-11-07 PROCEDURE — 80048 BASIC METABOLIC PNL TOTAL CA: CPT | Performed by: SURGERY

## 2019-11-07 NOTE — PROGRESS NOTES
Date of Office Visit: 2019  Encounter Provider: Mir Martinez MD  Place of Service: [facility] CARDIOLOGY  Patient Name: Norma Ro  :1950    Chief Complaint   Patient presents with   • Slow Heart Rate   :     HPI: Norma Ro is a 69 y.o. female who presents today for preoperative risk assessment.     She initially saw Dr. Adhikari in 2014 for preoperative risk assessment prior to hernia repair; he recommended no testing as she was aysmptomatic. Her pulse was 66 bpm at that time.     I met her in 2016 for the evaluation of sinus bradycardia.  She has hypothyroidism, and in 2016 she was hyperthyroid on her levothyroxine. The dose was decreased and her TSH jumped to 21 in mid 2016. Around that time, she also became concerned about her heart rate; a Holter monitor showed an average pulse of 58 bpm, with a range of 38-94. There were extremely rare ectopics, and no pauses.      She was then started on lisinopril for hypertension in 2016.  She has CKD and follows with Dr. Levine for this.     In 2017, she noted dyspnea and atypical chest pain.  A Cardiolite indicated anterior ischemia.  She wanted to avoid coronary angiography so coronary CTA was recommended.  Unfortunately, she was given oral metoprolol prior to the study per radiology protocol and became quite bradycardic and required overnight observation.  Her CTA showed no occlusive CAD; her CACS was 47.    She has chronic mild edema but has large varicose veins (and is morbidly obese). She was seen in the vein care center in 2016 and she declined a venous doppler (to check for reflux).  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  Compression hose were recommended but she couldn't wear them due to leg size.    She will be undergoing parathyroidectomy by Dr Knowles, and then ankle surgery by Dr Romero.   Her fatigue is at baseline.  She has chronic dyspnea with minimal  activity but this is unchanged. She denies chest pain or palpitations.    Past Medical History:   Diagnosis Date   • Agatston CAC score, <100     2017: 47   • Anemia    • Arthritis    • Broken bones    • Chronic kidney disease    • Depression    • False positive stress test     2017 -- coronary artery CTA showed no significant CAD   • GERD (gastroesophageal reflux disease)    • Headache     occ   • Hearing loss     asim hearing aids   • History of blood clots    • History of cardiovascular stress test     normal Cardiolite 9/2016   • Hypertension    • Hypoglycemia     Rebound hypoglycemia   • Hypothyroidism    • Incisional hernia     s/p surgical repair, 2014   • Incontinence in female    • Lower back pain    • Morbid obesity (CMS/HCC)     s/p remote gastric bypass   • Sinus bradycardia    • UTI (urinary tract infection)    • Varicose veins        Past Surgical History:   Procedure Laterality Date   • ANKLE ARTHRODESIS Right 01/23/2019    Right open ankle arthrodesis, harvest of proximal tibial bone graft as major graft, exostectomy, medial great toe-Dr. Santy Romero   • CARPAL TUNNEL RELEASE Left    • CHOLECYSTECTOMY     • COLONOSCOPY N/A 11/3/2017    Procedure: COLONOSCOPY; polypectomy;  Surgeon: Chevy Zuniga MD;  Location: [facility];  Service:    • COLONOSCOPY N/A 03/25/2007    Normal-Dr. Timoteo Prado   • EXTERNAL FIXATOR APPLICATION     • GASTRIC BYPASS  1980s   • HERNIA REPAIR     • ORIF TIBIA/FIBULA FRACTURES Right 2005   • UPPER GASTROINTESTINAL ENDOSCOPY N/A 03/25/2007    No gross lesions in the esophagus, non-bleeding erythematous gastropathy, antrectomy with a small gastric pouch remnant, Billroth II anatomy with a gastrojejunostomy-Dr. Timoteo Prado       Social History     Socioeconomic History   • Marital status:      Spouse name: Not on file   • Number of children: Not on file   • Years of education: Not on file   • Highest education level: Not on file   Occupational History   •  Occupation: RETIRED   • Occupation: PART-TIME/ [employer]    Tobacco Use   • Smoking status: Never Smoker   • Smokeless tobacco: Never Used   • Tobacco comment: CAFFEINE USE: MOSTLY CAFFEINE FREE.    Substance and Sexual Activity   • Alcohol use: No     Frequency: Never     Comment: very seldom   • Drug use: No   • Sexual activity: Defer       Family History   Problem Relation Age of Onset   • Aortic aneurysm Father         abdominal   • Heart disease Father    • Hypertension Brother    • Stroke Brother    • Intracerebral hemorrhage Brother    • Heart disease Maternal Grandfather    • Stroke Paternal Grandmother    • Heart disease Paternal Grandfather    • Diabetes Mother    • Malig Hyperthermia Neg Hx        Review of Systems   Constitution: Positive for decreased appetite and malaise/fatigue.   Cardiovascular: Positive for leg swelling. Negative for chest pain.   Respiratory: Negative for shortness of breath.    Musculoskeletal: Positive for joint pain and myalgias. Negative for back pain.   Neurological: Positive for excessive daytime sleepiness.   Psychiatric/Behavioral: Positive for depression.   All other systems reviewed and are negative.      Allergies   Allergen Reactions   • Morphine Itching     Chest tightness   • Baclofen Dizziness and Arrhythmia   • Etodolac Hives   • Metronidazole Other (See Comments)     headaches   • Nitrofurantoin Other (See Comments)     headaches   • Tetracyclines & Related Hives   • Other Rash     LODINE  BLOTCHES ON SKIN              Current Outpatient Medications:   •  acetaminophen (TYLENOL) 500 MG tablet, Take 500 mg by mouth Every 6 (Six) Hours As Needed for Mild Pain ., Disp: , Rfl:   •  amLODIPine (NORVASC) 5 MG tablet, Take 5 mg by mouth Every Morning., Disp: , Rfl:   •  B Complex Vitamins (VITAMIN B COMPLEX) capsule capsule, Take 1 capsule by mouth Every Night., Disp: , Rfl:   •  calcitriol (ROCALTROL) 0.25 MCG capsule, Take 0.25 mcg by mouth Every Night., Disp: , Rfl:   •   "calcitriol (ROCALTROL) 0.5 MCG capsule, Take 0.5 mcg by mouth Every Night., Disp: , Rfl:   •  Chlorhexidine Gluconate 2 % pads, Apply 1 application topically 2 (Two) Times a Day. Pre op, Disp: , Rfl:   •  cyclobenzaprine (FLEXERIL) 5 MG tablet, Take 5 mg by mouth As Needed., Disp: , Rfl:   •  diphenhydrAMINE (BENADRYL) 25 mg capsule, Take 25 mg by mouth every 6 (six) hours as needed for itching., Disp: , Rfl:   •  DULoxetine (CYMBALTA) 20 MG capsule, Take 20 mg by mouth 2 (Two) Times a Day., Disp: , Rfl:   •  famotidine (PEPCID) 10 MG tablet, Take 10 mg by mouth 2 (Two) Times a Day As Needed for heartburn., Disp: , Rfl:   •  ferrous sulfate (IRON SUPPLEMENT) 325 (65 FE) MG tablet, Take 325 mg by mouth Every Other Day., Disp: , Rfl:   •  gabapentin (NEURONTIN) 100 MG capsule, Take 100 mg by mouth As Needed., Disp: , Rfl:   •  levothyroxine (SYNTHROID, LEVOTHROID) 112 MCG tablet, Take 112 mcg by mouth Every Morning., Disp: , Rfl:   •  Multiple Vitamins-Minerals (ONE-A-DAY PROACTIVE 65+) tablet, Take 1 tablet by mouth Every Night., Disp: , Rfl:   •  nystatin (MYCOSTATIN) 397645 UNIT/GM powder, Apply 100,000 application topically to the appropriate area as directed As Needed., Disp: , Rfl:   •  oxybutynin (DITROPAN) 5 MG tablet, Take 5 mg by mouth 2 (Two) Times a Day., Disp: , Rfl:   •  Red Yeast Rice Extract 600 MG capsule, Take 600 mg by mouth 2 (Two) Times a Day., Disp: , Rfl:   •  traMADol (ULTRAM) 50 MG tablet, Take 50 mg by mouth At Night As Needed., Disp: , Rfl:   •  TURMERIC PO, Take 1 tablet by mouth Every Night., Disp: , Rfl:      Objective:     Vitals:    11/07/19 1251 11/07/19 1511   BP: 146/80    BP Location: Right arm    Patient Position: Sitting    Pulse: 100 54   Weight: 111 kg (244 lb 12.8 oz)    Height: 157.5 cm (62.01\")      Body mass index is 44.76 kg/m².    Physical Exam   Constitutional: She is oriented to person, place, and time.   Obese   HENT:   Head: Normocephalic.   Nose: Nose normal. "   Mouth/Throat: Oropharynx is clear and moist.   Eyes: Conjunctivae and EOM are normal. Pupils are equal, round, and reactive to light.   Neck: Normal range of motion.   Cannot assess for JVD due to habitus   Cardiovascular: Normal rate, regular rhythm, normal heart sounds and intact distal pulses.   No murmur heard.  Pulmonary/Chest: Effort normal.   Abdominal: Soft. There is no tenderness.   Obesity limits abdominal exam   Musculoskeletal: Normal range of motion. She exhibits edema (lipedema, no pitting).   Neurological: She is alert and oriented to person, place, and time. No cranial nerve deficit.   Skin: Skin is warm and dry. No rash noted.   Psychiatric: She has a normal mood and affect. Her behavior is normal. Judgment and thought content normal.   Vitals reviewed.      Procedures EKG --   I have personally reviewed EKG on 11/07/2019 and my interpretation of the tracing is as follows: NSR, PAC, normal, no change        Assessment:       Diagnosis Plan   1. Preop cardiovascular exam     2. Sinus bradycardia     3. Essential hypertension     4. False positive stress test     5. Agatston CAC score, <100     6. Morbid obesity with BMI of 40.0-44.9, adult (CMS/[facility])            Plan:     She is a history of relatively mild sinus bradycardia which is a symptomatic.  She does not require pacemaker.  She has good heart rate variability by previous monitoring studies.  She has hypertension which is mildly uncontrolled.  Her body habitus does make it hard to check her blood pressure.  She has a history of a false positive stress test without any significant coronary disease by a CT angiogram in 2017.    She is at low risk of major adverse cardiovascular events with both her parathyroidectomy as well as her orthopedic surgery.  She does not require any testing prior to the surgeries.    Ideally she would be on a statin to decrease her risk but she declines.    Sincerely,       Mir Martinez MD

## 2019-11-20 ENCOUNTER — ANESTHESIA (OUTPATIENT)
Dept: PERIOP | Facility: HOSPITAL | Age: 69
End: 2019-11-20

## 2019-11-20 ENCOUNTER — ANESTHESIA EVENT (OUTPATIENT)
Dept: PERIOP | Facility: HOSPITAL | Age: 69
End: 2019-11-20

## 2019-11-20 ENCOUNTER — HOSPITAL ENCOUNTER (OUTPATIENT)
Facility: HOSPITAL | Age: 69
Discharge: HOME | End: 2019-11-21
Attending: SURGERY | Admitting: SURGERY

## 2019-11-20 DIAGNOSIS — E21.3 HYPERPARATHYROIDISM (HCC): ICD-10-CM

## 2019-11-20 LAB
CALCIUM SPEC-SCNC: 9.2 MG/DL (ref 8.6–10.5)
PTH-INTACT SERPL-SCNC: 138.1 PG/ML (ref 15–65)
PTH-INTACT SERPL-SCNC: 22.1 PG/ML (ref 15–65)
PTH-INTACT SERPL-SCNC: 53.7 PG/ML (ref 15–65)

## 2019-11-20 PROCEDURE — A9270 NON-COVERED ITEM OR SERVICE: HCPCS | Performed by: SURGERY

## 2019-11-20 PROCEDURE — 63710000001 CALCIUM CARBONATE 500 MG CHEWABLE TABLET: Performed by: SURGERY

## 2019-11-20 PROCEDURE — 60500 EXPLORE PARATHYROID GLANDS: CPT | Performed by: SURGERY

## 2019-11-20 PROCEDURE — 88307 TISSUE EXAM BY PATHOLOGIST: CPT | Performed by: SURGERY

## 2019-11-20 PROCEDURE — 88334 PATH CONSLTJ SURG CYTO XM EA: CPT | Performed by: SURGERY

## 2019-11-20 PROCEDURE — 63710000001 OXYCODONE 10 MG TABLET EXTENDED-RELEASE 12 HOUR: Performed by: SURGERY

## 2019-11-20 PROCEDURE — 88331 PATH CONSLTJ SURG 1 BLK 1SPC: CPT | Performed by: SURGERY

## 2019-11-20 PROCEDURE — 25010000002 KETOROLAC TROMETHAMINE PER 15 MG: Performed by: ANESTHESIOLOGY

## 2019-11-20 PROCEDURE — 25010000002 PROPOFOL 10 MG/ML EMULSION: Performed by: NURSE ANESTHETIST, CERTIFIED REGISTERED

## 2019-11-20 PROCEDURE — 25010000003 CEFAZOLIN IN DEXTROSE 2-4 GM/100ML-% SOLUTION: Performed by: SURGERY

## 2019-11-20 PROCEDURE — 25010000002 ONDANSETRON PER 1 MG: Performed by: ANESTHESIOLOGY

## 2019-11-20 PROCEDURE — 88305 TISSUE EXAM BY PATHOLOGIST: CPT | Performed by: SURGERY

## 2019-11-20 PROCEDURE — 82310 ASSAY OF CALCIUM: CPT | Performed by: SURGERY

## 2019-11-20 PROCEDURE — 63710000001 CALCITRIOL 0.25 MCG CAPSULE: Performed by: SURGERY

## 2019-11-20 PROCEDURE — 63710000001 ACETAMINOPHEN 325 MG TABLET: Performed by: SURGERY

## 2019-11-20 PROCEDURE — 63710000001 OXYBUTYNIN 5 MG TABLET: Performed by: SURGERY

## 2019-11-20 PROCEDURE — 63710000001 DULOXETINE 20 MG CAPSULE DELAYED-RELEASE PARTICLES: Performed by: SURGERY

## 2019-11-20 PROCEDURE — 25010000002 FENTANYL CITRATE (PF) 100 MCG/2ML SOLUTION: Performed by: NURSE ANESTHETIST, CERTIFIED REGISTERED

## 2019-11-20 PROCEDURE — 83970 ASSAY OF PARATHORMONE: CPT | Performed by: SURGERY

## 2019-11-20 PROCEDURE — 63710000001 HYDROCODONE-ACETAMINOPHEN 10-325 MG TABLET: Performed by: SURGERY

## 2019-11-20 DEVICE — CLIP LIGAT VASC HORIZON TI MD BLU 24CT: Type: IMPLANTABLE DEVICE | Site: NECK | Status: FUNCTIONAL

## 2019-11-20 DEVICE — CLIP LIGAT VASC HORIZON TI SM/WD RED 24CT: Type: IMPLANTABLE DEVICE | Site: NECK | Status: FUNCTIONAL

## 2019-11-20 RX ORDER — OXYCODONE HCL 10 MG/1
10 TABLET, FILM COATED, EXTENDED RELEASE ORAL ONCE
Status: COMPLETED | OUTPATIENT
Start: 2019-11-20 | End: 2019-11-20

## 2019-11-20 RX ORDER — MIDAZOLAM HYDROCHLORIDE 1 MG/ML
2 INJECTION INTRAMUSCULAR; INTRAVENOUS
Status: DISCONTINUED | OUTPATIENT
Start: 2019-11-20 | End: 2019-11-20 | Stop reason: HOSPADM

## 2019-11-20 RX ORDER — PROMETHAZINE HYDROCHLORIDE 25 MG/ML
12.5 INJECTION, SOLUTION INTRAMUSCULAR; INTRAVENOUS ONCE AS NEEDED
Status: DISCONTINUED | OUTPATIENT
Start: 2019-11-20 | End: 2019-11-20 | Stop reason: HOSPADM

## 2019-11-20 RX ORDER — PROPOFOL 10 MG/ML
VIAL (ML) INTRAVENOUS AS NEEDED
Status: DISCONTINUED | OUTPATIENT
Start: 2019-11-20 | End: 2019-11-20 | Stop reason: SURG

## 2019-11-20 RX ORDER — ONDANSETRON 2 MG/ML
INJECTION INTRAMUSCULAR; INTRAVENOUS AS NEEDED
Status: DISCONTINUED | OUTPATIENT
Start: 2019-11-20 | End: 2019-11-20 | Stop reason: SURG

## 2019-11-20 RX ORDER — KETOROLAC TROMETHAMINE 30 MG/ML
INJECTION, SOLUTION INTRAMUSCULAR; INTRAVENOUS AS NEEDED
Status: DISCONTINUED | OUTPATIENT
Start: 2019-11-20 | End: 2019-11-20 | Stop reason: SURG

## 2019-11-20 RX ORDER — DULOXETIN HYDROCHLORIDE 20 MG/1
20 CAPSULE, DELAYED RELEASE ORAL 2 TIMES DAILY
Status: DISCONTINUED | OUTPATIENT
Start: 2019-11-20 | End: 2019-11-21 | Stop reason: HOSPADM

## 2019-11-20 RX ORDER — ONDANSETRON 2 MG/ML
4 INJECTION INTRAMUSCULAR; INTRAVENOUS EVERY 6 HOURS PRN
Status: DISCONTINUED | OUTPATIENT
Start: 2019-11-20 | End: 2019-11-20 | Stop reason: SDUPTHER

## 2019-11-20 RX ORDER — FENTANYL CITRATE 50 UG/ML
50 INJECTION, SOLUTION INTRAMUSCULAR; INTRAVENOUS
Status: DISCONTINUED | OUTPATIENT
Start: 2019-11-20 | End: 2019-11-20 | Stop reason: HOSPADM

## 2019-11-20 RX ORDER — SODIUM CHLORIDE 0.9 % (FLUSH) 0.9 %
1-10 SYRINGE (ML) INJECTION AS NEEDED
Status: DISCONTINUED | OUTPATIENT
Start: 2019-11-20 | End: 2019-11-20 | Stop reason: HOSPADM

## 2019-11-20 RX ORDER — HYDROCODONE BITARTRATE AND ACETAMINOPHEN 7.5; 325 MG/1; MG/1
1 TABLET ORAL ONCE AS NEEDED
Status: DISCONTINUED | OUTPATIENT
Start: 2019-11-20 | End: 2019-11-20 | Stop reason: HOSPADM

## 2019-11-20 RX ORDER — ONDANSETRON 2 MG/ML
4 INJECTION INTRAMUSCULAR; INTRAVENOUS EVERY 6 HOURS PRN
Status: DISCONTINUED | OUTPATIENT
Start: 2019-11-20 | End: 2019-11-21 | Stop reason: HOSPADM

## 2019-11-20 RX ORDER — SODIUM CHLORIDE 0.9 % (FLUSH) 0.9 %
3 SYRINGE (ML) INJECTION EVERY 12 HOURS SCHEDULED
Status: DISCONTINUED | OUTPATIENT
Start: 2019-11-20 | End: 2019-11-20 | Stop reason: HOSPADM

## 2019-11-20 RX ORDER — CALCITRIOL 0.25 UG/1
0.5 CAPSULE, LIQUID FILLED ORAL NIGHTLY
Status: DISCONTINUED | OUTPATIENT
Start: 2019-11-20 | End: 2019-11-21 | Stop reason: HOSPADM

## 2019-11-20 RX ORDER — SODIUM CHLORIDE 450 MG/100ML
45 INJECTION, SOLUTION INTRAVENOUS CONTINUOUS
Status: DISCONTINUED | OUTPATIENT
Start: 2019-11-20 | End: 2019-11-21

## 2019-11-20 RX ORDER — CEFAZOLIN SODIUM 2 G/100ML
2 INJECTION, SOLUTION INTRAVENOUS ONCE
Status: COMPLETED | OUTPATIENT
Start: 2019-11-20 | End: 2019-11-20

## 2019-11-20 RX ORDER — SODIUM CHLORIDE 0.9 % (FLUSH) 0.9 %
3-10 SYRINGE (ML) INJECTION AS NEEDED
Status: DISCONTINUED | OUTPATIENT
Start: 2019-11-20 | End: 2019-11-20 | Stop reason: HOSPADM

## 2019-11-20 RX ORDER — PROMETHAZINE HYDROCHLORIDE 25 MG/1
25 SUPPOSITORY RECTAL ONCE AS NEEDED
Status: DISCONTINUED | OUTPATIENT
Start: 2019-11-20 | End: 2019-11-20 | Stop reason: HOSPADM

## 2019-11-20 RX ORDER — MIDAZOLAM HYDROCHLORIDE 1 MG/ML
1 INJECTION INTRAMUSCULAR; INTRAVENOUS
Status: DISCONTINUED | OUTPATIENT
Start: 2019-11-20 | End: 2019-11-20 | Stop reason: HOSPADM

## 2019-11-20 RX ORDER — DIPHENHYDRAMINE HCL 25 MG
25 CAPSULE ORAL
Status: DISCONTINUED | OUTPATIENT
Start: 2019-11-20 | End: 2019-11-20 | Stop reason: HOSPADM

## 2019-11-20 RX ORDER — HYDRALAZINE HYDROCHLORIDE 20 MG/ML
5 INJECTION INTRAMUSCULAR; INTRAVENOUS
Status: DISCONTINUED | OUTPATIENT
Start: 2019-11-20 | End: 2019-11-20 | Stop reason: HOSPADM

## 2019-11-20 RX ORDER — SODIUM CHLORIDE, SODIUM LACTATE, POTASSIUM CHLORIDE, CALCIUM CHLORIDE 600; 310; 30; 20 MG/100ML; MG/100ML; MG/100ML; MG/100ML
9 INJECTION, SOLUTION INTRAVENOUS CONTINUOUS PRN
Status: DISCONTINUED | OUTPATIENT
Start: 2019-11-20 | End: 2019-11-20 | Stop reason: HOSPADM

## 2019-11-20 RX ORDER — PROMETHAZINE HYDROCHLORIDE 25 MG/ML
6.25 INJECTION, SOLUTION INTRAMUSCULAR; INTRAVENOUS
Status: DISCONTINUED | OUTPATIENT
Start: 2019-11-20 | End: 2019-11-20 | Stop reason: HOSPADM

## 2019-11-20 RX ORDER — HYDROCODONE BITARTRATE AND ACETAMINOPHEN 10; 325 MG/1; MG/1
1 TABLET ORAL EVERY 4 HOURS PRN
Status: DISCONTINUED | OUTPATIENT
Start: 2019-11-20 | End: 2019-11-21 | Stop reason: HOSPADM

## 2019-11-20 RX ORDER — OXYBUTYNIN CHLORIDE 5 MG/1
5 TABLET ORAL 2 TIMES DAILY
Status: DISCONTINUED | OUTPATIENT
Start: 2019-11-20 | End: 2019-11-21 | Stop reason: HOSPADM

## 2019-11-20 RX ORDER — FENTANYL CITRATE 50 UG/ML
INJECTION, SOLUTION INTRAMUSCULAR; INTRAVENOUS AS NEEDED
Status: DISCONTINUED | OUTPATIENT
Start: 2019-11-20 | End: 2019-11-20 | Stop reason: SURG

## 2019-11-20 RX ORDER — EPHEDRINE SULFATE 50 MG/ML
INJECTION, SOLUTION INTRAVENOUS AS NEEDED
Status: DISCONTINUED | OUTPATIENT
Start: 2019-11-20 | End: 2019-11-20 | Stop reason: SURG

## 2019-11-20 RX ORDER — GABAPENTIN 100 MG/1
100 CAPSULE ORAL NIGHTLY PRN
Status: DISCONTINUED | OUTPATIENT
Start: 2019-11-20 | End: 2019-11-21 | Stop reason: HOSPADM

## 2019-11-20 RX ORDER — ONDANSETRON 2 MG/ML
4 INJECTION INTRAMUSCULAR; INTRAVENOUS ONCE AS NEEDED
Status: DISCONTINUED | OUTPATIENT
Start: 2019-11-20 | End: 2019-11-20 | Stop reason: HOSPADM

## 2019-11-20 RX ORDER — AMLODIPINE BESYLATE 5 MG/1
5 TABLET ORAL EVERY MORNING
Status: DISCONTINUED | OUTPATIENT
Start: 2019-11-21 | End: 2019-11-21 | Stop reason: HOSPADM

## 2019-11-20 RX ORDER — LABETALOL HYDROCHLORIDE 5 MG/ML
5 INJECTION, SOLUTION INTRAVENOUS
Status: DISCONTINUED | OUTPATIENT
Start: 2019-11-20 | End: 2019-11-20 | Stop reason: HOSPADM

## 2019-11-20 RX ORDER — CYCLOBENZAPRINE HCL 10 MG
5 TABLET ORAL 2 TIMES DAILY PRN
Status: DISCONTINUED | OUTPATIENT
Start: 2019-11-20 | End: 2019-11-21 | Stop reason: HOSPADM

## 2019-11-20 RX ORDER — NALOXONE HCL 0.4 MG/ML
0.1 VIAL (ML) INJECTION
Status: DISCONTINUED | OUTPATIENT
Start: 2019-11-20 | End: 2019-11-21 | Stop reason: HOSPADM

## 2019-11-20 RX ORDER — LEVOTHYROXINE SODIUM 112 UG/1
112 TABLET ORAL EVERY MORNING
Status: DISCONTINUED | OUTPATIENT
Start: 2019-11-21 | End: 2019-11-21 | Stop reason: HOSPADM

## 2019-11-20 RX ORDER — HYDROMORPHONE HYDROCHLORIDE 1 MG/ML
0.5 INJECTION, SOLUTION INTRAMUSCULAR; INTRAVENOUS; SUBCUTANEOUS
Status: DISCONTINUED | OUTPATIENT
Start: 2019-11-20 | End: 2019-11-21 | Stop reason: HOSPADM

## 2019-11-20 RX ORDER — DIPHENHYDRAMINE HCL 25 MG
25 CAPSULE ORAL EVERY 6 HOURS PRN
Status: DISCONTINUED | OUTPATIENT
Start: 2019-11-20 | End: 2019-11-21 | Stop reason: HOSPADM

## 2019-11-20 RX ORDER — ONDANSETRON 4 MG/1
4 TABLET, FILM COATED ORAL EVERY 6 HOURS PRN
Status: DISCONTINUED | OUTPATIENT
Start: 2019-11-20 | End: 2019-11-21 | Stop reason: HOSPADM

## 2019-11-20 RX ORDER — FAMOTIDINE 10 MG/ML
20 INJECTION, SOLUTION INTRAVENOUS
Status: COMPLETED | OUTPATIENT
Start: 2019-11-20 | End: 2019-11-20

## 2019-11-20 RX ORDER — ACETAMINOPHEN 500 MG
500 TABLET ORAL EVERY 6 HOURS PRN
Status: DISCONTINUED | OUTPATIENT
Start: 2019-11-20 | End: 2019-11-21 | Stop reason: HOSPADM

## 2019-11-20 RX ORDER — FAMOTIDINE 20 MG/1
10 TABLET, FILM COATED ORAL 2 TIMES DAILY PRN
Status: DISCONTINUED | OUTPATIENT
Start: 2019-11-20 | End: 2019-11-21 | Stop reason: HOSPADM

## 2019-11-20 RX ORDER — EPHEDRINE SULFATE 50 MG/ML
5 INJECTION, SOLUTION INTRAVENOUS ONCE AS NEEDED
Status: DISCONTINUED | OUTPATIENT
Start: 2019-11-20 | End: 2019-11-20 | Stop reason: HOSPADM

## 2019-11-20 RX ORDER — NALOXONE HCL 0.4 MG/ML
0.2 VIAL (ML) INJECTION AS NEEDED
Status: DISCONTINUED | OUTPATIENT
Start: 2019-11-20 | End: 2019-11-20 | Stop reason: HOSPADM

## 2019-11-20 RX ORDER — ACETAMINOPHEN 325 MG/1
650 TABLET ORAL ONCE
Status: COMPLETED | OUTPATIENT
Start: 2019-11-20 | End: 2019-11-20

## 2019-11-20 RX ORDER — FLUMAZENIL 0.1 MG/ML
0.2 INJECTION INTRAVENOUS AS NEEDED
Status: DISCONTINUED | OUTPATIENT
Start: 2019-11-20 | End: 2019-11-20 | Stop reason: HOSPADM

## 2019-11-20 RX ORDER — LIDOCAINE HYDROCHLORIDE 20 MG/ML
INJECTION, SOLUTION INFILTRATION; PERINEURAL AS NEEDED
Status: DISCONTINUED | OUTPATIENT
Start: 2019-11-20 | End: 2019-11-20 | Stop reason: SURG

## 2019-11-20 RX ORDER — BUPIVACAINE HYDROCHLORIDE AND EPINEPHRINE 5; 5 MG/ML; UG/ML
INJECTION, SOLUTION PERINEURAL AS NEEDED
Status: DISCONTINUED | OUTPATIENT
Start: 2019-11-20 | End: 2019-11-20 | Stop reason: HOSPADM

## 2019-11-20 RX ORDER — PROMETHAZINE HYDROCHLORIDE 25 MG/1
25 TABLET ORAL ONCE AS NEEDED
Status: DISCONTINUED | OUTPATIENT
Start: 2019-11-20 | End: 2019-11-20 | Stop reason: HOSPADM

## 2019-11-20 RX ORDER — DIPHENHYDRAMINE HYDROCHLORIDE 50 MG/ML
12.5 INJECTION INTRAMUSCULAR; INTRAVENOUS
Status: DISCONTINUED | OUTPATIENT
Start: 2019-11-20 | End: 2019-11-20 | Stop reason: HOSPADM

## 2019-11-20 RX ORDER — OXYCODONE AND ACETAMINOPHEN 7.5; 325 MG/1; MG/1
1 TABLET ORAL ONCE AS NEEDED
Status: DISCONTINUED | OUTPATIENT
Start: 2019-11-20 | End: 2019-11-20 | Stop reason: HOSPADM

## 2019-11-20 RX ORDER — ROCURONIUM BROMIDE 10 MG/ML
INJECTION, SOLUTION INTRAVENOUS AS NEEDED
Status: DISCONTINUED | OUTPATIENT
Start: 2019-11-20 | End: 2019-11-20 | Stop reason: SURG

## 2019-11-20 RX ORDER — CALCIUM CARBONATE 200(500)MG
2 TABLET,CHEWABLE ORAL 2 TIMES DAILY
Status: DISCONTINUED | OUTPATIENT
Start: 2019-11-20 | End: 2019-11-21

## 2019-11-20 RX ORDER — ACETAMINOPHEN 325 MG/1
650 TABLET ORAL ONCE AS NEEDED
Status: DISCONTINUED | OUTPATIENT
Start: 2019-11-20 | End: 2019-11-20 | Stop reason: HOSPADM

## 2019-11-20 RX ORDER — TRAMADOL HYDROCHLORIDE 50 MG/1
50 TABLET ORAL EVERY 4 HOURS PRN
Status: DISCONTINUED | OUTPATIENT
Start: 2019-11-20 | End: 2019-11-21 | Stop reason: HOSPADM

## 2019-11-20 RX ORDER — HYDROMORPHONE HYDROCHLORIDE 1 MG/ML
0.5 INJECTION, SOLUTION INTRAMUSCULAR; INTRAVENOUS; SUBCUTANEOUS
Status: DISCONTINUED | OUTPATIENT
Start: 2019-11-20 | End: 2019-11-20 | Stop reason: HOSPADM

## 2019-11-20 RX ORDER — MAGNESIUM HYDROXIDE 1200 MG/15ML
LIQUID ORAL AS NEEDED
Status: DISCONTINUED | OUTPATIENT
Start: 2019-11-20 | End: 2019-11-20 | Stop reason: HOSPADM

## 2019-11-20 RX ADMIN — SUGAMMADEX 250 MG: 100 INJECTION, SOLUTION INTRAVENOUS at 16:28

## 2019-11-20 RX ADMIN — SODIUM CHLORIDE, POTASSIUM CHLORIDE, SODIUM LACTATE AND CALCIUM CHLORIDE: 600; 310; 30; 20 INJECTION, SOLUTION INTRAVENOUS at 15:16

## 2019-11-20 RX ADMIN — ACETAMINOPHEN 650 MG: 325 TABLET, FILM COATED ORAL at 11:50

## 2019-11-20 RX ADMIN — OXYCODONE HYDROCHLORIDE 10 MG: 10 TABLET, FILM COATED, EXTENDED RELEASE ORAL at 11:50

## 2019-11-20 RX ADMIN — ROCURONIUM BROMIDE 50 MG: 10 INJECTION INTRAVENOUS at 14:57

## 2019-11-20 RX ADMIN — FENTANYL CITRATE 50 MCG: 50 INJECTION INTRAMUSCULAR; INTRAVENOUS at 14:54

## 2019-11-20 RX ADMIN — SODIUM CHLORIDE, POTASSIUM CHLORIDE, SODIUM LACTATE AND CALCIUM CHLORIDE 9 ML/HR: 600; 310; 30; 20 INJECTION, SOLUTION INTRAVENOUS at 12:01

## 2019-11-20 RX ADMIN — CEFAZOLIN SODIUM 2 G: 2 INJECTION, SOLUTION INTRAVENOUS at 14:48

## 2019-11-20 RX ADMIN — CALCITRIOL 0.5 MCG: 0.25 CAPSULE ORAL at 20:27

## 2019-11-20 RX ADMIN — HYDROCODONE BITARTRATE AND ACETAMINOPHEN 1 TABLET: 10; 325 TABLET ORAL at 23:21

## 2019-11-20 RX ADMIN — EPHEDRINE SULFATE 10 MG: 50 INJECTION INTRAMUSCULAR; INTRAVENOUS; SUBCUTANEOUS at 15:15

## 2019-11-20 RX ADMIN — EPHEDRINE SULFATE 10 MG: 50 INJECTION INTRAMUSCULAR; INTRAVENOUS; SUBCUTANEOUS at 15:12

## 2019-11-20 RX ADMIN — PROPOFOL 120 MG: 10 INJECTION, EMULSION INTRAVENOUS at 14:57

## 2019-11-20 RX ADMIN — LIDOCAINE HYDROCHLORIDE 100 MG: 20 INJECTION, SOLUTION INFILTRATION; PERINEURAL at 14:57

## 2019-11-20 RX ADMIN — DULOXETINE HYDROCHLORIDE 20 MG: 20 CAPSULE, DELAYED RELEASE ORAL at 20:27

## 2019-11-20 RX ADMIN — ANTACID TABLETS 2 TABLET: 500 TABLET, CHEWABLE ORAL at 20:27

## 2019-11-20 RX ADMIN — ONDANSETRON 4 MG: 2 INJECTION INTRAMUSCULAR; INTRAVENOUS at 16:28

## 2019-11-20 RX ADMIN — FENTANYL CITRATE 50 MCG: 50 INJECTION INTRAMUSCULAR; INTRAVENOUS at 16:50

## 2019-11-20 RX ADMIN — KETOROLAC TROMETHAMINE 15 MG: 30 INJECTION, SOLUTION INTRAMUSCULAR; INTRAVENOUS at 16:55

## 2019-11-20 RX ADMIN — OXYBUTYNIN CHLORIDE 5 MG: 5 TABLET ORAL at 20:26

## 2019-11-20 RX ADMIN — SODIUM CHLORIDE 45 ML/HR: 4.5 INJECTION, SOLUTION INTRAVENOUS at 22:06

## 2019-11-20 RX ADMIN — FAMOTIDINE 20 MG: 10 INJECTION INTRAVENOUS at 12:01

## 2019-11-20 NOTE — ANESTHESIA PROCEDURE NOTES
Airway  Urgency: elective    Date/Time: 11/20/2019 3:00 PM  Airway not difficult    General Information and Staff    Patient location during procedure: OR  Anesthesiologist: Willy Subramanian MD  CRNA: Sarah Perez CRNA    Indications and Patient Condition  Indications for airway management: airway protection    Preoxygenated: yes  Mask difficulty assessment: 2 - vent by mask + OA or adjuvant +/- NMBA    Final Airway Details  Final airway type: endotracheal airway      Successful airway: ETT  Cuffed: yes   Successful intubation technique: direct laryngoscopy  Facilitating devices/methods: cricoid pressure  Endotracheal tube insertion site: oral  Blade: Baltazar  Blade size: 2  ETT size (mm): 7.0  Cormack-Lehane Classification: grade IIa - partial view of glottis  Placement verified by: chest auscultation and capnometry   Cuff volume (mL): 5  Measured from: lips  ETT/EBT  to lips (cm): 20  Number of attempts at approach: 1  Assessment: lips, teeth, and gum same as pre-op and atraumatic intubation

## 2019-11-20 NOTE — ANESTHESIA PREPROCEDURE EVALUATION
Anesthesia Evaluation     Patient summary reviewed                Airway   Mallampati: II  TM distance: >3 FB  Neck ROM: full  No difficulty expected  Dental      Pulmonary    Cardiovascular     ECG reviewed  Rhythm: regular    (+) hypertension,       Neuro/Psych  (+) psychiatric history,     GI/Hepatic/Renal/Endo    (+) obesity (truncal),   renal disease,     Musculoskeletal     Abdominal    Substance History      OB/GYN          Other                        Anesthesia Plan    ASA 3     general       Anesthetic plan, all risks, benefits, and alternatives have been provided, discussed and informed consent has been obtained with: patient.  Use of blood products discussed with patient .

## 2019-11-20 NOTE — OP NOTE
Operative Note  Parathyroid  11/20/19      Pre-op Diagnosis:   · Hypercalcemia, vacillating, with elevated PTH.   · Scan positive for mediastinal adenoma on the left, 1.6 cm, but also possible right sided parathyroid adenoma.  · Vitamin D deficiency, on calcitriol  · Chronic kidney disease stage II/III    Post-op Diagnosis:  · Parathyroid hyperplasia, left inferior, right inferior  · Thyroid mass    Procedure:   · Parathyroid resection left and right inferior.  · Resect thyroid mass, right    Surgeon: Eleni    Assistant: OLY Pineda    Indications:  · Nice 69-year-old who comes in with bone disease, with need for ankle surgery, with hypercalcemia with elevated PTH and scan positive as above    Associated Issues:  · Depression, chronic on duloxetine, not felt attributable to hyperparathyroidism  · Hypothyroidism  Body mass index is 43.02 kg/m².  · Recurrent ventral hernia and suspected incisional hernia, minimally symptomatic, right greater than left in size, left greater than right and symptoms  · Chronic pain on tramadol  · Anemia on iron supplement, status post gastric bypass  · Planned ankle surgery, with desire to have this surgery done first.  She would be better off to have her parathyroid function appropriate prior to orthopedic surgery as her bone will not heal as well  · Pre op calcium: 9.7  · Pre op PTH: 159  · Pre op vit D: 36    Findings:   · Intraoperative STAT PTH preoperatively 138  · Intraoperative STAT PTH post-resection of left inferior 53.7  · Intraoperative STAT PTH post-resection of left and right inferior, pending  · Gross findings:    · Left inferior parathyroid very enlarged, rotated posteriorly, in the mediastinum, approximately 1.7 cm in length  · Left superior parathyroid palpated and left in place  · Left recurrent laryngeal nerve posterior, not disturbed  · Right inferior parathyroid enlarged, approximately 1.2 cm  · Small right superior parathyroid, noted on a stalk and left  intact though a portion of it may be included in the thyroid mass  · Unusual thyroid mass, rotated very far posteriorly, with a nodular unusual appearance and thus resected  · Pathology frozen:    Hyperplasia on left inferior and right inferior parathyroid    Recommendations:   · Begin calcium 1000 mg bid and continue calcitriol 0.50 micrograms daily.    EBL: <50 ml    Technique:     General anesthetic was induced.  IV Kefzol was given.  The neck was extended, and then prepped with Hibiclens and draped sterilely.    The neck was examined and the skin creases evaluated to determine the best location for the incision, attempting to maximize both operative exposure and post op cosmesis.  I selected a natural skin crease, and marked.      1/2% Marcaine with epinephrine was used to inject the site.  Incision was made thru the skin and subcutaneous space and then completed with cautery to the cervical fascia.  Flaps were then raised with the facelift retractors and cautery, directly on the anterior aspect of the fascia, both superiorly and inferiorly.  The strap muscles were then divided in the midline.    Dissection was then begun under the strap muscle on the left side.  The bipolar on 15 was used.  The dissection was fairly straightforward, but she had a somewhat asymmetric neck, with the carotid coming up dramatically on the patient's right.  Dissection under the clavicle on the left was fairly straightforward, and a fairly quickly could feel the mass, lifted that up, and began to dissect around it.    Prior to resecting it however I do not want to make sure identified the nerve, and had to take down the middle thyroid vein in order to do so.  With this I resected the inferior parathyroid with the bipolars and sent to pathology.    I then began to search for the superior.  I felt like I really could feel it deep to some fatty tissue and thus opted not to disturb that any further, and hopes to keep it in excellent  operative state.    I then went to the right side, dissected inferiorly, and found what appeared to be another parathyroid that was mildly enlarged not dramatically.  I thus similarly took down the middle thyroid vein on that side and began to look more closely.  There was a very large mass that extended down into the mediastinum on the right, which ultimately I was able to dissect free, lift up, and ultimately thought this probably was just a thyroid nodule, it had an unusual appearance however in this I decided to go ahead and resect that.  I did find a very small parathyroid attached to that the superior aspect, pulled that back to the left that it placed.    With this I decided to go ahead and resect the inferior parathyroid in toto, and did so with the bipolar.  I placed clips across the thyroid and then divided that with the bipolar, taking out the thyroid mass that was about 2 cm in length.  I then dissected very far posteriorly to identify the nerve with certainty, adjacent to the trachea.    I then ensured hemostasis on both sides.  Surgicel was placed.    The wound was then closed with a running 3-0 Vicryl to the midline strap muscles, followed by interrupted 3-0 Vicryl to the platysma, interrupted 3-0 Vicryl to the subcutaneous, and running 5-0 Vicryl to the skin.  Dermabond was applied to the wound.    Gianna Knowles MD  11/20/19  4:49 PM

## 2019-11-20 NOTE — ANESTHESIA POSTPROCEDURE EVALUATION
"Patient: Norma Ro    Procedure Summary     Date:  11/20/19 Room / Location:  [facility] OR 06 / [facility] MAIN OR    Anesthesia Start:  1447 Anesthesia Stop:  1712    Procedure:  left inferior parathyroid mediastinal resection and right inferior parathyroid resection, thyroid mass resection (N/A Neck) Diagnosis:       Hyperparathyroidism (CMS/HCC)      (Hyperparathyroidism (CMS/HCC) [E21.3])    Surgeon:  Gianna Knowles MD Provider:  Sreekanth Herrera MD    Anesthesia Type:  general ASA Status:  3          Anesthesia Type: general  Last vitals  BP   163/82 (11/20/19 1735)   Temp   37.1 °C (98.8 °F) (11/20/19 1709)   Pulse   63 (11/20/19 1735)   Resp   20 (11/20/19 1735)     SpO2   99 % (11/20/19 1735)     Post Anesthesia Care and Evaluation    Patient location during evaluation: PACU  Patient participation: complete - patient participated  Level of consciousness: awake  Pain management: adequate  Airway patency: patent  Anesthetic complications: No anesthetic complications    Cardiovascular status: acceptable  Respiratory status: acceptable  Hydration status: acceptable    Comments: /82   Pulse 63   Temp 37.1 °C (98.8 °F) (Oral)   Resp 20   Ht 157.5 cm (62\")   Wt 111 kg (244 lb 7 oz)   SpO2 99%   BMI 44.71 kg/m²       "

## 2019-11-21 VITALS
HEART RATE: 60 BPM | OXYGEN SATURATION: 95 % | SYSTOLIC BLOOD PRESSURE: 133 MMHG | BODY MASS INDEX: 44.98 KG/M2 | HEIGHT: 62 IN | TEMPERATURE: 97.8 F | WEIGHT: 244.44 LBS | RESPIRATION RATE: 16 BRPM | DIASTOLIC BLOOD PRESSURE: 73 MMHG

## 2019-11-21 LAB
ALBUMIN SERPL-MCNC: 3.3 G/DL (ref 3.5–5.2)
ALBUMIN/GLOB SERPL: 1.4 G/DL
ALP SERPL-CCNC: 68 U/L (ref 39–117)
ALT SERPL W P-5'-P-CCNC: 14 U/L (ref 1–33)
ANION GAP SERPL CALCULATED.3IONS-SCNC: 10.8 MMOL/L (ref 5–15)
AST SERPL-CCNC: 29 U/L (ref 1–32)
BILIRUB SERPL-MCNC: 0.2 MG/DL (ref 0.2–1.2)
BUN BLD-MCNC: 16 MG/DL (ref 8–23)
BUN/CREAT SERPL: 13.3 (ref 7–25)
CA-I BLD-MCNC: 5.1 MG/DL (ref 4.6–5.4)
CA-I SERPL ISE-MCNC: 1.27 MMOL/L (ref 1.15–1.35)
CALCIUM SPEC-SCNC: 8.3 MG/DL (ref 8.6–10.5)
CALCIUM SPEC-SCNC: 8.3 MG/DL (ref 8.6–10.5)
CALCIUM SPEC-SCNC: 8.5 MG/DL (ref 8.6–10.5)
CALCIUM SPEC-SCNC: 8.5 MG/DL (ref 8.6–10.5)
CHLORIDE SERPL-SCNC: 107 MMOL/L (ref 98–107)
CO2 SERPL-SCNC: 25.2 MMOL/L (ref 22–29)
CREAT BLD-MCNC: 1.2 MG/DL (ref 0.57–1)
GFR SERPL CREATININE-BSD FRML MDRD: 44 ML/MIN/1.73
GLOBULIN UR ELPH-MCNC: 2.3 GM/DL
GLUCOSE BLD-MCNC: 88 MG/DL (ref 65–99)
MAGNESIUM SERPL-MCNC: 1.7 MG/DL (ref 1.6–2.4)
PHOSPHATE SERPL-MCNC: 4 MG/DL (ref 2.5–4.5)
POTASSIUM BLD-SCNC: 4.9 MMOL/L (ref 3.5–5.2)
PROT SERPL-MCNC: 5.6 G/DL (ref 6–8.5)
SODIUM BLD-SCNC: 143 MMOL/L (ref 136–145)

## 2019-11-21 PROCEDURE — 63710000001 HYDROCODONE-ACETAMINOPHEN 10-325 MG TABLET: Performed by: SURGERY

## 2019-11-21 PROCEDURE — 63710000001 LEVOTHYROXINE 112 MCG TABLET: Performed by: SURGERY

## 2019-11-21 PROCEDURE — 63710000001 AMLODIPINE 5 MG TABLET: Performed by: SURGERY

## 2019-11-21 PROCEDURE — 83735 ASSAY OF MAGNESIUM: CPT | Performed by: INTERNAL MEDICINE

## 2019-11-21 PROCEDURE — A9270 NON-COVERED ITEM OR SERVICE: HCPCS | Performed by: SURGERY

## 2019-11-21 PROCEDURE — 99024 POSTOP FOLLOW-UP VISIT: CPT | Performed by: SURGERY

## 2019-11-21 PROCEDURE — 80053 COMPREHEN METABOLIC PANEL: CPT | Performed by: INTERNAL MEDICINE

## 2019-11-21 PROCEDURE — 60500 EXPLORE PARATHYROID GLANDS: CPT | Performed by: PHYSICIAN ASSISTANT

## 2019-11-21 PROCEDURE — 63710000001 CALCIUM CARBONATE 500 MG CHEWABLE TABLET: Performed by: SURGERY

## 2019-11-21 PROCEDURE — 63710000001 OXYBUTYNIN 5 MG TABLET: Performed by: SURGERY

## 2019-11-21 PROCEDURE — 82310 ASSAY OF CALCIUM: CPT | Performed by: SURGERY

## 2019-11-21 PROCEDURE — 63710000001 DULOXETINE 20 MG CAPSULE DELAYED-RELEASE PARTICLES: Performed by: SURGERY

## 2019-11-21 PROCEDURE — 84100 ASSAY OF PHOSPHORUS: CPT | Performed by: INTERNAL MEDICINE

## 2019-11-21 PROCEDURE — 82330 ASSAY OF CALCIUM: CPT | Performed by: INTERNAL MEDICINE

## 2019-11-21 RX ORDER — CALCIUM CARBONATE 200(500)MG
2 TABLET,CHEWABLE ORAL 3 TIMES DAILY
Status: DISCONTINUED | OUTPATIENT
Start: 2019-11-21 | End: 2019-11-21 | Stop reason: HOSPADM

## 2019-11-21 RX ADMIN — ANTACID TABLETS 2 TABLET: 500 TABLET, CHEWABLE ORAL at 14:26

## 2019-11-21 RX ADMIN — DULOXETINE HYDROCHLORIDE 20 MG: 20 CAPSULE, DELAYED RELEASE ORAL at 08:12

## 2019-11-21 RX ADMIN — LEVOTHYROXINE SODIUM 112 MCG: 112 TABLET ORAL at 06:07

## 2019-11-21 RX ADMIN — OXYBUTYNIN CHLORIDE 5 MG: 5 TABLET ORAL at 08:12

## 2019-11-21 RX ADMIN — AMLODIPINE BESYLATE 5 MG: 5 TABLET ORAL at 06:07

## 2019-11-21 RX ADMIN — HYDROCODONE BITARTRATE AND ACETAMINOPHEN 1 TABLET: 10; 325 TABLET ORAL at 06:07

## 2019-11-21 RX ADMIN — HYDROCODONE BITARTRATE AND ACETAMINOPHEN 1 TABLET: 10; 325 TABLET ORAL at 12:22

## 2019-11-21 RX ADMIN — ANTACID TABLETS 2 TABLET: 500 TABLET, CHEWABLE ORAL at 08:12

## 2019-11-21 NOTE — PROGRESS NOTES
"GENERAL SURGERY  Norma Ro  1950  1 Day Post-Op    CC:  \"i can't hear the TV unless it's up really loud\"    HPI:  Good night.  No complaints.  Not hoarse or tingly.     Diet:  Clears.     Vitals:    11/20/19 1825 11/20/19 2216 11/21/19 0151 11/21/19 0501   BP: 162/80 155/82 140/76 136/70   BP Location:  Right arm Right arm Right arm   Patient Position:  Lying Lying Lying   Pulse: 72 58 54 59   Resp: 18 16 16 16   Temp: 97.7 °F (36.5 °C) 97.9 °F (36.6 °C) 97.4 °F (36.3 °C) 97.1 °F (36.2 °C)   TempSrc: Oral Oral Oral Oral   SpO2: 92% 98% 100% 97%   Weight:       Height:         Intake & Output (last day)       11/20 0701 - 11/21 0700 11/21 0701 - 11/22 0700    I.V. (mL/kg) 1000 (9)     Total Intake(mL/kg) 1000 (9)     Urine (mL/kg/hr) 1400     Total Output 1400     Net -400                 Physical Exam  Neck without swelling or bruising.  Pertinent labs/imaging:      Results from last 7 days   Lab Units 11/21/19  0510 11/20/19  1803   CALCIUM mg/dL 8.5* 9.2       Assessment:   · 1 Day Post-Op S/P left inferior and right inferior parathyroid resection with right thyroid mass resection.  · Hypocalcemia, post op as expected.  The question will be if it continues to drop.  · Vitamin D deficiency, on calcitriol.  Was prescribed for 0.75 daily but only on 0.5 daily.  · Chronic kidney disease stage II/III  · Depression, chronic on duloxetine, not felt attributable to hyperparathyroidism  · Hypothyroidism  · Body mass index is 43.02 kg/m².  · Recurrent ventral hernia and suspected incisional hernia, minimally symptomatic, right greater than left in size, left greater than right and symptoms  · Chronic pain on tramadol.  · Anemia on iron supplement, status post gastric bypass  · Planned ankle surgery    Plan  · Increased tums supplement from 1000 bid to 1000 tid.  · Check calcium at noon.  If continues down, will need to stay.  · Await renal input on medicine dosing and discharge.    Gianna Knowles, " MD  11/21/19  7:45 AM    ADDEND  Calcium at noon down further to 8.3.  Wrote for recheck at 6 pm.  Renal to see and make adjustments either for discharge or for hospital stay with discharge later.  Gianna Knowles MD  11/21/19

## 2019-11-21 NOTE — PROGRESS NOTES
Discharge Planning Assessment  [facility]     Patient Name: Norma Ro  MRN: [id number]  Today's Date: 11/21/2019    Admit Date: 11/20/2019    Discharge Needs Assessment     Row Name 11/21/19 1549       Living Environment    Lives With  spouse    Name(s) of Who Lives With Patient  -- : Alan    Current Living Arrangements  home/apartment/condo    Primary Care Provided by  self    Provides Primary Care For  no one    Family Caregiver if Needed  spouse    Family Caregiver Names  -- : Alan    Quality of Family Relationships  supportive    Able to Return to Prior Arrangements  yes       Resource/Environmental Concerns    Resource/Environmental Concerns  none    Transportation Concerns  car, none       Transition Planning    Patient/Family Anticipates Transition to  home with family    Patient/Family Anticipated Services at Transition  none    Transportation Anticipated  family or friend will provide       Discharge Needs Assessment    Readmission Within the Last 30 Days  no previous admission in last 30 days    Concerns to be Addressed  no discharge needs identified;denies needs/concerns at this time;adjustment to diagnosis/illness    Equipment Currently Used at Home  other (see comments) Brace on L foot    Anticipated Changes Related to Illness  none    Equipment Needed After Discharge  none        Discharge Plan     Row Name 11/21/19 1546       Plan    Plan  Home w/o needs    Provided post acute provider list?  Yes    Post Acute Provider Lists  Nursing Home;Home Health    Delivered To  Patient    Method of Delivery  In person    Patient/Family in Agreement with Plan  yes    Plan Comments  I met with pt and her  Alan Archibald, pt confirmed the address & PCP are correct.  Pt said she uses both [facility] and [facility] in [place], [place].  Pt said she is IADL, only DME she uses is a brace on her L foot and she will have surgery on her L foot in January at [facility].  Pt said she has never had  home health or been to a SNF and plans to return home at discharge and does not anticipate any needs.     Nisa Ho RN    Final Discharge Disposition Code  01 - home or self-care     Expected Discharge Date and Time     Expected Discharge Date Expected Discharge Time    Nov 21, 2019         Demographic Summary     Row Name 11/21/19 1549       General Information    Admission Type  other (see comments) Outpatient in a bed    Arrived From  home    Referral Source  admission list    Reason for Consult  discharge planning    Preferred Language  English     Used During This Interaction  no        Functional Status     Row Name 11/21/19 1549       Functional Status, IADL    Medications  independent    Meal Preparation  independent    Housekeeping  independent    Laundry  independent    Shopping  independent     Patient Forms     Row Name 11/21/19 1548       Patient Forms    Provider Choice List  Delivered    Delivered to  Patient    Method of delivery  In person            Nisa Ho, RN

## 2019-11-21 NOTE — PLAN OF CARE
Problem: Patient Care Overview  Goal: Plan of Care Review  Outcome: Ongoing (interventions implemented as appropriate)   11/21/19 0433   Coping/Psychosocial   Plan of Care Reviewed With patient   Plan of Care Review   Progress no change   OTHER   Outcome Summary PO pain meds given. VSS. Voiding freely. Tolerating clear liquid diet. Ambulate in halls with assist. Cont to monitor.      Goal: Individualization and Mutuality  Outcome: Ongoing (interventions implemented as appropriate)    Goal: Discharge Needs Assessment  Outcome: Ongoing (interventions implemented as appropriate)    Goal: Interprofessional Rounds/Family Conf  Outcome: Ongoing (interventions implemented as appropriate)      Problem: Pain, Acute (Adult)  Goal: Identify Related Risk Factors and Signs and Symptoms  Outcome: Ongoing (interventions implemented as appropriate)    Goal: Acceptable Pain Control/Comfort Level  Outcome: Ongoing (interventions implemented as appropriate)

## 2019-11-21 NOTE — CONSULTS
Referring Provider: Eleni  Reason for Consultation: Hyperparathyroidism, ckd3    Subjective     Chief complaint No chief complaint on file.      History of present illness:  68 yo Female with CKD 3 secondary to NSAID use followed by Dr. Levine. Baseline creatinine 1-1.2,  HTN, hyperparathyroidism.  Admitted yesterday for left and right inferior parathyroid gland and right thyroid gland mass resection. Calcium immediately post op 9.2.  Today 8.3.  Sore at incision site.  Swallowing fine. Eating. Bowels moving. Urinating. Face itching.  Nose was itching pre-op, now entire face itching. No twitching. No tingling or numbness. No muscle spasm.     Past Medical History:   Diagnosis Date   • Agatston CAC score, <100     2017: 47   • Anemia    • Arthritis    • Broken bones    • Chronic kidney disease    • Depression    • False positive stress test     2017 -- coronary artery CTA showed no significant CAD   • GERD (gastroesophageal reflux disease)    • Headache     occ   • Hearing loss     asim hearing aids   • History of blood clots    • History of cardiovascular stress test     normal Cardiolite 9/2016   • Hypertension    • Hypoglycemia     Rebound hypoglycemia   • Hypothyroidism    • Incisional hernia     s/p surgical repair, 2014   • Incontinence in female    • Lower back pain    • Morbid obesity (CMS/HCC)     s/p remote gastric bypass   • Sinus bradycardia    • UTI (urinary tract infection)    • Varicose veins      Past Surgical History:   Procedure Laterality Date   • ANKLE ARTHRODESIS Right 01/23/2019    Right open ankle arthrodesis, harvest of proximal tibial bone graft as major graft, exostectomy, medial great toe-Dr. Santy Romero   • CARPAL TUNNEL RELEASE Left    • CHOLECYSTECTOMY     • COLONOSCOPY N/A 11/3/2017    Procedure: COLONOSCOPY; polypectomy;  Surgeon: Chevy Zuniga MD;  Location: [facility];  Service:    • COLONOSCOPY N/A 03/25/2007    Normal-Dr. Timoteo Prado   • EXTERNAL FIXATOR  APPLICATION     • GASTRIC BYPASS  1980s   • HERNIA REPAIR     • ORIF TIBIA/FIBULA FRACTURES Right 2005   • PARATHYROIDECTOMY N/A 11/20/2019    Procedure: left inferior parathyroid mediastinal resection and right inferior parathyroid resection, thyroid mass resection;  Surgeon: Gianna Knowles MD;  Location: [facility];  Service: General   • UPPER GASTROINTESTINAL ENDOSCOPY N/A 03/25/2007    No gross lesions in the esophagus, non-bleeding erythematous gastropathy, antrectomy with a small gastric pouch remnant, Billroth II anatomy with a gastrojejunostomy-Dr. Timoteo Prado     Family History   Problem Relation Age of Onset   • Aortic aneurysm Father         abdominal   • Heart disease Father    • Hypertension Brother    • Stroke Brother    • Intracerebral hemorrhage Brother    • Heart disease Maternal Grandfather    • Stroke Paternal Grandmother    • Heart disease Paternal Grandfather    • Diabetes Mother    • Malig Hyperthermia Neg Hx        Social History     Tobacco Use   • Smoking status: Never Smoker   • Smokeless tobacco: Never Used   • Tobacco comment: CAFFEINE USE: MOSTLY CAFFEINE FREE.    Substance Use Topics   • Alcohol use: No     Frequency: Never     Comment: very seldom   • Drug use: No     Medications Prior to Admission   Medication Sig Dispense Refill Last Dose   • amLODIPine (NORVASC) 5 MG tablet Take 5 mg by mouth Every Morning.   11/20/2019 at 0800   • B Complex Vitamins (VITAMIN B COMPLEX) capsule capsule Take 1 capsule by mouth Every Night.   11/19/2019 at 2200   • calcitriol (ROCALTROL) 0.5 MCG capsule Take 0.5 mcg by mouth Every Night.   11/19/2019 at 2200   • Chlorhexidine Gluconate 2 % pads Apply 1 application topically 2 (Two) Times a Day. Pre op   11/20/2019 at 0800   • DULoxetine (CYMBALTA) 20 MG capsule Take 20 mg by mouth 2 (Two) Times a Day.   11/19/2019 at 2200   • gabapentin (NEURONTIN) 100 MG capsule Take 100 mg by mouth As Needed.   11/19/2019 at 2200   • levothyroxine (SYNTHROID,  "LEVOTHROID) 112 MCG tablet Take 112 mcg by mouth Every Morning.   11/20/2019 at 0800   • Multiple Vitamins-Minerals (ONE-A-DAY PROACTIVE 65+) tablet Take 1 tablet by mouth Every Night.   11/19/2019 at 2200   • oxybutynin (DITROPAN) 5 MG tablet Take 5 mg by mouth 2 (Two) Times a Day.   11/19/2019 at 2200   • Red Yeast Rice Extract 600 MG capsule Take 600 mg by mouth 2 (Two) Times a Day.   11/19/2019 at 2200   • traMADol (ULTRAM) 50 MG tablet Take 50 mg by mouth At Night As Needed.   11/19/2019 at 2200   • TURMERIC PO Take 1 tablet by mouth Every Night.   11/19/2019 at 2200   • acetaminophen (TYLENOL) 500 MG tablet Take 500 mg by mouth Every 6 (Six) Hours As Needed for Mild Pain .   Unknown at Unknown time   • calcitriol (ROCALTROL) 0.25 MCG capsule Take 0.25 mcg by mouth Every Night.   Unknown at Unknown time   • cyclobenzaprine (FLEXERIL) 5 MG tablet Take 5 mg by mouth As Needed.   11/15/2019   • diphenhydrAMINE (BENADRYL) 25 mg capsule Take 25 mg by mouth every 6 (six) hours as needed for itching.   More than a month at Unknown time   • famotidine (PEPCID) 10 MG tablet Take 10 mg by mouth 2 (Two) Times a Day As Needed for heartburn.   Unknown at Unknown time   • ferrous sulfate (IRON SUPPLEMENT) 325 (65 FE) MG tablet Take 325 mg by mouth Every Other Day.   11/18/2019   • nystatin (MYCOSTATIN) 836315 UNIT/GM powder Apply 100,000 application topically to the appropriate area as directed As Needed.   More than a month at Unknown time     Allergies:  Morphine; Baclofen; Etodolac; Metronidazole; Nitrofurantoin; Tetracyclines & related; and Other    Review of Systems  Urinating. Bowels moving. Face itching. Eating. Not soa. Walking. Incision sore. No difficulty with swallowing.     Objective     Vital Signs  Temp:  [97.1 °F (36.2 °C)-98.8 °F (37.1 °C)] 97.5 °F (36.4 °C)  Heart Rate:  [54-72] 63  Resp:  [16-20] 16  BP: (130-171)/(69-97) 130/69    Flowsheet Rows      First Filed Value   Admission Height  157.5 cm (62\") " Documented at 11/20/2019 1133   Admission Weight  111 kg (244 lb 7 oz) Documented at 11/20/2019 1133           I/O this shift:  In: 780 [P.O.:780]  Out: 300 [Urine:300]  I/O last 3 completed shifts:  In: 1000 [I.V.:1000]  Out: 1400 [Urine:1400]    Intake/Output Summary (Last 24 hours) at 11/21/2019 1543  Last data filed at 11/21/2019 1300  Gross per 24 hour   Intake 780 ml   Output 1700 ml   Net -920 ml       Physical Exam:  General Awake, alert. Oriented. Appears stated age  Oral mucosa moist. Edentulous. No scleral icterus  Neck low incision intact  Heart RRR no s3 or rub  Lungs clear to auscultation  Abd +bs soft, nontender. No body wall edema  Ext 1+ lower ext edema.  Skin no rash.   No Chvostek sign.     Results Review:  Results for orders placed or performed during the hospital encounter of 11/20/19   PTH Intraoperative   Result Value Ref Range    PTH, Intraoperative 138.1 (H) 15.0 - 65.0 pg/mL   PTH Intraoperative   Result Value Ref Range    PTH, Intraoperative 53.7 15.0 - 65.0 pg/mL   PTH Intraoperative   Result Value Ref Range    PTH, Intraoperative 22.1 15.0 - 65.0 pg/mL   Calcium   Result Value Ref Range    Calcium 9.2 8.6 - 10.5 mg/dL   Calcium   Result Value Ref Range    Calcium 8.5 (L) 8.6 - 10.5 mg/dL   Calcium   Result Value Ref Range    Calcium 8.3 (L) 8.6 - 10.5 mg/dL   Comprehensive Metabolic Panel   Result Value Ref Range    Glucose 88 65 - 99 mg/dL    BUN 16 8 - 23 mg/dL    Creatinine 1.20 (H) 0.57 - 1.00 mg/dL    Sodium 143 136 - 145 mmol/L    Potassium 4.9 3.5 - 5.2 mmol/L    Chloride 107 98 - 107 mmol/L    CO2 25.2 22.0 - 29.0 mmol/L    Calcium 8.3 (L) 8.6 - 10.5 mg/dL    Total Protein 5.6 (L) 6.0 - 8.5 g/dL    Albumin 3.30 (L) 3.50 - 5.20 g/dL    ALT (SGPT) 14 1 - 33 U/L    AST (SGOT) 29 1 - 32 U/L    Alkaline Phosphatase 68 39 - 117 U/L    Total Bilirubin 0.2 0.2 - 1.2 mg/dL    eGFR Non African Amer 44 (L) >60 mL/min/1.73    Globulin 2.3 gm/dL    A/G Ratio 1.4 g/dL    BUN/Creatinine Ratio  13.3 7.0 - 25.0    Anion Gap 10.8 5.0 - 15.0 mmol/L   Magnesium   Result Value Ref Range    Magnesium 1.7 1.6 - 2.4 mg/dL   Phosphorus   Result Value Ref Range    Phosphorus 4.0 2.5 - 4.5 mg/dL     Imaging Results (Last 72 Hours)     ** No results found for the last 72 hours. **              amLODIPine 5 mg Oral QAM   calcitriol 0.5 mcg Oral Nightly   calcium carbonate 2 tablet Oral TID   DULoxetine 20 mg Oral BID   levothyroxine 112 mcg Oral QAM   oxybutynin 5 mg Oral BID          Assessment/Plan   1. CKD 3 . Due to chronic NSAID use. Stable.  Euvolemic by exam.  2. Hyperparathyroidism. SP left inferior and right inferior gland resection with right thyroid mass resection. Path pending . Calcium has fallen from 9.2 to 8.3.  Only symptom is face itching.  This could be from narcotic. I see no rash .No twitching, no tingling.  Nose was itching pre-op, now whole face is itching.  If calcium is stable at 6 pm today, ok for dc on Calcium 1 gram TID and calcitriol 0.75 mg daily.  If calcium any lower, would keep overnight to assure stability.    3. HTN controlled on norvasc. Lower ext edema due to norvasc .  4. Hypothyroid on replacement .    If dc, patient has appt in [place] office with Dr. Levine Monday, Nov 25 at 10:30 am. I gave her an order for labs to be done on Monday am.     Dedra Giles MD  11/21/19  3:43 PM

## 2019-11-22 LAB
CYTO UR: NORMAL
LAB AP CASE REPORT: NORMAL
LAB AP CLINICAL INFORMATION: NORMAL
LAB AP DIAGNOSIS COMMENT: NORMAL
Lab: NORMAL
PATH REPORT.FINAL DX SPEC: NORMAL
PATH REPORT.GROSS SPEC: NORMAL

## 2019-11-22 NOTE — DISCHARGE INSTRUCTIONS
Appt in [place] office with Dr. Levine Monday, Nov 25 at 10:30 am.  Calcium 1 gram (2 Tums)  3 times a day and Calcitriol 0.75 mg daily.  Order for labs to be done on Monday am per Dr. Giles.

## 2019-11-22 NOTE — OP NOTE
Operative Note  Parathyroid  11/20/19        Pre-op Diagnosis:   · Hypercalcemia, vacillating, with elevated PTH.   · Scan positive for mediastinal adenoma on the left, 1.6 cm, but also possible right sided parathyroid adenoma.  · Vitamin D deficiency, on calcitriol  · Chronic kidney disease stage II/III     Post-op Diagnosis:  · Parathyroid hyperplasia, left inferior, right inferior  · Thyroid mass     Procedure:   · Parathyroid resection left and right inferior.  · Resect thyroid mass, right     Surgeon: Eleni     Assistant: OLY Pineda     Indications:  · Nice 69-year-old who comes in with bone disease, with need for ankle surgery, with hypercalcemia with elevated PTH and scan positive as above     Associated Issues:  · Depression, chronic on duloxetine, not felt attributable to hyperparathyroidism  · Hypothyroidism  · Body mass index is 43.02 kg/m².  · Recurrent ventral hernia and suspected incisional hernia, minimally symptomatic, right greater than left in size, left greater than right and symptoms  · Chronic pain on tramadol  · Anemia on iron supplement, status post gastric bypass  · Planned ankle surgery, with desire to have this surgery done first.  She would be better off to have her parathyroid function appropriate prior to orthopedic surgery as her bone will not heal as well  · Pre op calcium: 9.7  · Pre op PTH: 159  · Pre op vit D: 36     Findings:   · Intraoperative STAT PTH preoperatively 138  · Intraoperative STAT PTH post-resection of left inferior 53.7  · Intraoperative STAT PTH post-resection of left and right inferior, pending  · Gross findings:    ? Left inferior parathyroid very enlarged, rotated posteriorly, in the mediastinum, approximately 1.7 cm in length  ? Left superior parathyroid palpated and left in place  ? Left recurrent laryngeal nerve posterior, not disturbed  ? Right inferior parathyroid enlarged, approximately 1.2 cm  ? Small right superior parathyroid, noted on a stalk  and left intact though a portion of it may be included in the thyroid mass  ? Unusual thyroid mass, rotated very far posteriorly, with a nodular unusual appearance and thus resected  · Pathology frozen:         Hyperplasia on left inferior and right inferior parathyroid     Recommendations:   · Begin calcium 1000 mg bid and continue calcitriol 0.50 micrograms daily.     EBL: <50 ml     Technique:      General anesthetic was induced.  IV Kefzol was given.  The neck was extended, and then prepped with Hibiclens and draped sterilely.     The neck was examined and the skin creases evaluated to determine the best location for the incision, attempting to maximize both operative exposure and post op cosmesis.  I selected a natural skin crease, and marked.       1/2% Marcaine with epinephrine was used to inject the site.  Incision was made thru the skin and subcutaneous space and then completed with cautery to the cervical fascia.  Flaps were then raised with the facelift retractors and cautery, directly on the anterior aspect of the fascia, both superiorly and inferiorly.  The strap muscles were then divided in the midline.     Dissection was then begun under the strap muscle on the left side.  The bipolar on 15 was used.  The dissection was fairly straightforward, but she had a somewhat asymmetric neck, with the carotid coming up dramatically on the patient's right.  Dissection under the clavicle on the left was fairly straightforward, and a fairly quickly could feel the mass, lifted that up, and began to dissect around it.     Prior to resecting it however I do not want to make sure identified the nerve, and had to take down the middle thyroid vein in order to do so.  With this I resected the inferior parathyroid with the bipolars and sent to pathology.     I then began to search for the superior.  I felt like I really could feel it deep to some fatty tissue and thus opted not to disturb that any further, and hopes to  keep it in excellent operative state.     I then went to the right side, dissected inferiorly, and found what appeared to be another parathyroid that was mildly enlarged not dramatically.  I thus similarly took down the middle thyroid vein on that side and began to look more closely.  There was a very large mass that extended down into the mediastinum on the right, which ultimately I was able to dissect free, lift up, and ultimately thought this probably was just a thyroid nodule, it had an unusual appearance however in this I decided to go ahead and resect that.  I did find a very small parathyroid attached to that the superior aspect, pulled that back to the left that it placed.     With this I decided to go ahead and resect the inferior parathyroid in toto, and did so with the bipolar.  I placed clips across the thyroid and then divided that with the bipolar, taking out the thyroid mass that was about 2 cm in length.  I then dissected very far posteriorly to identify the nerve with certainty, adjacent to the trachea.     I then ensured hemostasis on both sides.  Surgicel was placed.     The wound was then closed with a running 3-0 Vicryl to the midline strap muscles, followed by interrupted 3-0 Vicryl to the platysma, interrupted 3-0 Vicryl to the subcutaneous, and running 5-0 Vicryl to the skin.  Dermabond was applied to the wound.     Gianna Knowles MD  11/20/19  4:49 PM

## 2019-11-22 NOTE — NURSING NOTE
Discharge orders ready but no pain medication script on chart. Per , if OK with pt, go home without script and call office for script in AM. Pt agree and will call office in AM.

## 2019-11-25 ENCOUNTER — LAB (OUTPATIENT)
Dept: LAB | Facility: HOSPITAL | Age: 69
End: 2019-11-25

## 2019-11-25 ENCOUNTER — TRANSCRIBE ORDERS (OUTPATIENT)
Dept: ADMINISTRATIVE | Facility: HOSPITAL | Age: 69
End: 2019-11-25

## 2019-11-25 DIAGNOSIS — N18.30 CHRONIC KIDNEY DISEASE, STAGE III (MODERATE) (HCC): Primary | ICD-10-CM

## 2019-11-25 DIAGNOSIS — E21.3 HYPERPARATHYROIDISM (HCC): ICD-10-CM

## 2019-11-25 DIAGNOSIS — E83.51 HYPOCALCEMIA: ICD-10-CM

## 2019-11-25 DIAGNOSIS — N18.30 CHRONIC KIDNEY DISEASE, STAGE III (MODERATE) (HCC): ICD-10-CM

## 2019-11-25 LAB
ANION GAP SERPL CALCULATED.3IONS-SCNC: 11.4 MMOL/L (ref 5–15)
BUN BLD-MCNC: 16 MG/DL (ref 8–23)
BUN/CREAT SERPL: 15 (ref 7–25)
CA-I BLD-MCNC: 3.93 MG/DL (ref 4.6–5.4)
CALCIUM SPEC-SCNC: 7.8 MG/DL (ref 8.6–10.5)
CHLORIDE SERPL-SCNC: 105 MMOL/L (ref 98–107)
CO2 SERPL-SCNC: 28.6 MMOL/L (ref 22–29)
CREAT BLD-MCNC: 1.07 MG/DL (ref 0.57–1)
GFR SERPL CREATININE-BSD FRML MDRD: 51 ML/MIN/1.73
GLUCOSE BLD-MCNC: 108 MG/DL (ref 65–99)
PHOSPHATE SERPL-MCNC: 3.7 MG/DL (ref 2.5–4.5)
POTASSIUM BLD-SCNC: 4 MMOL/L (ref 3.5–5.2)
PTH-INTACT SERPL-MCNC: 56.9 PG/ML (ref 15–65)
SODIUM BLD-SCNC: 145 MMOL/L (ref 136–145)

## 2019-11-25 PROCEDURE — 84100 ASSAY OF PHOSPHORUS: CPT

## 2019-11-25 PROCEDURE — 83970 ASSAY OF PARATHORMONE: CPT

## 2019-11-25 PROCEDURE — 80048 BASIC METABOLIC PNL TOTAL CA: CPT

## 2019-11-25 PROCEDURE — 36415 COLL VENOUS BLD VENIPUNCTURE: CPT

## 2019-11-25 PROCEDURE — 82330 ASSAY OF CALCIUM: CPT | Performed by: INTERNAL MEDICINE

## 2019-12-01 NOTE — PROGRESS NOTES
SURGERY: [facility]  Post op Visit  Norma Ro  12/02/19    Ms. Ro  presents today after having undergone Surgery 11/20/2019, of a parathyroid resection, left and right inferior, with thyroid nodule resection.  This was done for hypercalcemia with elevated PTH, with scan positive for mediastinal adenoma on the left, but also possible right sided parathyroid adenoma.    Her intraoperative stat PTH preoperatively was 138, postoperatively 22.  Her intact PTH went from 1 59-56.9.  Despite being on what she tells me her 12 times a day her calcium last week was 7.8.  This week her ionized calcium was normal.  She is also on calcitriol 1 mcg daily.  This seems like an unusual amount of supplementation that would be needed for a normal PTH, but perhaps she is just having that much bone absorption.    Her incision looks great.  I agree with Dr. Levine that she would be best served by following up with Dr. Ortiz for her supplementation of her calcium and calcitriol, with a check of her TSH perhaps in the future as well.  We discussed her return to work.    I suggested she wait at least 3 months to have her foot surgery, and tell her bone metabolism has settled down.  After she gets her foot healed, she can return and we will addressed her large abdominal hernias.         Gianna Knowles MD  5:31 PM

## 2019-12-02 ENCOUNTER — LAB (OUTPATIENT)
Dept: LAB | Facility: HOSPITAL | Age: 69
End: 2019-12-02

## 2019-12-02 ENCOUNTER — OFFICE VISIT (OUTPATIENT)
Dept: SURGERY | Facility: CLINIC | Age: 69
End: 2019-12-02

## 2019-12-02 VITALS — WEIGHT: 244 LBS | OXYGEN SATURATION: 98 % | BODY MASS INDEX: 44.9 KG/M2 | HEART RATE: 67 BPM | HEIGHT: 62 IN

## 2019-12-02 DIAGNOSIS — E83.52 HYPERCALCEMIA: Primary | ICD-10-CM

## 2019-12-02 DIAGNOSIS — E21.3 HYPERPARATHYROIDISM (HCC): ICD-10-CM

## 2019-12-02 LAB
CA-I BLD-MCNC: 4.6 MG/DL (ref 4.6–5.4)
CA-I SERPL ISE-MCNC: 1.15 MMOL/L (ref 1.15–1.35)
CALCIUM SPEC-SCNC: 8.3 MG/DL (ref 8.6–10.5)
PTH-INTACT SERPL-MCNC: 100 PG/ML (ref 15–65)

## 2019-12-02 PROCEDURE — 99024 POSTOP FOLLOW-UP VISIT: CPT | Performed by: SURGERY

## 2019-12-02 PROCEDURE — 82330 ASSAY OF CALCIUM: CPT | Performed by: INTERNAL MEDICINE

## 2019-12-02 PROCEDURE — 83970 ASSAY OF PARATHORMONE: CPT

## 2019-12-02 PROCEDURE — 36415 COLL VENOUS BLD VENIPUNCTURE: CPT

## 2019-12-02 PROCEDURE — 82310 ASSAY OF CALCIUM: CPT

## 2019-12-02 RX ORDER — CALCITRIOL 1 UG/ML
SOLUTION ORAL
COMMUNITY
Start: 2019-11-27 | End: 2020-08-31 | Stop reason: DRUGHIGH

## 2019-12-03 NOTE — PROGRESS NOTES
Discussed with Dr Mazin Ortiz and the patient.  I am befuddled about her PTH increase and can only conjecture that it has something to do with her CKD.  Dr Ortiz has stated that he will take over now.  i've communicated with her.

## 2019-12-11 ENCOUNTER — APPOINTMENT (OUTPATIENT)
Dept: LAB | Facility: HOSPITAL | Age: 69
End: 2019-12-11

## 2020-08-28 PROBLEM — K43.2 INCISIONAL HERNIA, WITHOUT OBSTRUCTION OR GANGRENE: Status: ACTIVE | Noted: 2020-08-28

## 2020-08-31 ENCOUNTER — OFFICE VISIT (OUTPATIENT)
Dept: SURGERY | Facility: CLINIC | Age: 70
End: 2020-08-31

## 2020-08-31 VITALS — WEIGHT: 239 LBS | BODY MASS INDEX: 43.98 KG/M2 | HEIGHT: 62 IN

## 2020-08-31 DIAGNOSIS — K43.2 INCISIONAL HERNIA, WITHOUT OBSTRUCTION OR GANGRENE: Primary | ICD-10-CM

## 2020-08-31 PROCEDURE — 99214 OFFICE O/P EST MOD 30 MIN: CPT | Performed by: SURGERY

## 2020-08-31 RX ORDER — GLUCOSAMINE HCL 500 MG
3000 TABLET ORAL DAILY
COMMUNITY

## 2020-08-31 RX ORDER — CALCITRIOL 0.5 UG/1
0.5 CAPSULE, LIQUID FILLED ORAL DAILY
COMMUNITY

## 2020-08-31 RX ORDER — FOLIC ACID 1 MG/1
1 TABLET ORAL DAILY
COMMUNITY
End: 2020-12-29

## 2020-08-31 RX ORDER — PRAMIPEXOLE DIHYDROCHLORIDE 0.12 MG/1
0.12 TABLET ORAL TAKE AS DIRECTED
COMMUNITY
End: 2020-12-29 | Stop reason: ALTCHOICE

## 2020-11-11 ENCOUNTER — OFFICE VISIT (OUTPATIENT)
Dept: SURGERY | Facility: CLINIC | Age: 70
End: 2020-11-11

## 2020-11-11 VITALS — HEIGHT: 62 IN | BODY MASS INDEX: 41.62 KG/M2 | WEIGHT: 226.2 LBS

## 2020-11-11 DIAGNOSIS — E66.01 MORBID OBESITY WITH BMI OF 40.0-44.9, ADULT (HCC): ICD-10-CM

## 2020-11-11 DIAGNOSIS — E21.3 HYPERPARATHYROIDISM (HCC): ICD-10-CM

## 2020-11-11 DIAGNOSIS — K43.2 RECURRENT INCISIONAL HERNIA: Primary | ICD-10-CM

## 2020-11-11 PROCEDURE — 99215 OFFICE O/P EST HI 40 MIN: CPT | Performed by: SURGERY

## 2020-11-11 RX ORDER — LISINOPRIL 10 MG/1
TABLET ORAL
COMMUNITY
Start: 2020-10-29 | End: 2020-12-29

## 2020-11-11 NOTE — PROGRESS NOTES
SURGERY  Norma Ro   1950 11/11/20    Chief Complaint:  Complex multiple ventral hernias    HPI    Patient is a nice 70 y.o. female who presents with her ventral hernias, noted last year when she came in with hyperparathyroidism.  At that time, i felt like she had a hernia which might need addressing.  She has had this for some time, and it was evaluated at [facility] in [place], and no pursuit of repair felt advisable..      Now, she has undergone a parathyroidectomy and has done well.  She wants to have her hernia addressed.  This all began after a gastric bypass which was initially successful with weight loss then recidivism.  She had a laparoscopic repair at [facility] in 2014 which I was able to obtain the notes from and reviewed.  There was entry into the abdomen under direct vision in the lower abdomen, adhesiolysis for one hours and then 2 separate pieces of mesh, a 20 cm seprafilm and an 8 cm goretex with greater than 50 tacs.  (Permasorb)       At her last visit, I talked about the significant risk surgery would entail and we discussed the fact that I would not operate on her with a BMI of 44.  She was referred to [facility] for surgery and happily she is already lost 18 to 20 pounds without even doing the exercise portion.  She believes that she can lose more and wants to do so.  Her symptoms have actually abated somewhat since she has had the weight loss.  She still feels like she has a lot of discomfort from the left lateral protruding area.      She has chronic kidney disease stage III, with Vitamin D deficiency,     She has a family history of Factor V so she was checked and was negative.  No family history of parathyroid problems, but her mother was hypothyroid.    Past Medical History:   Diagnosis Date   • Agatston CAC score, <100     2017: 47   • Anemia    • Arthritis    • Arthritis    • Broken bones    • Chronic kidney disease    • Depression    • Depression    • False  positive stress test     2017 -- coronary artery CTA showed no significant CAD   • GERD (gastroesophageal reflux disease)    • Headache     occ   • Hearing loss     asim hearing aids   • History of blood clots    • History of cardiovascular stress test     normal Cardiolite 9/2016   • Hypertension    • Hyperthyroidism    • Hypoglycemia     Rebound hypoglycemia   • Hypothyroid    • Hypothyroidism    • Incisional hernia     s/p surgical repair, 2014   • Incontinence in female    • Lower back pain    • Morbid obesity (CMS/HCC)     s/p remote gastric bypass   • Sinus bradycardia    • UTI (urinary tract infection)    • Varicose veins      Past Surgical History:   Procedure Laterality Date   • ANKLE ARTHRODESIS Right 01/23/2019    Right open ankle arthrodesis, harvest of proximal tibial bone graft as major graft, exostectomy, medial great toe-Dr. Santy Romero   • CARPAL TUNNEL RELEASE Left    • CHOLECYSTECTOMY     • COLONOSCOPY N/A 11/3/2017    Procedure: COLONOSCOPY; polypectomy;  Surgeon: Chevy Zuniga MD;  [facility];  Service:    • COLONOSCOPY N/A 03/25/2007    Normal-Dr. Timoteo Prado   • EXTERNAL FIXATOR APPLICATION     • EYE SURGERY  08/26/2020    Dr. Avalos.   • GALLBLADDER SURGERY      1978?   • GASTRIC BYPASS      1980s   • HERNIA REPAIR  2014   • ORIF TIBIA/FIBULA FRACTURES Right 2005   • PARATHYROIDECTOMY N/A 11/20/2019    Procedure: left inferior parathyroid mediastinal resection and right inferior parathyroid resection, thyroid mass resection;  Surgeon: Gianna Knowles MD;  Location: [facility] OR;  Service: General   • UPPER GASTROINTESTINAL ENDOSCOPY N/A 03/25/2007    No gross lesions in the esophagus, non-bleeding erythematous gastropathy, antrectomy with a small gastric pouch remnant, Billroth II anatomy with a gastrojejunostomy-Dr. Timoteo Prado     Family History   Problem Relation Age of Onset   • Aortic aneurysm Father         abdominal   • Heart disease Father    •  Hypertension Brother    • Stroke Brother    • Intracerebral hemorrhage Brother    • Heart disease Brother    • Heart disease Maternal Grandfather    • Stroke Paternal Grandmother    • Heart disease Paternal Grandfather    • Diabetes Mother    • Heart disease Mother    • Malig Hyperthermia Neg Hx      Social History     Socioeconomic History   • Marital status:      Spouse name: Not on file   • Number of children: Not on file   • Years of education: Not on file   • Highest education level: Not on file   Occupational History   • Occupation: RETIRED   • Occupation: PART-TIME/ [employer]    Tobacco Use   • Smoking status: Never Smoker   • Smokeless tobacco: Never Used   • Tobacco comment: CAFFEINE USE: MOSTLY CAFFEINE FREE.    Substance and Sexual Activity   • Alcohol use: No     Frequency: Never     Comment: very seldom   • Drug use: No   • Sexual activity: Defer     Comment: .         Current Outpatient Medications:   •  acetaminophen (TYLENOL) 500 MG tablet, Take 500 mg by mouth Every 6 (Six) Hours As Needed for Mild Pain ., Disp: , Rfl:   •  B Complex Vitamins (VITAMIN B COMPLEX) capsule capsule, Take 1 capsule by mouth Every Night., Disp: , Rfl:   •  calcitriol (ROCALTROL) 0.5 MCG capsule, Take 0.5 mcg by mouth Daily., Disp: , Rfl:   •  Cholecalciferol (VITAMIN D3) 75 MCG (3000 UT) tablet, Take 3,000 Units by mouth Daily., Disp: , Rfl:   •  cyclobenzaprine (FLEXERIL) 5 MG tablet, Take 5 mg by mouth As Needed., Disp: , Rfl:   •  diphenhydrAMINE (BENADRYL) 25 mg capsule, Take 25 mg by mouth every 6 (six) hours as needed for itching., Disp: , Rfl:   •  DULoxetine (CYMBALTA) 20 MG capsule, Take 20 mg by mouth 2 (Two) Times a Day., Disp: , Rfl:   •  famotidine (PEPCID) 20 MG tablet, Take 20 mg by mouth Daily., Disp: , Rfl:   •  ferrous sulfate (IRON SUPPLEMENT) 325 (65 FE) MG tablet, Take 325 mg by mouth Every Other Day., Disp: , Rfl:   •  folic acid (FOLVITE) 1 MG tablet, Take 1 mg by mouth Daily., Disp: ,  Rfl:   •  gabapentin (NEURONTIN) 100 MG capsule, Take 100 mg by mouth As Needed., Disp: , Rfl:   •  levothyroxine (SYNTHROID, LEVOTHROID) 112 MCG tablet, Take 112 mcg by mouth Every Morning., Disp: , Rfl:   •  lisinopril (PRINIVIL,ZESTRIL) 10 MG tablet, , Disp: , Rfl:   •  Multiple Vitamins-Minerals (ONE-A-DAY PROACTIVE 65+) tablet, Take 1 tablet by mouth Every Night., Disp: , Rfl:   •  nystatin (MYCOSTATIN) 055527 UNIT/GM powder, Apply 100,000 application topically to the appropriate area as directed As Needed., Disp: , Rfl:   •  oxybutynin (DITROPAN) 5 MG tablet, Take 5 mg by mouth 2 (Two) Times a Day., Disp: , Rfl:   •  pramipexole (MIRAPEX) 0.125 MG tablet, Take 0.125 mg by mouth Take As Directed., Disp: , Rfl:   •  Red Yeast Rice Extract 600 MG capsule, Take 600 mg by mouth 2 (Two) Times a Day., Disp: , Rfl:   •  traMADol (ULTRAM) 50 MG tablet, Take 50 mg by mouth Every 6 (Six) Hours As Needed., Disp: , Rfl:   •  TURMERIC PO, Take 1 tablet by mouth Every Night., Disp: , Rfl:   •  amLODIPine (NORVASC) 5 MG tablet, Take 5 mg by mouth Every Morning., Disp: , Rfl:     Allergies   Allergen Reactions   • Baclofen Dizziness and Arrhythmia   • Morphine Itching and Other (See Comments)     Chest tightness, Abdominal Pain   • Nsaids Other (See Comments)     DUE TO CKD STAGE III   • Flagyl [Metronidazole] Other (See Comments)     headaches   • Lodine [Etodolac] Hives     Blotches under the skin   • Macrodantin [Nitrofurantoin] Other (See Comments)     headaches   • Other Rash     LODINE  BLOTCHES ON SKIN        • Tetracyclines & Related Hives and Rash     Review of Systems   HENT: Positive for tinnitus.    Gastrointestinal: Positive for abdominal pain, diarrhea and nausea.   Musculoskeletal: Positive for arthralgias, back pain and bursitis.   Neurological: Positive for weakness, light-headedness and headache.   Psychiatric/Behavioral: Positive for sleep disturbance and depressed mood.   All other systems reviewed and are  "negative.      Vitals:    11/11/20 1426   Weight: 103 kg (226 lb 3.2 oz)   Height: 157.5 cm (62.01\")       PHYSICAL EXAM:    Ht 157.5 cm (62.01\")   Wt 103 kg (226 lb 3.2 oz)   BMI 41.36 kg/m²   Body mass index is 41.36 kg/m².    Constitutional: well developed, well nourished, appears moderately healthy, stated age, increased oral motion  Eyes: sclera nonicteric, conjunctiva not injected   ENMT: Hearing intact, trachea midline, thyroid without masses  CVS: RRR, no murmur, peripheral edema 1+, varicosities on left leg  Respiratory: CTA, normal respiratory effort   Gastrointestinal: no hepatosplenomegaly, abdomen obese, with pannus, abdominal hernia suspected in the left and right aspect of her chevron incisional scar.  That on the left with a billowy character, and today perhaps even more consistent with simply thinning abdominal wall that on the right with nodules that appear to be incarcerated, with some in the midline as well that are present, with Swiss cheese effect.  The incisional scar is at least 3 cm below the area of the fascial defect, illustrating the amount of ptosis of the skin that she has.  She has a large pannus and is wondering about having that removed as well  Genitourinary: inguinal hernia not detected  Musculoskeletal: gait slowed and swinging, muscle mass normal incisional scar on lower anterior, ankle from recent surgery  Skin: warm and dry, no rashes visible  Neurological: awake and alert, seems to have reasonable capacity for understanding for medical decision making  Psychiatric: appears to have reasonable judgement, pleasant  Lymphatics: no cervical adenopathy    Radiographic Findings: CT scan from [place] daughter with apparent suggestion as to a spigellian hernia as well as recurrent ventral hernia.  That CT was years ago    IMPRESSION:  · Multiple incisional hernias, possibly recurrent with hernia repair elsewhere with 20 cm Seprafilm, 8 cm [place]-Jayme .    · Hyperparathyroidism status post " mediastinal adenoma on the left resection with resolution, but now with recurrent elevated PTH.  I was concerned prior to that resection that she might in fact have FHH, and this recurrence makes me more concerned now.  · Vitamin D deficiency, on calcitriol  · Chronic kidney disease stage II/III  · Depression, chronic on duloxetine, not felt attributable to hyperparathyroidism  · Hypothyroidism  Body mass index is 41.36 kg/m².  · Chronic pain on tramadol  · Anemia on iron supplement, status post gastric bypass    PLAN:  · Continue weight loss.  This is critical for her to complete this in order to have a procedure that we have any chance of healing, avoidance of infection, or postoperative complication.  She understands that.    · Discussed with patient increased perioperative risks associated with obesity including increased risks of DVT, infection, seromas, poor wound healing and hernias (with abdominal surgery).  · Recheck on parathyroid work-up, with her most recent PTH being in the 1 teens.  Ordered a serum phosphorus, calcium, intact PTH, magnesium, creatinine and 24-hour urine for creatinine and calcium.  · CT scan in 2 months which will be probably about a month prior to when we will plan to proceed with her surgery.    Gianna Knowles MD  11/11/20    Greater than 40 minutes spent with over half in counseling face-to-face

## 2020-11-24 ENCOUNTER — TELEPHONE (OUTPATIENT)
Dept: SURGERY | Facility: CLINIC | Age: 70
End: 2020-11-24

## 2020-11-24 NOTE — TELEPHONE ENCOUNTER
Patient called back and reported that she plans to have her labs done around the time of her CT scan (to be done in January). Extended the results to the end of Jan 2021.

## 2020-11-24 NOTE — TELEPHONE ENCOUNTER
LVM for patient inquiring when she plans to have her labs drawn that were ordered by Dr. Knowles on 11/11/20  (calcium, PTH, phos, mag, etc) or if she plans to have these done at an outside facility. Gave her my direct ext to call back.

## 2020-12-24 NOTE — PROGRESS NOTES
RM:________     PCP: Caity King APRN    : 1950  AGE: 70 y.o.  EST PATIENT   REASON FOR VISIT/  CC:    BP Readings from Last 3 Encounters:   19 133/73   19 146/80   19 135/88        WT: ____________ BP: __________L __________R HR______    CHEST PAIN: _____________    SOA: _____________PALPS: _______________     LIGHTHEADED: ___________FATIGUE: ________________ EDEMA __________    ALLERGIES:Baclofen, Morphine, Nsaids, Flagyl [metronidazole], Lodine [etodolac], Macrodantin [nitrofurantoin], Other, and Tetracyclines & related SMOKING HISTORY:  Social History     Tobacco Use   • Smoking status: Never Smoker   • Smokeless tobacco: Never Used   • Tobacco comment: CAFFEINE USE: MOSTLY CAFFEINE FREE.    Substance Use Topics   • Alcohol use: No     Frequency: Never     Comment: very seldom   • Drug use: No     CAFFEINE USE_________________  ALCOHOL ______________________    Below is the patient's most recent value for Albumin, ALT, AST, BUN, Calcium, Chloride, Cholesterol, CO2, Creatinine, GFR, Glucose, HDL, Hematocrit, Hemoglobin, Hemoglobin A1C, LDL, Magnesium, Phosphorus, Platelets, Potassium, PSA, Sodium, Triglycerides, TSH and WBC.   Lab Results   Component Value Date    ALBUMIN 4.2 2020    ALT 14 2019    AST 29 2019    BUN 29 (H) 2020    CALCIUM 9.1 2020     2020    CO2 31 (H) 2020    CREATININE 1.5 2020    GLU 88 2020    HCT 40.3 2020    HGB 12.6 2020    MG 1.7 2019    PHOS 3.7 2019     2020    K 4.6 2020     2020    TSH 11.290 (H) 2017    WBC 7.77 2020          NEW DIAGNOSIS/ SURGERY/ HOSP OR ED VISITS: ______________________    __________________________________________________________________      RECENT LABS OR DIAGNOSTIC TESTING:  _____________________________    __________________________________________________________________      ASSESSMENT/ PLAN:  _______________________________________________    __________________________________________________________________

## 2020-12-29 ENCOUNTER — OFFICE VISIT (OUTPATIENT)
Dept: CARDIOLOGY | Facility: CLINIC | Age: 70
End: 2020-12-29

## 2020-12-29 VITALS
WEIGHT: 222 LBS | SYSTOLIC BLOOD PRESSURE: 100 MMHG | BODY MASS INDEX: 40.85 KG/M2 | HEIGHT: 62 IN | HEART RATE: 57 BPM | DIASTOLIC BLOOD PRESSURE: 62 MMHG

## 2020-12-29 DIAGNOSIS — I10 ESSENTIAL HYPERTENSION: Primary | ICD-10-CM

## 2020-12-29 DIAGNOSIS — R00.1 SINUS BRADYCARDIA: ICD-10-CM

## 2020-12-29 DIAGNOSIS — R93.1 AGATSTON CAC SCORE, <100: ICD-10-CM

## 2020-12-29 DIAGNOSIS — Z78.9 FALSE POSITIVE STRESS TEST: ICD-10-CM

## 2020-12-29 PROCEDURE — 93000 ELECTROCARDIOGRAM COMPLETE: CPT | Performed by: INTERNAL MEDICINE

## 2020-12-29 PROCEDURE — 99213 OFFICE O/P EST LOW 20 MIN: CPT | Performed by: INTERNAL MEDICINE

## 2020-12-29 RX ORDER — LISINOPRIL 20 MG/1
20 TABLET ORAL DAILY
COMMUNITY
End: 2021-09-20 | Stop reason: ALTCHOICE

## 2020-12-29 NOTE — PROGRESS NOTES
Date of Office Visit: 2020  Encounter Provider: Mir Martinez MD  Place of Service: [facility] CARDIOLOGY  Patient Name: Norma Ro  :1950    Chief Complaint   Patient presents with   • Slow Heart Rate   :     HPI: Norma Ro is a 70 y.o. female who presents today for follow up. I have reviewed prior notes and there are no changes except for any new updates described below. I have also reviewed any information entered into the medical record by the patient or by ancillary staff.     She initially saw Dr. Adhikari in 2014 for preoperative risk assessment prior to hernia repair; he recommended no testing as she was aysmptomatic. Her pulse was 66 bpm at that time.     I met her in 2016 for the evaluation of sinus bradycardia.  She has hypothyroidism, and in 2016 she was hyperthyroid on her levothyroxine. The dose was decreased and her TSH jumped to 21 in mid 2016. Around that time, she also became concerned about her heart rate; a Holter monitor showed an average pulse of 58 bpm, with a range of 38-94. There were extremely rare ectopics, and no pauses.      She was then started on lisinopril for hypertension in 2016.  She has CKD and follows with Dr. Levine for this.     In 2017, she noted dyspnea and atypical chest pain.  A Cardiolite indicated anterior ischemia.  She wanted to avoid coronary angiography so coronary CTA was recommended.  Unfortunately, she was given oral metoprolol prior to the study per radiology protocol and became quite bradycardic and required overnight observation.  Her CTA showed no occlusive CAD; her CACS was 47.    She has chronic mild edema but has large varicose veins (and is morbidly obese). She was seen in the vein care center in 2016 and she declined a venous doppler (to check for reflux).  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  Compression hose were recommended but she couldn't wear them  due to leg size.    She has had two surgeries since I saw her last; there were no cardiac issues perioperatively.  She has chronic fatigue but denies chest pain or dyspnea.     Past Medical History:   Diagnosis Date   • Agatston CAC score, <100     2017: 47   • Anemia    • Broken bones    • Chronic kidney disease    • Depression    • Depression    • False positive stress test     2017 -- coronary artery CTA showed no significant CAD   • GERD (gastroesophageal reflux disease)    • Headache     occ   • Hearing loss     asim hearing aids   • History of blood clots    • History of cardiovascular stress test     normal Cardiolite 9/2016   • Hypertension    • Hyperthyroidism    • Hypoglycemia     Rebound hypoglycemia   • Hypothyroid    • Hypothyroidism    • Incisional hernia     s/p surgical repair, 2014   • Incontinence in female    • Lower back pain    • Morbid obesity (CMS/HCC)     s/p remote gastric bypass   • Osteoarthritis    • Sinus bradycardia    • UTI (urinary tract infection)    • Varicose veins        Past Surgical History:   Procedure Laterality Date   • ANKLE ARTHRODESIS Right 01/23/2019    Right open ankle arthrodesis, harvest of proximal tibial bone graft as major graft, exostectomy, medial great toe-Dr. Santy Romero   • CARPAL TUNNEL RELEASE Left    • CHOLECYSTECTOMY     • COLONOSCOPY N/A 11/3/2017    Procedure: COLONOSCOPY; polypectomy;  Surgeon: Chevy Zuniga MD;  Location: [facility] OR;  Service:    • COLONOSCOPY N/A 03/25/2007    Normal-Dr. Timoteo Prado   • EXTERNAL FIXATOR APPLICATION     • EYE SURGERY  08/26/2020    Dr. Avalos.   • GALLBLADDER SURGERY      1978?   • GASTRIC BYPASS      1980s   • HERNIA REPAIR  2014   • ORIF TIBIA/FIBULA FRACTURES Right 2005   • PARATHYROIDECTOMY N/A 11/20/2019    Procedure: left inferior parathyroid mediastinal resection and right inferior parathyroid resection, thyroid mass resection;  Surgeon: Gianna Knowles MD;  Location: [facility] OR;  Service:  General   • UPPER GASTROINTESTINAL ENDOSCOPY N/A 03/25/2007    No gross lesions in the esophagus, non-bleeding erythematous gastropathy, antrectomy with a small gastric pouch remnant, Billroth II anatomy with a gastrojejunostomy-Dr. Timoteo Prado       Social History     Socioeconomic History   • Marital status:      Spouse name: Not on file   • Number of children: Not on file   • Years of education: Not on file   • Highest education level: Not on file   Occupational History   • Occupation: RETIRED   • Occupation: PART-TIME/ [employer]    Tobacco Use   • Smoking status: Never Smoker   • Smokeless tobacco: Never Used   • Tobacco comment: CAFFEINE USE: MOSTLY CAFFEINE FREE.    Substance and Sexual Activity   • Alcohol use: No     Frequency: Never     Comment: very seldom   • Drug use: No   • Sexual activity: Defer     Comment: .       Family History   Problem Relation Age of Onset   • Aortic aneurysm Father         abdominal   • Heart disease Father    • Hypertension Brother    • Stroke Brother    • Intracerebral hemorrhage Brother    • Heart disease Brother    • Heart disease Maternal Grandfather    • Stroke Paternal Grandmother    • Heart disease Paternal Grandfather    • Diabetes Mother    • Heart disease Mother    • Malig Hyperthermia Neg Hx        Review of Systems   Constitution: Positive for malaise/fatigue.   Cardiovascular: Positive for leg swelling. Negative for chest pain.   Respiratory: Negative for shortness of breath.    Musculoskeletal: Positive for joint pain and myalgias. Negative for back pain.   Neurological: Positive for excessive daytime sleepiness.   Psychiatric/Behavioral: Positive for depression.   All other systems reviewed and are negative.      Allergies   Allergen Reactions   • Baclofen Dizziness and Arrhythmia   • Morphine Itching and Other (See Comments)     Chest tightness, Abdominal Pain   • Nsaids Other (See Comments)     DUE TO CKD STAGE III   • Flagyl [Metronidazole] Other  (See Comments)     headaches   • Lodine [Etodolac] Hives     Blotches under the skin   • Macrodantin [Nitrofurantoin] Other (See Comments)     headaches   • Other Rash     LODINE  BLOTCHES ON SKIN        • Tetracyclines & Related Hives and Rash         Current Outpatient Medications:   •  acetaminophen (TYLENOL) 500 MG tablet, Take 500 mg by mouth Every 6 (Six) Hours As Needed for Mild Pain ., Disp: , Rfl:   •  B Complex Vitamins (VITAMIN B COMPLEX) capsule capsule, Take 1 capsule by mouth Every Night., Disp: , Rfl:   •  calcitriol (ROCALTROL) 0.5 MCG capsule, Take 0.5 mcg by mouth Daily., Disp: , Rfl:   •  Cholecalciferol (VITAMIN D3) 75 MCG (3000 UT) tablet, Take 3,000 Units by mouth Daily., Disp: , Rfl:   •  cyclobenzaprine (FLEXERIL) 5 MG tablet, Take 5 mg by mouth As Needed., Disp: , Rfl:   •  diphenhydrAMINE (BENADRYL) 25 mg capsule, Take 25 mg by mouth every 6 (six) hours as needed for itching., Disp: , Rfl:   •  DULoxetine (CYMBALTA) 20 MG capsule, Take 20 mg by mouth 2 (Two) Times a Day., Disp: , Rfl:   •  famotidine (PEPCID) 20 MG tablet, Take 20 mg by mouth Daily., Disp: , Rfl:   •  ferrous sulfate (IRON SUPPLEMENT) 325 (65 FE) MG tablet, Take 325 mg by mouth Every Other Day., Disp: , Rfl:   •  gabapentin (NEURONTIN) 100 MG capsule, Take 300 mg by mouth 3 (Three) Times a Day., Disp: , Rfl:   •  levothyroxine (SYNTHROID, LEVOTHROID) 112 MCG tablet, Take 112 mcg by mouth Every Morning., Disp: , Rfl:   •  lisinopril (PRINIVIL,ZESTRIL) 20 MG tablet, Take 20 mg by mouth Daily., Disp: , Rfl:   •  Multiple Vitamins-Minerals (ONE-A-DAY PROACTIVE 65+) tablet, Take 1 tablet by mouth Every Night., Disp: , Rfl:   •  oxybutynin (DITROPAN) 5 MG tablet, Take 5 mg by mouth 2 (Two) Times a Day., Disp: , Rfl:   •  Red Yeast Rice Extract 600 MG capsule, Take 600 mg by mouth 2 (Two) Times a Day., Disp: , Rfl:   •  traMADol (ULTRAM) 50 MG tablet, Take 50 mg by mouth Every 6 (Six) Hours As Needed., Disp: , Rfl:   •  TURMERIC  "PO, Take 1 tablet by mouth Every Night., Disp: , Rfl:      Objective:     Vitals:    12/29/20 1347   BP: 100/62   BP Location: Left arm   Pulse: 57   Weight: 101 kg (222 lb)   Height: 157.5 cm (62.01\")     Body mass index is 40.59 kg/m².    Physical Exam   Constitutional: She is oriented to person, place, and time.   Obese   HENT:   Head: Normocephalic.   Nose: Nose normal.   Masked   Eyes: Conjunctivae and EOM are normal.   Neck: Normal range of motion.   Cannot assess for JVD due to habitus   Cardiovascular: Normal rate, regular rhythm, normal heart sounds and intact distal pulses.   No murmur heard.  Pulmonary/Chest: Effort normal and breath sounds normal.   Abdominal: Soft. There is no abdominal tenderness.   Obesity limits abdominal exam   Musculoskeletal: Normal range of motion.         General: Edema (lipedema, no pitting) present.   Neurological: She is alert and oriented to person, place, and time. No cranial nerve deficit.   Skin: Skin is warm and dry. No rash noted.   Psychiatric: She has a normal mood and affect. Her behavior is normal. Judgment and thought content normal.   Vitals reviewed.        ECG 12 Lead    Date/Time: 12/29/2020 2:03 PM  Performed by: Mir Martinez MD  Authorized by: Mir Martinez MD   Comparison: compared with previous ECG   Similar to previous ECG  Rhythm: sinus rhythm  Conduction: conduction normal  ST Segments: ST segments normal  T Waves: T waves normal  QRS axis: normal  Other: no other findings    Clinical impression: normal ECG            Assessment:       Diagnosis Plan   1. Essential hypertension     2. Sinus bradycardia     3. False positive stress test     4. Agatston CAC score, <100            Plan:     She is a history of relatively mild sinus bradycardia which is asymptomatic.  She does not require a pacemaker.  She has good heart rate variability by previous monitoring studies.  She has hypertension which is well controlled.  She has a history of a false positive " stress test without any significant coronary disease by a CT angiogram in 2017.    Ideally she would be on a statin to decrease her risk but she declines.    Sincerely,       Mir Martinez MD

## 2021-01-06 ENCOUNTER — APPOINTMENT (OUTPATIENT)
Dept: CT IMAGING | Facility: HOSPITAL | Age: 71
End: 2021-01-06

## 2021-01-14 ENCOUNTER — LAB (OUTPATIENT)
Dept: LAB | Facility: HOSPITAL | Age: 71
End: 2021-01-14

## 2021-01-14 DIAGNOSIS — E21.3 HYPERPARATHYROIDISM (HCC): ICD-10-CM

## 2021-01-14 LAB
CALCIUM SPEC-SCNC: 9.4 MG/DL (ref 8.6–10.5)
CALCIUM SPEC-SCNC: 9.5 MG/DL (ref 8.6–10.5)
CREAT SERPL-MCNC: 1.42 MG/DL (ref 0.57–1)
GFR SERPL CREATININE-BSD FRML MDRD: 36 ML/MIN/1.73
MAGNESIUM SERPL-MCNC: 2.3 MG/DL (ref 1.6–2.4)
PHOSPHATE SERPL-MCNC: 4.4 MG/DL (ref 2.5–4.5)
PTH-INTACT SERPL-MCNC: 27 PG/ML (ref 15–65)

## 2021-01-14 PROCEDURE — 82565 ASSAY OF CREATININE: CPT

## 2021-01-14 PROCEDURE — 83735 ASSAY OF MAGNESIUM: CPT

## 2021-01-14 PROCEDURE — 83970 ASSAY OF PARATHORMONE: CPT

## 2021-01-14 PROCEDURE — 82310 ASSAY OF CALCIUM: CPT

## 2021-01-14 PROCEDURE — 36415 COLL VENOUS BLD VENIPUNCTURE: CPT

## 2021-01-14 PROCEDURE — 84100 ASSAY OF PHOSPHORUS: CPT

## 2021-01-16 ENCOUNTER — LAB (OUTPATIENT)
Dept: LAB | Facility: HOSPITAL | Age: 71
End: 2021-01-16

## 2021-01-16 DIAGNOSIS — E21.3 HYPERPARATHYROIDISM (HCC): ICD-10-CM

## 2021-01-16 LAB
COLLECT DURATION TIME UR: 24 HRS
CREAT UR-MCNC: 26.3 MG/DL
CREATINE 24H UR-MRATE: 0.92 G/24 HR (ref 0.7–1.6)
SPECIMEN VOL 24H UR: 3500 ML

## 2021-01-16 PROCEDURE — 82570 ASSAY OF URINE CREATININE: CPT

## 2021-01-16 PROCEDURE — 81050 URINALYSIS VOLUME MEASURE: CPT

## 2021-01-25 ENCOUNTER — TELEPHONE (OUTPATIENT)
Dept: CARDIOLOGY | Facility: CLINIC | Age: 71
End: 2021-01-25

## 2021-01-25 NOTE — TELEPHONE ENCOUNTER
Pt is scheduled for L2 -L5 posterior decompression instrumented fusion with transformnial lumber interbody infusion at L4-L5 bone marrow aspiration on 02/04/2021 with Dr. Mitchell.      Pt will need preop risk assessment to proceed.

## 2021-01-25 NOTE — TELEPHONE ENCOUNTER
Date of Office Visit:  2021  Encounter Provider:  Mir Martinez MD  Place of Service:  [facility] CARDIOLOGY  Patient Name: Norma Ro  :  1950        To Whom it May Concern:      Ms Ro has a history of relatively mild sinus bradycardia which is asymptomatic.  She does not require a pacemaker.  She has good heart rate variability by previous monitoring studies.  She has hypertension which is well controlled.  She has a history of a false positive stress test without any significant coronary disease by a CT angiogram in 2017. She does not have CHF.    She is at low risk of major adverse CV events with intermediate risk surgery.     Please contact our office with any questions or concerns. As always, it has been a pleasure to participate in your patient's care.      Mir Martinez MD  [facility]

## 2021-06-25 ENCOUNTER — TELEPHONE (OUTPATIENT)
Dept: CARDIOLOGY | Facility: CLINIC | Age: 71
End: 2021-06-25

## 2021-06-25 NOTE — TELEPHONE ENCOUNTER
Pt called and left a vm to request preop risk assessment for upcoming risk surgery.  I called pt back to discuss but had to leave a vm.

## 2021-06-25 NOTE — TELEPHONE ENCOUNTER
Pt called back and Dr. Penny at [facility] [place] on 07/02/2021.  Surgery will be of the right hip.

## 2021-06-25 NOTE — TELEPHONE ENCOUNTER
Date of Office Visit:  2021  Encounter Provider:  Mir Martinez MD  Place of Service:  [facility] CARDIOLOGY  Patient Name: Norma Ro  :  1950           To Whom it May Concern:        Ms Ro has a history of relatively mild sinus bradycardia which is asymptomatic.  She does not require a pacemaker.  She has good heart rate variability by previous monitoring studies.  She has hypertension which is well controlled.  She has a history of a false positive stress test without any significant coronary disease by a CT angiogram in 2017. She does not have CHF.     She is at low risk of major adverse CV events with intermediate risk surgery.      Please contact our office with any questions or concerns. As always, it has been a pleasure to participate in your patient's care.        Mir Martinez MD  [facility]

## 2021-06-29 NOTE — TELEPHONE ENCOUNTER
Faxed communication to [phone] given to me by Dr. Mitchell's office.  Pt called previously and left me a number but it was missing the last number.  I called pt back, but had to leave a vm.

## 2021-09-09 ENCOUNTER — TRANSCRIBE ORDERS (OUTPATIENT)
Dept: ADMINISTRATIVE | Facility: HOSPITAL | Age: 71
End: 2021-09-09

## 2021-09-09 ENCOUNTER — LAB (OUTPATIENT)
Dept: LAB | Facility: HOSPITAL | Age: 71
End: 2021-09-09

## 2021-09-09 ENCOUNTER — HOSPITAL ENCOUNTER (OUTPATIENT)
Dept: CT IMAGING | Facility: HOSPITAL | Age: 71
Discharge: HOME | End: 2021-09-09

## 2021-09-09 DIAGNOSIS — I13.10 MALIGNANT HYPERTENSIVE HEART AND CHRONIC KIDNEY DISEASE STAGE III (HCC): ICD-10-CM

## 2021-09-09 DIAGNOSIS — N18.30 MALIGNANT HYPERTENSIVE HEART AND CHRONIC KIDNEY DISEASE STAGE III (HCC): Primary | ICD-10-CM

## 2021-09-09 DIAGNOSIS — I13.10 MALIGNANT HYPERTENSIVE HEART AND CHRONIC KIDNEY DISEASE STAGE III (HCC): Primary | ICD-10-CM

## 2021-09-09 DIAGNOSIS — K43.2 RECURRENT INCISIONAL HERNIA: ICD-10-CM

## 2021-09-09 DIAGNOSIS — N18.30 MALIGNANT HYPERTENSIVE HEART AND CHRONIC KIDNEY DISEASE STAGE III (HCC): ICD-10-CM

## 2021-09-09 LAB
ALBUMIN SERPL-MCNC: 4 G/DL (ref 3.5–5.2)
ANION GAP SERPL CALCULATED.3IONS-SCNC: 11.4 MMOL/L (ref 5–15)
BACTERIA UR QL AUTO: NORMAL /HPF
BASOPHILS # BLD AUTO: 0.04 10*3/MM3 (ref 0–0.2)
BASOPHILS NFR BLD AUTO: 0.6 % (ref 0–1.5)
BILIRUB UR QL STRIP: NEGATIVE
BUN SERPL-MCNC: 35 MG/DL (ref 8–23)
BUN/CREAT SERPL: 29.7 (ref 7–25)
CALCIUM SPEC-SCNC: 9.1 MG/DL (ref 8.6–10.5)
CHLORIDE SERPL-SCNC: 102 MMOL/L (ref 98–107)
CLARITY UR: CLEAR
CO2 SERPL-SCNC: 28.6 MMOL/L (ref 22–29)
COLOR UR: YELLOW
CREAT SERPL-MCNC: 1.18 MG/DL (ref 0.57–1)
DEPRECATED RDW RBC AUTO: 41.3 FL (ref 37–54)
EOSINOPHIL # BLD AUTO: 0.34 10*3/MM3 (ref 0–0.4)
EOSINOPHIL NFR BLD AUTO: 4.9 % (ref 0.3–6.2)
ERYTHROCYTE [DISTWIDTH] IN BLOOD BY AUTOMATED COUNT: 12.5 % (ref 12.3–15.4)
GFR SERPL CREATININE-BSD FRML MDRD: 45 ML/MIN/1.73
GLUCOSE SERPL-MCNC: 90 MG/DL (ref 65–99)
GLUCOSE UR STRIP-MCNC: NEGATIVE MG/DL
HCT VFR BLD AUTO: 36.6 % (ref 34–46.6)
HGB BLD-MCNC: 12 G/DL (ref 12–15.9)
HGB UR QL STRIP.AUTO: NEGATIVE
HYALINE CASTS UR QL AUTO: NORMAL /LPF
IMM GRANULOCYTES # BLD AUTO: 0.02 10*3/MM3 (ref 0–0.05)
IMM GRANULOCYTES NFR BLD AUTO: 0.3 % (ref 0–0.5)
KETONES UR QL STRIP: NEGATIVE
LEUKOCYTE ESTERASE UR QL STRIP.AUTO: ABNORMAL
LYMPHOCYTES # BLD AUTO: 1.95 10*3/MM3 (ref 0.7–3.1)
LYMPHOCYTES NFR BLD AUTO: 28.3 % (ref 19.6–45.3)
MCH RBC QN AUTO: 29.6 PG (ref 26.6–33)
MCHC RBC AUTO-ENTMCNC: 32.8 G/DL (ref 31.5–35.7)
MCV RBC AUTO: 90.4 FL (ref 79–97)
MONOCYTES # BLD AUTO: 0.59 10*3/MM3 (ref 0.1–0.9)
MONOCYTES NFR BLD AUTO: 8.6 % (ref 5–12)
NEUTROPHILS NFR BLD AUTO: 3.96 10*3/MM3 (ref 1.7–7)
NEUTROPHILS NFR BLD AUTO: 57.3 % (ref 42.7–76)
NITRITE UR QL STRIP: NEGATIVE
NRBC BLD AUTO-RTO: 0 /100 WBC (ref 0–0.2)
PH UR STRIP.AUTO: 7.5 [PH] (ref 5–8)
PHOSPHATE SERPL-MCNC: 4 MG/DL (ref 2.5–4.5)
PLATELET # BLD AUTO: 314 10*3/MM3 (ref 140–450)
PMV BLD AUTO: 10.8 FL (ref 6–12)
POTASSIUM SERPL-SCNC: 4.4 MMOL/L (ref 3.5–5.2)
PROT UR QL STRIP: NEGATIVE
RBC # BLD AUTO: 4.05 10*6/MM3 (ref 3.77–5.28)
RBC # UR: NORMAL /HPF
REF LAB TEST METHOD: NORMAL
SODIUM SERPL-SCNC: 142 MMOL/L (ref 136–145)
SP GR UR STRIP: 1.01 (ref 1–1.03)
SQUAMOUS #/AREA URNS HPF: NORMAL /HPF
UROBILINOGEN UR QL STRIP: ABNORMAL
WBC # BLD AUTO: 6.9 10*3/MM3 (ref 3.4–10.8)
WBC UR QL AUTO: NORMAL /HPF

## 2021-09-09 PROCEDURE — 81001 URINALYSIS AUTO W/SCOPE: CPT

## 2021-09-09 PROCEDURE — 36415 COLL VENOUS BLD VENIPUNCTURE: CPT

## 2021-09-09 PROCEDURE — 80069 RENAL FUNCTION PANEL: CPT

## 2021-09-09 PROCEDURE — 74176 CT ABD & PELVIS W/O CONTRAST: CPT

## 2021-09-09 PROCEDURE — 85025 COMPLETE CBC W/AUTO DIFF WBC: CPT

## 2021-09-15 NOTE — PROGRESS NOTES
SURGERY  Norma Hernandezton   1950 09/20/21    Chief Complaint:  Complex multiple ventral hernias    HPI    Patient is a nice 71 y.o. female who presents with her ventral hernias, noted 2019 when she came in with hyperparathyroidism.  At that time, i felt like she had a hernia which might need addressing.  She has had this for some time, and it was evaluated at [facility] in [place], and no pursuit of repair felt advisable.     Now, she has undergone a parathyroidectomy and has done well.  Her PTH has been intermittently elevated (???lab error) but last one was 27.  She wants to have her hernia addressed.  This all began after a gastric bypass which was initially successful with weight loss then recidivism.  She had a laparoscopic repair at [facility] in 2014 which I was able to obtain the notes from and reviewed.  There was entry into the abdomen under direct vision in the lower abdomen, adhesiolysis for one hours and then 2 separate pieces of mesh, a 20 cm seprafilm and an 8 cm goretex with greater than 50 tacs.  (Permasorb)       At her last visit, I talked about the significant risk surgery would entail and we discussed the fact that I would not operate on her with a BMI of 44.  She was referred to [facility] for surgery and happily she is already lost 40 pounds now with the HMR.  She has had problems with 2 surgeries in the interim, both with her back, needing a wound vac and has what appears to be a large seroma on her right hip from her hip replacement.  She believes that she can lose more and wants to do so.  Her symptoms have actually abated somewhat since she has had the weight loss.      She has chronic kidney disease stage III, with Vitamin D deficiency,     She has a family history of Factor V so she was checked and was negative.  No family history of parathyroid problems, but her mother was hypothyroid.    Since her last visit, CT was obtained (no IV contrast) that showed no visible hernia.   There is marked laxity of the abdominal wall, with possible disruption of the internal oblique, covered by the external oblique, but no distinct hernia.  There is a large amount of mesh.  Incidentally, there was mild fullness of the left renal collecting system, without ability to clarify distally due to artifact and a potential solid lesion of the upper pole left kidney.  Suggestions were US or renal CT.  She has already seen Dr Marcelino Levine and she will be seeing Dr Headley (Urogyn) and will address the renal lesion then.      Past Medical History:   Diagnosis Date   • Agatston CAC score, <100     2017: 47   • Anemia    • Arthritis    • Broken bones    • Chronic kidney disease    • Depression    • Depression    • False positive stress test     2017 -- coronary artery CTA showed no significant CAD   • GERD (gastroesophageal reflux disease)    • Headache     occ   • Hearing loss     asim hearing aids   • History of blood clots    • History of cardiovascular stress test     normal Cardiolite 9/2016   • Hypertension    • Hyperthyroidism    • Hypoglycemia     Rebound hypoglycemia   • Hypothyroid    • Hypothyroidism    • Incisional hernia     s/p surgical repair, 2014   • Incontinence in female    • Lower back pain    • Morbid obesity (CMS/HCC)     s/p remote gastric bypass   • Osteoarthritis    • Sinus bradycardia    • UTI (urinary tract infection)    • Varicose veins      Past Surgical History:   Procedure Laterality Date   • ANKLE ARTHRODESIS Right 01/23/2019    Right open ankle arthrodesis, harvest of proximal tibial bone graft as major graft, exostectomy, medial great toe-Dr. Santy Romero   • CARPAL TUNNEL RELEASE Left    • CHOLECYSTECTOMY     • COLONOSCOPY N/A 11/3/2017    Procedure: COLONOSCOPY; polypectomy;  Surgeon: Chevy Zuniga MD;  Location: [facility];  Service:    • COLONOSCOPY N/A 03/25/2007    Normal-Dr. Timoteo Prado   • EXTERNAL FIXATOR APPLICATION     • EYE SURGERY  08/26/2020      [facility]   • GASTRIC BYPASS      1980s   • HERNIA REPAIR  2014   • LUMBAR FUSION Bilateral 02/04/2021   • ORIF TIBIA/FIBULA FRACTURES Right 2005   • PARATHYROIDECTOMY N/A 11/20/2019    Procedure: left inferior parathyroid mediastinal resection and right inferior parathyroid resection, thyroid mass resection;  Surgeon: Gianna Knowles MD;  Location: [facility] OR;  Service: General   • TOTAL HIP ARTHROPLASTY Right 07/2021   • UPPER GASTROINTESTINAL ENDOSCOPY N/A 03/25/2007    No gross lesions in the esophagus, non-bleeding erythematous gastropathy, antrectomy with a small gastric pouch remnant, Billroth II anatomy with a gastrojejunostomy-Dr. Timoteo Prado     Family History   Problem Relation Age of Onset   • Aortic aneurysm Father         abdominal   • Heart disease Father    • Hypertension Brother    • Stroke Brother    • Intracerebral hemorrhage Brother    • Heart disease Brother    • Heart disease Maternal Grandfather    • Stroke Paternal Grandmother    • Heart disease Paternal Grandfather    • Diabetes Mother    • Heart disease Mother    • Malig Hyperthermia Neg Hx      Social History     Socioeconomic History   • Marital status:      Spouse name: Not on file   • Number of children: Not on file   • Years of education: Not on file   • Highest education level: Not on file   Tobacco Use   • Smoking status: Never Smoker   • Smokeless tobacco: Never Used   • Tobacco comment: secondary smoke   Vaping Use   • Vaping Use: Never used   Substance and Sexual Activity   • Alcohol use: No     Comment: very seldom   • Drug use: No   • Sexual activity: Not Currently     Partners: Male     Birth control/protection: Post-menopausal, None     Comment:  is impotent         Current Outpatient Medications:   •  B Complex Vitamins (VITAMIN B COMPLEX) capsule capsule, Take 1 capsule by mouth Every Night., Disp: , Rfl:   •  bisacodyl (DULCOLAX) 5 MG EC tablet, Take 5 mg by mouth Daily As Needed for Constipation., Disp: ,  Rfl:   •  calcitriol (ROCALTROL) 0.5 MCG capsule, Take 0.5 mcg by mouth Daily., Disp: , Rfl:   •  Cholecalciferol (VITAMIN D3) 75 MCG (3000 UT) tablet, Take 3,000 Units by mouth Daily., Disp: , Rfl:   •  cyclobenzaprine (FLEXERIL) 5 MG tablet, Take 5 mg by mouth As Needed., Disp: , Rfl:   •  docusate sodium (COLACE) 100 MG capsule, Take 100 mg by mouth 2 (Two) Times a Day., Disp: , Rfl:   •  DULoxetine (CYMBALTA) 20 MG capsule, Take 20 mg by mouth 2 (Two) Times a Day., Disp: , Rfl:   •  famotidine (PEPCID) 20 MG tablet, Take 20 mg by mouth Daily., Disp: , Rfl:   •  ferrous sulfate (IRON SUPPLEMENT) 325 (65 FE) MG tablet, Take 325 mg by mouth Every Other Day., Disp: , Rfl:   •  gabapentin (NEURONTIN) 600 MG tablet, Take 300 mg by mouth 3 (Three) Times a Day., Disp: , Rfl:   •  Gemtesa 75 MG tablet, Take 75 mg by mouth Daily., Disp: , Rfl:   •  HYDROcodone-acetaminophen (NORCO) 5-325 MG per tablet, Take 1 tablet by mouth Every 6 (Six) Hours As Needed., Disp: , Rfl:   •  levothyroxine (SYNTHROID, LEVOTHROID) 125 MCG tablet, Take 125 mcg by mouth Every Morning., Disp: , Rfl:   •  Loratadine 10 MG capsule, Take 10 mg by mouth Daily., Disp: , Rfl:   •  oxyCODONE-acetaminophen (PERCOCET) 7.5-325 MG per tablet, Take 1 tablet by mouth Every 6 (Six) Hours As Needed., Disp: , Rfl:   •  polyethylene glycol (MIRALAX) 17 g packet, Take 17 g by mouth Daily., Disp: , Rfl:   •  Probiotic Product (PROBIOTIC PO), Take 1 capsule by mouth Daily., Disp: , Rfl:   •  Red Yeast Rice Extract 600 MG capsule, Take 600 mg by mouth 2 (Two) Times a Day., Disp: , Rfl:   •  traMADol (ULTRAM) 50 MG tablet, Take 50 mg by mouth Every 6 (Six) Hours As Needed., Disp: , Rfl:   •  TURMERIC PO, Take 1 tablet by mouth Every Night., Disp: , Rfl:   •  vitamin B-12 (CYANOCOBALAMIN) 1000 MCG tablet, Take 1,000 mcg by mouth Daily., Disp: , Rfl:     Allergies   Allergen Reactions   • Baclofen Dizziness and Arrhythmia   • Morphine Itching and Other (See Comments)  "    Chest tightness, Abdominal Pain   • Nsaids Other (See Comments)     DUE TO CKD STAGE III   • Flagyl [Metronidazole] Other (See Comments)     headaches   • Lodine [Etodolac] Hives     Blotches under the skin   • Macrodantin [Nitrofurantoin] Other (See Comments)     headaches   • Other Rash     LODINE  BLOTCHES ON SKIN        • Tetracyclines & Related Hives and Rash     Review of Systems   Constitutional: Positive for unexpected weight loss. Negative for fever.   HENT: Positive for tinnitus.    Gastrointestinal: Positive for abdominal pain, constipation, diarrhea and nausea. Negative for vomiting.   Genitourinary: Positive for frequency. Negative for dysuria and hematuria.   Musculoskeletal: Positive for arthralgias, back pain, myalgias and bursitis.   Neurological: Positive for weakness, light-headedness and headache.   Psychiatric/Behavioral: Positive for sleep disturbance and depressed mood.   All other systems reviewed and are negative.      Vitals:    09/20/21 1311   Weight: 94.8 kg (209 lb)   Height: 157.5 cm (62.01\")       PHYSICAL EXAM:    Ht 157.5 cm (62.01\")   Wt 94.8 kg (209 lb)   BMI 38.21 kg/m²   Body mass index is 38.21 kg/m².    Constitutional: well developed, well nourished, appears moderately healthy, stated age, increased oral motion  Eyes: sclera nonicteric, conjunctiva not injected   ENMT: Hearing intact, trachea midline, thyroid without masses  CVS: RRR, no murmur, peripheral edema 1+, varicosities on left leg  Respiratory: CTA, normal respiratory effort   Gastrointestinal: no hepatosplenomegaly, abdomen obese, with pannus, abdominal hernia suspected in the left and right aspect of her chevron incisional scar.  That on the left with a billowy character, and today perhaps even more consistent with simply thinning abdominal wall that on the right with nodules that appear to be incarcerated, with some in the midline as well that are present, with Swiss cheese effect.  The incisional scar is at " least 3 cm below the area of the fascial defect, illustrating the amount of ptosis of the skin that she has.  She has a large pannus and is wondering about having that removed as well  Genitourinary: inguinal hernia not detected  Musculoskeletal: gait slowed and swinging, muscle mass normal incisional scar on lower anterior, ankle from recent surgery  Skin: warm and dry, no rashes visible  Neurological: awake and alert, seems to have reasonable capacity for understanding for medical decision making  Psychiatric: appears to have reasonable judgement, pleasant  Lymphatics: no cervical adenopathy    Radiographic Findings: CT scan from [place] daughter with apparent suggestion as to a spigellian hernia as well as recurrent ventral hernia.  That CT was years ago    IMPRESSION:  · Multiple incisional hernias suspected by exam, but none by CT, would be recurrent with hernia repair elsewhere with 20 cm Seprafilm, 8 cm [place] .    · Hyperparathyroidism status post mediastinal adenoma on the left resection with resolution, with what apparently was factitious elevation of PTH last year.  Most recent PTH 27.  Urine calcium quite low at 28/24 hours.  Being followed by Dr Mazin Ortiz   · Vitamin D deficiency, on calcitriol  · Chronic kidney disease stage II/III.  I reviewed Dr Marcelino Levine's last note.  · Depression, chronic on duloxetine, not felt attributable to hyperparathyroidism  · Hypothyroidism  Body mass index is 38.21 kg/m².  · Chronic pain on tramadol  · Anemia on iron supplement, status post gastric bypass    PLAN:  · Continue weight loss.  This will help with symptoms and increase the chances that her thin abdominal wall will not tear.  She understands that.    · Discussed with patient increased perioperative risks associated with obesity including increased risks of DVT, infection, seromas, poor wound healing and hernias (with abdominal surgery).  · US of the left kidney.  Will defer decision regarding dedicated  renal CT to her Urogyn.  · Defer addressing abdominal wall at this time.  · Follow up with ortho regarding seroma.    Gianna Knowles MD  09/20/21    Greater than 25 minutes spent with over half in counseling face-to-face

## 2021-09-20 ENCOUNTER — OFFICE VISIT (OUTPATIENT)
Dept: SURGERY | Facility: CLINIC | Age: 71
End: 2021-09-20

## 2021-09-20 VITALS — WEIGHT: 209 LBS | BODY MASS INDEX: 38.46 KG/M2 | HEIGHT: 62 IN

## 2021-09-20 DIAGNOSIS — K43.2 RECURRENT INCISIONAL HERNIA: ICD-10-CM

## 2021-09-20 DIAGNOSIS — E21.3 HYPERPARATHYROIDISM (HCC): ICD-10-CM

## 2021-09-20 DIAGNOSIS — R93.422 ABNORMAL FINDING ON DIAGNOSTIC IMAGING OF LEFT KIDNEY: Primary | ICD-10-CM

## 2021-09-20 PROCEDURE — 99214 OFFICE O/P EST MOD 30 MIN: CPT | Performed by: SURGERY

## 2021-09-20 RX ORDER — LANOLIN ALCOHOL/MO/W.PET/CERES
1000 CREAM (GRAM) TOPICAL DAILY
COMMUNITY

## 2021-09-20 RX ORDER — POLYETHYLENE GLYCOL 3350 17 G/17G
17 POWDER, FOR SOLUTION ORAL DAILY
COMMUNITY
End: 2022-01-05 | Stop reason: ALTCHOICE

## 2021-09-20 RX ORDER — HYDROCODONE BITARTRATE AND ACETAMINOPHEN 5; 325 MG/1; MG/1
1 TABLET ORAL EVERY 6 HOURS PRN
COMMUNITY

## 2021-09-20 RX ORDER — VIBEGRON 75 MG/1
75 TABLET, FILM COATED ORAL DAILY
COMMUNITY
Start: 2021-07-28 | End: 2022-01-05 | Stop reason: ALTCHOICE

## 2021-09-20 RX ORDER — DOCUSATE SODIUM 100 MG/1
100 CAPSULE, LIQUID FILLED ORAL 2 TIMES DAILY
COMMUNITY
End: 2022-01-05 | Stop reason: ALTCHOICE

## 2021-09-20 RX ORDER — BISACODYL 5 MG/1
5 TABLET, DELAYED RELEASE ORAL DAILY PRN
COMMUNITY
End: 2022-01-05 | Stop reason: ALTCHOICE

## 2021-09-20 RX ORDER — LORATADINE 10 MG/1
10 CAPSULE, LIQUID FILLED ORAL AS NEEDED
COMMUNITY

## 2021-09-20 RX ORDER — OXYCODONE AND ACETAMINOPHEN 7.5; 325 MG/1; MG/1
1 TABLET ORAL EVERY 6 HOURS PRN
COMMUNITY
Start: 2021-07-28 | End: 2022-01-05 | Stop reason: ALTCHOICE

## 2021-09-28 ENCOUNTER — HOSPITAL ENCOUNTER (OUTPATIENT)
Dept: ULTRASOUND IMAGING | Facility: HOSPITAL | Age: 71
Discharge: HOME | End: 2021-09-28
Admitting: SURGERY

## 2021-09-28 DIAGNOSIS — R93.422 ABNORMAL FINDING ON DIAGNOSTIC IMAGING OF LEFT KIDNEY: ICD-10-CM

## 2021-09-28 PROCEDURE — 76775 US EXAM ABDO BACK WALL LIM: CPT

## 2021-12-29 ENCOUNTER — TRANSCRIBE ORDERS (OUTPATIENT)
Dept: ADMINISTRATIVE | Facility: HOSPITAL | Age: 71
End: 2021-12-29

## 2021-12-29 DIAGNOSIS — N18.32 STAGE 3B CHRONIC KIDNEY DISEASE: Primary | ICD-10-CM

## 2022-01-05 ENCOUNTER — OFFICE VISIT (OUTPATIENT)
Dept: CARDIOLOGY | Facility: CLINIC | Age: 72
End: 2022-01-05

## 2022-01-05 VITALS
DIASTOLIC BLOOD PRESSURE: 68 MMHG | HEART RATE: 68 BPM | BODY MASS INDEX: 41.41 KG/M2 | SYSTOLIC BLOOD PRESSURE: 118 MMHG | WEIGHT: 225 LBS | HEIGHT: 62 IN

## 2022-01-05 DIAGNOSIS — E66.01 MORBID OBESITY WITH BMI OF 40.0-44.9, ADULT: ICD-10-CM

## 2022-01-05 DIAGNOSIS — I10 PRIMARY HYPERTENSION: Primary | ICD-10-CM

## 2022-01-05 DIAGNOSIS — Z78.9 FALSE POSITIVE STRESS TEST: ICD-10-CM

## 2022-01-05 DIAGNOSIS — R07.2 PRECORDIAL PAIN: ICD-10-CM

## 2022-01-05 DIAGNOSIS — R93.1 AGATSTON CAC SCORE, <100: ICD-10-CM

## 2022-01-05 DIAGNOSIS — R06.09 DYSPNEA ON EXERTION: ICD-10-CM

## 2022-01-05 DIAGNOSIS — R00.1 SINUS BRADYCARDIA: ICD-10-CM

## 2022-01-05 PROBLEM — M81.0 OSTEOPOROSIS: Status: ACTIVE | Noted: 2021-07-13

## 2022-01-05 PROBLEM — M19.072 ARTHRITIS OF LEFT ANKLE: Status: ACTIVE | Noted: 2019-10-25

## 2022-01-05 PROBLEM — IMO0002 NUCLEAR CATARACT: Status: ACTIVE | Noted: 2020-07-28

## 2022-01-05 PROBLEM — E55.9 VITAMIN D DEFICIENCY: Status: ACTIVE | Noted: 2021-01-06

## 2022-01-05 PROBLEM — M25.571 ACUTE RIGHT ANKLE PAIN: Status: ACTIVE | Noted: 2018-12-28

## 2022-01-05 PROCEDURE — 99214 OFFICE O/P EST MOD 30 MIN: CPT | Performed by: NURSE PRACTITIONER

## 2022-01-05 PROCEDURE — 93000 ELECTROCARDIOGRAM COMPLETE: CPT | Performed by: NURSE PRACTITIONER

## 2022-01-05 RX ORDER — PYRIDOXINE HCL (VITAMIN B6) 100 MG
1 TABLET ORAL EVERY 8 HOURS
Status: ON HOLD | COMMUNITY
End: 2022-03-09

## 2022-01-05 RX ORDER — LISINOPRIL 5 MG/1
5 TABLET ORAL DAILY
COMMUNITY
Start: 2021-12-20

## 2022-01-05 RX ORDER — AMLODIPINE BESYLATE 5 MG/1
1 TABLET ORAL DAILY
Status: ON HOLD | COMMUNITY
End: 2022-03-09

## 2022-01-05 RX ORDER — LISINOPRIL 5 MG/1
TABLET ORAL
COMMUNITY
Start: 2021-10-17 | End: 2022-01-05 | Stop reason: SDUPTHER

## 2022-01-05 RX ORDER — ONDANSETRON 4 MG/1
TABLET, ORALLY DISINTEGRATING ORAL
Status: ON HOLD | COMMUNITY
Start: 2021-12-09 | End: 2022-03-09

## 2022-01-05 RX ORDER — POLYETHYLENE GLYCOL 3350 17 G/17G
17 POWDER, FOR SOLUTION ORAL AS NEEDED
COMMUNITY

## 2022-01-05 RX ORDER — ESTRADIOL 0.1 MG/G
1 CREAM VAGINAL
COMMUNITY
Start: 2021-11-04

## 2022-01-05 RX ORDER — TURMERIC 400 MG
CAPSULE ORAL
COMMUNITY
End: 2022-01-05 | Stop reason: SDUPTHER

## 2022-01-05 NOTE — PROGRESS NOTES
Date of Office Visit: 2022  Encounter Provider: LEWIS Mancilla  Place of Service: [facility] CARDIOLOGY  Patient Name: Norma Ro  :1950  Primary Cardiologist: Dr. Martinez    Chief Complaint   Patient presents with   • essential hypertension     1 yr   :     Dear Dr. Ledezma    HPI: Norma Ro is a pleasant 71 y.o. female who presents 2022 for cardiac follow up.  She is a new patient to me and I reviewed her past medical records.  She is a patient of Dr. Martinez.    She initially saw Dr. Adhikari in 2014 for preoperative risk assessment prior to hernia repair; he recommended no testing as she was aysmptomatic. Her pulse was 66 bpm at that time.     She saw Dr. Martinez, initially, in 2016 for the evaluation of sinus bradycardia.  She has hypothyroidism, and in 2016 she was hyperthyroid on her levothyroxine. The dose was decreased and her TSH jumped to 21 in mid 2016. Around that time, she also became concerned about her heart rate; a Holter monitor showed an average pulse of 58 bpm, with a range of 38-94. There were extremely rare ectopics, and no pauses.      She was then started on lisinopril for hypertension in 2016.  She has CKD and follows with Dr. Levine for this.      In 2017, she noted dyspnea and atypical chest pain.  A Cardiolite indicated anterior ischemia.  She wanted to avoid coronary angiography so coronary CTA was recommended.  Unfortunately, she was given oral metoprolol prior to the study per radiology protocol and became quite bradycardic and required overnight observation.  Her CTA showed no occlusive CAD; her CACS was 47.     She has chronic mild edema but has large varicose veins (and is morbidly obese). She was seen in the vein care center in 2016 and she declined a venous doppler (to check for reflux).  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  Compression hose were recommended but she couldn't  "wear them due to leg size.     She is here for her annual cardiac evaluation.  She states that in February 2021 she had confusion from L2-L5.  July 2021 she had a right total hip replacement.  December 15, 2021 she had a bladder stimulator placed.  She states she progressed well through all of these.  She has some chronic shortness of breath and lower extremity edema.  She denies any palpitations, she will rarely have some dizziness.  She denies fatigue.  She continues to work.  She is on her feet a lot.  She take her medications as directed.  She states she has noticed over \"several years and I just never told anyone\", that she will have some right sided pain with walking that is intermediate.  She feels like it is \"in my right bronchial\".  It is not palpable.  She states it starts in the back and goes to the front.  It is  almost in the middle of her right breast.  She states is always in the same spot.  It subsides with rest.      Past Medical History:   Diagnosis Date   • Agatston CAC score, <100     2017: 47   • Anemia    • Arthritis    • Broken bones    • Chronic kidney disease    • Depression    • Depression    • False positive stress test     2017 -- coronary artery CTA showed no significant CAD   • GERD (gastroesophageal reflux disease)    • Headache     occ   • Hearing loss     asim hearing aids   • History of blood clots    • History of cardiovascular stress test     normal Cardiolite 9/2016   • Hypertension    • Hyperthyroidism    • Hypoglycemia     Rebound hypoglycemia   • Hypothyroid    • Hypothyroidism    • Incisional hernia     s/p surgical repair, 2014   • Incontinence in female    • Lower back pain    • Morbid obesity (HCC)     s/p remote gastric bypass   • Osteoarthritis    • Sinus bradycardia    • UTI (urinary tract infection)    • Varicose veins        Past Surgical History:   Procedure Laterality Date   • ANKLE ARTHRODESIS Right 01/23/2019    Right open ankle arthrodesis, harvest of proximal " tibial bone graft as major graft, exostectomy, medial great toe-Dr. Santy Romero   • CARPAL TUNNEL RELEASE Left    • CHOLECYSTECTOMY     • COLONOSCOPY N/A 11/3/2017    Procedure: COLONOSCOPY; polypectomy;  Surgeon: Chevy Zuniga MD;  Location:  [facility] OR;  Service:    • COLONOSCOPY N/A 03/25/2007    Normal-Dr. Timoteo Prado   • EXTERNAL FIXATOR APPLICATION     • EYE SURGERY  08/26/2020    Dr. Avalos   • GASTRIC BYPASS      1980s   • HERNIA REPAIR  2014   • LUMBAR FUSION Bilateral 02/04/2021   • ORIF TIBIA/FIBULA FRACTURES Right 2005   • PARATHYROIDECTOMY N/A 11/20/2019    Procedure: left inferior parathyroid mediastinal resection and right inferior parathyroid resection, thyroid mass resection;  Surgeon: Gianna Knowles MD;  Location: [facility] MAIN OR;  Service: General   • TOTAL HIP ARTHROPLASTY Right 07/2021   • UPPER GASTROINTESTINAL ENDOSCOPY N/A 03/25/2007    No gross lesions in the esophagus, non-bleeding erythematous gastropathy, antrectomy with a small gastric pouch remnant, Billroth II anatomy with a gastrojejunostomy-Dr. Timoteo Prado       Social History     Socioeconomic History   • Marital status:    Tobacco Use   • Smoking status: Never Smoker   • Smokeless tobacco: Never Used   • Tobacco comment: secondary smoke   Vaping Use   • Vaping Use: Never used   Substance and Sexual Activity   • Alcohol use: No     Comment: very seldom   • Drug use: No   • Sexual activity: Not Currently     Partners: Male     Birth control/protection: Post-menopausal, None     Comment:  is impotent       Family History   Problem Relation Age of Onset   • Aortic aneurysm Father         abdominal   • Heart disease Father    • Hypertension Brother    • Stroke Brother    • Intracerebral hemorrhage Brother    • Heart disease Brother    • Heart disease Maternal Grandfather    • Stroke Paternal Grandmother    • Heart disease Paternal Grandfather    • Diabetes Mother    • Heart disease Mother    • Malig  Hyperthermia Neg Hx        The following portion of the patient's history were reviewed and updated as appropriate: past medical history, past surgical history, past social history, past family history, allergies, current medications, and problem list.    Review of Systems   Constitutional: Negative for diaphoresis, fever and malaise/fatigue.   HENT: Negative for congestion, hearing loss, hoarse voice, nosebleeds and sore throat.    Eyes: Negative for photophobia, vision loss in left eye, vision loss in right eye and visual disturbance.   Cardiovascular: Positive for chest pain (atypical) and leg swelling (chronic). Negative for dyspnea on exertion, irregular heartbeat, near-syncope, orthopnea, palpitations, paroxysmal nocturnal dyspnea and syncope.   Respiratory: Positive for shortness of breath (chronic). Negative for cough, hemoptysis, sleep disturbances due to breathing, snoring, sputum production and wheezing.    Endocrine: Negative for cold intolerance, heat intolerance, polydipsia, polyphagia and polyuria.   Hematologic/Lymphatic: Negative for bleeding problem. Does not bruise/bleed easily.   Skin: Negative for color change, dry skin, poor wound healing, rash and suspicious lesions.   Musculoskeletal: Negative for arthritis, back pain, falls, gout, joint pain, joint swelling, muscle cramps, muscle weakness and myalgias.   Gastrointestinal: Negative for bloating, abdominal pain, constipation, diarrhea, dysphagia, melena, nausea and vomiting.   Neurological: Positive for dizziness (occ). Negative for excessive daytime sleepiness, headaches, light-headedness, loss of balance, numbness, paresthesias, seizures, vertigo and weakness.   Psychiatric/Behavioral: Negative for depression, memory loss and substance abuse. The patient is not nervous/anxious.        Allergies   Allergen Reactions   • Baclofen Dizziness and Arrhythmia   • Morphine Itching and Other (See Comments)     Chest tightness, Abdominal Pain   •  Nsaids Other (See Comments)     DUE TO CKD STAGE III   • Flagyl [Metronidazole] Other (See Comments)     headaches   • Lodine [Etodolac] Hives     Blotches under the skin   • Macrodantin [Nitrofurantoin] Other (See Comments)     headaches   • Iodine Unknown - Low Severity   • Other Rash     LODINE  BLOTCHES ON SKIN        • Tetracyclines & Related Hives and Rash         Current Outpatient Medications:   •  B Complex Vitamins (VITAMIN B COMPLEX 100 IJ), , Disp: , Rfl:   •  B Complex Vitamins (VITAMIN B COMPLEX) capsule capsule, Take 1 capsule by mouth Every Night., Disp: , Rfl:   •  calcitriol (ROCALTROL) 0.5 MCG capsule, Take 0.5 mcg by mouth Daily., Disp: , Rfl:   •  Cholecalciferol (VITAMIN D3) 75 MCG (3000 UT) tablet, Take 3,000 Units by mouth Daily., Disp: , Rfl:   •  cyclobenzaprine (FLEXERIL) 5 MG tablet, Take 5 mg by mouth As Needed., Disp: , Rfl:   •  DULoxetine (CYMBALTA) 20 MG capsule, Take 20 mg by mouth 2 (Two) Times a Day., Disp: , Rfl:   •  estradiol (ESTRACE) 0.1 MG/GM vaginal cream, Insert 1 g into the vagina., Disp: , Rfl:   •  famotidine (PEPCID) 20 MG tablet, Take 20 mg by mouth Daily., Disp: , Rfl:   •  ferrous sulfate (IRON SUPPLEMENT) 325 (65 FE) MG tablet, Take 325 mg by mouth Every Other Day., Disp: , Rfl:   •  gabapentin (NEURONTIN) 600 MG tablet, Take 300 mg by mouth 3 (Three) Times a Day., Disp: , Rfl:   •  HYDROcodone-acetaminophen (NORCO) 5-325 MG per tablet, Take 1 tablet by mouth Every 6 (Six) Hours As Needed., Disp: , Rfl:   •  levothyroxine (SYNTHROID, LEVOTHROID) 125 MCG tablet, Take 125 mcg by mouth Every Morning., Disp: , Rfl:   •  Loratadine 10 MG capsule, Take 10 mg by mouth Daily., Disp: , Rfl:   •  polyethylene glycol (MIRALAX) 17 GM/SCOOP powder, Take 17 g by mouth Daily., Disp: , Rfl:   •  Probiotic Product (PROBIOTIC PO), Take 1 capsule by mouth Daily., Disp: , Rfl:   •  Red Yeast Rice Extract 600 MG capsule, Take 600 mg by mouth 2 (Two) Times a Day., Disp: , Rfl:   •   "traMADol (ULTRAM) 50 MG tablet, Take 50 mg by mouth Every 6 (Six) Hours As Needed., Disp: , Rfl:   •  TURMERIC PO, Take 1 tablet by mouth Every Night., Disp: , Rfl:   •  vitamin B-12 (CYANOCOBALAMIN) 1000 MCG tablet, Take 1,000 mcg by mouth Daily., Disp: , Rfl:   •  amLODIPine (NORVASC) 5 MG tablet, Take 1 tablet by mouth Daily., Disp: , Rfl:   •  Ferrous Fumarate (Iron) 18 MG tablet controlled-release, Take 1 tablet by mouth Every 8 (Eight) Hours., Disp: , Rfl:   •  lisinopril (PRINIVIL,ZESTRIL) 5 MG tablet, , Disp: , Rfl:   •  ondansetron ODT (ZOFRAN-ODT) 4 MG disintegrating tablet, , Disp: , Rfl:         Objective:     Vitals:    01/05/22 1057 01/05/22 1125   BP: (!) 188/68 118/68   Pulse: 68    Weight: 102 kg (225 lb)    Height: 157.5 cm (62\")      Body mass index is 41.15 kg/m².      Vitals reviewed.   Constitutional:       General: Not in acute distress.     Appearance: Well-developed and not in distress. Morbidly obese.   Eyes:      General:         Right eye: No discharge.         Left eye: No discharge.      Conjunctiva/sclera: Conjunctivae normal.   HENT:      Head: Normocephalic and atraumatic.      Right Ear: External ear normal.      Left Ear: External ear normal.      Nose: Nose normal.   Neck:      Thyroid: No thyromegaly.      Vascular: No JVD.      Trachea: No tracheal deviation.      Lymphadenopathy: No cervical adenopathy.   Pulmonary:      Effort: Pulmonary effort is normal. No respiratory distress.      Breath sounds: Normal breath sounds. No wheezing. No rales.   Chest:      Chest wall: Not tender to palpatation.   Cardiovascular:      Normal rate. Regular rhythm.      No gallop.   Pulses:     Intact distal pulses.   Edema:     Peripheral edema present.     Pretibial: bilateral edema of the pretibial area.     Ankle: bilateral edema of the ankle.     Feet: bilateral edema of the feet.  Abdominal:      General: There is no distension.      Palpations: Abdomen is soft.      Tenderness: There is " no abdominal tenderness.   Musculoskeletal: Normal range of motion.         General: No tenderness or deformity.      Cervical back: Normal range of motion and neck supple. Skin:     General: Skin is warm and dry.      Findings: No erythema or rash.   Neurological:      Mental Status: Alert and oriented to person, place, and time.      Coordination: Coordination normal.   Psychiatric:         Attention and Perception: Attention normal.         Mood and Affect: Mood normal.         Speech: Speech normal.         Behavior: Behavior normal.         Thought Content: Thought content normal.         Cognition and Memory: Cognition normal.         Judgment: Judgment normal.               ECG 12 Lead    Date/Time: 1/5/2022 11:46 AM  Performed by: Gianna Nayak APRN  Authorized by: Gianna Nayak APRN   Comparison: compared with previous ECG from 12/29/2020  Similar to previous ECG  Rhythm: sinus rhythm  Rate: normal  Conduction: conduction normal  ST Segments: ST segments normal  T Waves: T waves normal  QRS axis: normal    Clinical impression: normal ECG              Assessment:       Diagnosis Plan   1. Primary hypertension     2. Sinus bradycardia     3. Agatston CAC score, <100     4. False positive stress test     5. Morbid obesity with BMI of 40.0-44.9, adult ([facility])     6. Precordial pain  Adult Transthoracic Echo Complete W/ Cont if Necessary Per Protocol   7. Dyspnea on exertion  Adult Transthoracic Echo Complete W/ Cont if Necessary Per Protocol          Plan:     1.  Hypertension-well controlled    2.  Sinus bradycardia -mild and asymptomatic.  She does not require pacemaker.  She has had good heart rate variability by previous monitoring studies.  Today her heart rate is 68 bpm.    3/4-Agatstan CAC score less than 100/false-positive stress test-no significant coronary disease by CT angiogram 2017.  Ideally she would be on a statin to decrease her risk but she has declined repeatedly.    5.  Morbid obesity with  BMI 41.15.  She would benefit from further weight loss.    6/7-chest pain-dyspnea on exertion.-Her chest pain sounds atypical but she does have risk factors.  She feels that her shortness of breath is a little worse especially with activity.  We will check an echo as it has been 5 years since her last one.    Check echo  RTO in 1 year with JK    As always, it has been a pleasure to participate in your patient's care. Thank you.       Sincerely,       LEWIS Mancilla      Current Outpatient Medications:   •  B Complex Vitamins (VITAMIN B COMPLEX 100 IJ), , Disp: , Rfl:   •  B Complex Vitamins (VITAMIN B COMPLEX) capsule capsule, Take 1 capsule by mouth Every Night., Disp: , Rfl:   •  calcitriol (ROCALTROL) 0.5 MCG capsule, Take 0.5 mcg by mouth Daily., Disp: , Rfl:   •  Cholecalciferol (VITAMIN D3) 75 MCG (3000 UT) tablet, Take 3,000 Units by mouth Daily., Disp: , Rfl:   •  cyclobenzaprine (FLEXERIL) 5 MG tablet, Take 5 mg by mouth As Needed., Disp: , Rfl:   •  DULoxetine (CYMBALTA) 20 MG capsule, Take 20 mg by mouth 2 (Two) Times a Day., Disp: , Rfl:   •  estradiol (ESTRACE) 0.1 MG/GM vaginal cream, Insert 1 g into the vagina., Disp: , Rfl:   •  famotidine (PEPCID) 20 MG tablet, Take 20 mg by mouth Daily., Disp: , Rfl:   •  ferrous sulfate (IRON SUPPLEMENT) 325 (65 FE) MG tablet, Take 325 mg by mouth Every Other Day., Disp: , Rfl:   •  gabapentin (NEURONTIN) 600 MG tablet, Take 300 mg by mouth 3 (Three) Times a Day., Disp: , Rfl:   •  HYDROcodone-acetaminophen (NORCO) 5-325 MG per tablet, Take 1 tablet by mouth Every 6 (Six) Hours As Needed., Disp: , Rfl:   •  levothyroxine (SYNTHROID, LEVOTHROID) 125 MCG tablet, Take 125 mcg by mouth Every Morning., Disp: , Rfl:   •  Loratadine 10 MG capsule, Take 10 mg by mouth Daily., Disp: , Rfl:   •  polyethylene glycol (MIRALAX) 17 GM/SCOOP powder, Take 17 g by mouth Daily., Disp: , Rfl:   •  Probiotic Product (PROBIOTIC PO), Take 1 capsule by mouth Daily., Disp: , Rfl:   •   Red Yeast Rice Extract 600 MG capsule, Take 600 mg by mouth 2 (Two) Times a Day., Disp: , Rfl:   •  traMADol (ULTRAM) 50 MG tablet, Take 50 mg by mouth Every 6 (Six) Hours As Needed., Disp: , Rfl:   •  TURMERIC PO, Take 1 tablet by mouth Every Night., Disp: , Rfl:   •  vitamin B-12 (CYANOCOBALAMIN) 1000 MCG tablet, Take 1,000 mcg by mouth Daily., Disp: , Rfl:   •  amLODIPine (NORVASC) 5 MG tablet, Take 1 tablet by mouth Daily., Disp: , Rfl:   •  Ferrous Fumarate (Iron) 18 MG tablet controlled-release, Take 1 tablet by mouth Every 8 (Eight) Hours., Disp: , Rfl:   •  lisinopril (PRINIVIL,ZESTRIL) 5 MG tablet, , Disp: , Rfl:   •  ondansetron ODT (ZOFRAN-ODT) 4 MG disintegrating tablet, , Disp: , Rfl:     Dictated utilizing Dragon dictation

## 2022-01-07 ENCOUNTER — APPOINTMENT (OUTPATIENT)
Dept: CT IMAGING | Facility: HOSPITAL | Age: 72
End: 2022-01-07

## 2022-02-14 ENCOUNTER — HOSPITAL ENCOUNTER (OUTPATIENT)
Dept: CARDIOLOGY | Facility: HOSPITAL | Age: 72
Discharge: HOME | End: 2022-02-14
Admitting: NURSE PRACTITIONER

## 2022-02-14 VITALS
WEIGHT: 224.87 LBS | HEART RATE: 63 BPM | BODY MASS INDEX: 41.38 KG/M2 | SYSTOLIC BLOOD PRESSURE: 132 MMHG | HEIGHT: 62 IN | DIASTOLIC BLOOD PRESSURE: 86 MMHG | OXYGEN SATURATION: 98 %

## 2022-02-14 DIAGNOSIS — R06.09 DYSPNEA ON EXERTION: ICD-10-CM

## 2022-02-14 DIAGNOSIS — R07.2 PRECORDIAL PAIN: ICD-10-CM

## 2022-02-14 LAB
AORTIC ARCH: 2.3 CM
AORTIC DIMENSIONLESS INDEX: 0.7 (DI)
ASCENDING AORTA: 3.5 CM
BH CV ECHO MEAS - ACS: 2 CM
BH CV ECHO MEAS - AO ACC TIME: 0.13 SEC
BH CV ECHO MEAS - AO MAX PG: 9 MMHG
BH CV ECHO MEAS - AO MEAN PG (FULL): 2.3 MMHG
BH CV ECHO MEAS - AO MEAN PG: 4.3 MMHG
BH CV ECHO MEAS - AO ROOT AREA (BSA CORRECTED): 1.6
BH CV ECHO MEAS - AO ROOT AREA: 8.6 CM^2
BH CV ECHO MEAS - AO ROOT DIAM: 3.3 CM
BH CV ECHO MEAS - AO V2 MAX: 153 CM/SEC
BH CV ECHO MEAS - AO V2 MEAN: 93.4 CM/SEC
BH CV ECHO MEAS - AO V2 VTI: 33.9 CM
BH CV ECHO MEAS - ASC AORTA: 3.5 CM
BH CV ECHO MEAS - AVA(I,A): 2.5 CM^2
BH CV ECHO MEAS - AVA(I,D): 2.5 CM^2
BH CV ECHO MEAS - BSA(HAYCOCK): 2.2 M^2
BH CV ECHO MEAS - BSA: 2 M^2
BH CV ECHO MEAS - BZI_BMI: 41.4 KILOGRAMS/M^2
BH CV ECHO MEAS - BZI_METRIC_HEIGHT: 157 CM
BH CV ECHO MEAS - BZI_METRIC_WEIGHT: 102 KG
BH CV ECHO MEAS - EDV(CUBED): 57.1 ML
BH CV ECHO MEAS - EDV(MOD-SP2): 108 ML
BH CV ECHO MEAS - EDV(MOD-SP4): 112 ML
BH CV ECHO MEAS - EDV(TEICH): 63.9 ML
BH CV ECHO MEAS - EF(CUBED): 66.3 %
BH CV ECHO MEAS - EF(MOD-BP): 56.5 %
BH CV ECHO MEAS - EF(MOD-SP2): 55.8 %
BH CV ECHO MEAS - EF(MOD-SP4): 55.1 %
BH CV ECHO MEAS - EF(TEICH): 58.5 %
BH CV ECHO MEAS - ESV(CUBED): 19.2 ML
BH CV ECHO MEAS - ESV(MOD-SP2): 47.7 ML
BH CV ECHO MEAS - ESV(MOD-SP4): 50.3 ML
BH CV ECHO MEAS - ESV(TEICH): 26.5 ML
BH CV ECHO MEAS - FS: 30.4 %
BH CV ECHO MEAS - IVS/LVPW: 1.1
BH CV ECHO MEAS - IVSD: 0.94 CM
BH CV ECHO MEAS - LAT PEAK E' VEL: 7.8 CM/SEC
BH CV ECHO MEAS - LV DIASTOLIC VOL/BSA (35-75): 55.9 ML/M^2
BH CV ECHO MEAS - LV MASS(C)D: 106.1 GRAMS
BH CV ECHO MEAS - LV MASS(C)DI: 52.9 GRAMS/M^2
BH CV ECHO MEAS - LV MAX PG: 4.1 MMHG
BH CV ECHO MEAS - LV MEAN PG: 2 MMHG
BH CV ECHO MEAS - LV SYSTOLIC VOL/BSA (12-30): 25.1 ML/M^2
BH CV ECHO MEAS - LV V1 MAX: 101 CM/SEC
BH CV ECHO MEAS - LV V1 MEAN: 63.9 CM/SEC
BH CV ECHO MEAS - LV V1 VTI: 27 CM
BH CV ECHO MEAS - LVIDD: 3.9 CM
BH CV ECHO MEAS - LVIDS: 2.7 CM
BH CV ECHO MEAS - LVLD AP2: 7.1 CM
BH CV ECHO MEAS - LVLD AP4: 7.6 CM
BH CV ECHO MEAS - LVLS AP2: 6.6 CM
BH CV ECHO MEAS - LVLS AP4: 6.5 CM
BH CV ECHO MEAS - LVOT AREA (M): 3.1 CM^2
BH CV ECHO MEAS - LVOT AREA: 3.1 CM^2
BH CV ECHO MEAS - LVOT DIAM: 2 CM
BH CV ECHO MEAS - LVPWD: 0.9 CM
BH CV ECHO MEAS - MED PEAK E' VEL: 6.9 CM/SEC
BH CV ECHO MEAS - MV A DUR: 0.15 SEC
BH CV ECHO MEAS - MV A MAX VEL: 130 CM/SEC
BH CV ECHO MEAS - MV DEC SLOPE: 410 CM/SEC^2
BH CV ECHO MEAS - MV DEC TIME: 290 SEC
BH CV ECHO MEAS - MV E MAX VEL: 108 CM/SEC
BH CV ECHO MEAS - MV E/A: 0.83
BH CV ECHO MEAS - MV MEAN PG: 3 MMHG
BH CV ECHO MEAS - MV P1/2T MAX VEL: 130 CM/SEC
BH CV ECHO MEAS - MV P1/2T: 92.9 MSEC
BH CV ECHO MEAS - MV V2 MEAN: 75.5 CM/SEC
BH CV ECHO MEAS - MV V2 VTI: 51.7 CM
BH CV ECHO MEAS - MVA P1/2T LCG: 1.7 CM^2
BH CV ECHO MEAS - MVA(P1/2T): 2.4 CM^2
BH CV ECHO MEAS - MVA(VTI): 1.6 CM^2
BH CV ECHO MEAS - PA ACC TIME: 0.14 SEC
BH CV ECHO MEAS - PA MAX PG: 2.3 MMHG
BH CV ECHO MEAS - PA PR(ACCEL): 18 MMHG
BH CV ECHO MEAS - PA V2 MAX: 74.6 CM/SEC
BH CV ECHO MEAS - PULM A REVS VEL: 33.5 CM/SEC
BH CV ECHO MEAS - PULM DIAS VEL: 43.2 CM/SEC
BH CV ECHO MEAS - PULM S/D: 1.3
BH CV ECHO MEAS - PULM SYS VEL: 54.3 CM/SEC
BH CV ECHO MEAS - QP/QS: 0.47
BH CV ECHO MEAS - RAP SYSTOLE: 8 MMHG
BH CV ECHO MEAS - RV MEAN PG: 1 MMHG
BH CV ECHO MEAS - RV V1 MEAN: 37.2 CM/SEC
BH CV ECHO MEAS - RV V1 VTI: 15.8 CM
BH CV ECHO MEAS - RVOT AREA: 2.5 CM^2
BH CV ECHO MEAS - RVOT DIAM: 1.8 CM
BH CV ECHO MEAS - SI(AO): 144.4 ML/M^2
BH CV ECHO MEAS - SI(CUBED): 18.9 ML/M^2
BH CV ECHO MEAS - SI(LVOT): 42.3 ML/M^2
BH CV ECHO MEAS - SI(MOD-SP2): 30.1 ML/M^2
BH CV ECHO MEAS - SI(MOD-SP4): 30.8 ML/M^2
BH CV ECHO MEAS - SI(TEICH): 18.7 ML/M^2
BH CV ECHO MEAS - SUP REN AO DIAM: 2.1 CM
BH CV ECHO MEAS - SV(AO): 289.5 ML
BH CV ECHO MEAS - SV(CUBED): 37.8 ML
BH CV ECHO MEAS - SV(LVOT): 84.8 ML
BH CV ECHO MEAS - SV(MOD-SP2): 60.3 ML
BH CV ECHO MEAS - SV(MOD-SP4): 61.7 ML
BH CV ECHO MEAS - SV(RVOT): 40.2 ML
BH CV ECHO MEAS - SV(TEICH): 37.4 ML
BH CV ECHO MEAS - TAPSE (>1.6): 1.9 CM
BH CV ECHO MEASUREMENTS AVERAGE E/E' RATIO: 14.69
BH CV VAS BP LEFT ARM: NORMAL MMHG
BH CV XLRA - RV BASE: 3.3 CM
BH CV XLRA - RV LENGTH: 5.8 CM
BH CV XLRA - RV MID: 2.8 CM
BH CV XLRA - TDI S': 11.3 CM/SEC
LEFT ATRIUM VOLUME INDEX: 32.9 ML/M2
LV EF 2D ECHO EST: 57 %
MAXIMAL PREDICTED HEART RATE: 147 BPM
SINUS: 3.1 CM
STJ: 2.4 CM
STRESS TARGET HR: 125 BPM

## 2022-02-14 PROCEDURE — 93306 TTE W/DOPPLER COMPLETE: CPT | Performed by: INTERNAL MEDICINE

## 2022-02-14 PROCEDURE — 93306 TTE W/DOPPLER COMPLETE: CPT

## 2022-02-18 ENCOUNTER — TELEPHONE (OUTPATIENT)
Dept: CARDIOLOGY | Facility: CLINIC | Age: 72
End: 2022-02-18

## 2022-02-18 NOTE — TELEPHONE ENCOUNTER
Called and left a voicemail for Norma Ro. Will continue trying to contact the patient.    Thanks,   Kendra Serrano, PINAN, RN  Triage Nurse [facility] [place]

## 2022-02-18 NOTE — TELEPHONE ENCOUNTER
----- Message from LEWIS Lock sent at 2/17/2022  3:30 PM EST -----  Please call, echo normal for age.

## 2022-02-18 NOTE — TELEPHONE ENCOUNTER
Notified Norma Ro of results, patient verbalized understanding.     Thanks,   Kendra Serrano, PINAN, RN  Triage Nurse [facility] [place]

## 2022-02-28 ENCOUNTER — PRE-ADMISSION TESTING (OUTPATIENT)
Dept: PREADMISSION TESTING | Facility: HOSPITAL | Age: 72
End: 2022-02-28

## 2022-02-28 VITALS
SYSTOLIC BLOOD PRESSURE: 118 MMHG | BODY MASS INDEX: 43.43 KG/M2 | RESPIRATION RATE: 20 BRPM | HEIGHT: 62 IN | DIASTOLIC BLOOD PRESSURE: 64 MMHG | WEIGHT: 236 LBS | OXYGEN SATURATION: 93 % | HEART RATE: 70 BPM

## 2022-02-28 LAB
ANION GAP SERPL CALCULATED.3IONS-SCNC: 10.1 MMOL/L (ref 5–15)
BUN SERPL-MCNC: 37 MG/DL (ref 8–23)
BUN/CREAT SERPL: 30.1 (ref 7–25)
CALCIUM SPEC-SCNC: 8.8 MG/DL (ref 8.6–10.5)
CHLORIDE SERPL-SCNC: 100 MMOL/L (ref 98–107)
CO2 SERPL-SCNC: 25.9 MMOL/L (ref 22–29)
CREAT SERPL-MCNC: 1.23 MG/DL (ref 0.57–1)
DEPRECATED RDW RBC AUTO: 49.3 FL (ref 37–54)
EGFRCR SERPLBLD CKD-EPI 2021: 46.5 ML/MIN/1.73
ERYTHROCYTE [DISTWIDTH] IN BLOOD BY AUTOMATED COUNT: 13.6 % (ref 12.3–15.4)
GLUCOSE SERPL-MCNC: 174 MG/DL (ref 65–99)
HBA1C MFR BLD: 5.2 % (ref 4.8–5.6)
HCT VFR BLD AUTO: 40.7 % (ref 34–46.6)
HGB BLD-MCNC: 12.5 G/DL (ref 12–15.9)
MCH RBC QN AUTO: 30 PG (ref 26.6–33)
MCHC RBC AUTO-ENTMCNC: 30.7 G/DL (ref 31.5–35.7)
MCV RBC AUTO: 97.8 FL (ref 79–97)
PLATELET # BLD AUTO: 274 10*3/MM3 (ref 140–450)
PMV BLD AUTO: 10 FL (ref 6–12)
POTASSIUM SERPL-SCNC: 4.6 MMOL/L (ref 3.5–5.2)
RBC # BLD AUTO: 4.16 10*6/MM3 (ref 3.77–5.28)
SODIUM SERPL-SCNC: 136 MMOL/L (ref 136–145)
WBC NRBC COR # BLD: 5.76 10*3/MM3 (ref 3.4–10.8)

## 2022-02-28 PROCEDURE — 36415 COLL VENOUS BLD VENIPUNCTURE: CPT

## 2022-02-28 PROCEDURE — 85027 COMPLETE CBC AUTOMATED: CPT | Performed by: UROLOGY

## 2022-02-28 PROCEDURE — 83036 HEMOGLOBIN GLYCOSYLATED A1C: CPT | Performed by: UROLOGY

## 2022-02-28 PROCEDURE — 80048 BASIC METABOLIC PNL TOTAL CA: CPT | Performed by: UROLOGY

## 2022-02-28 RX ORDER — DULOXETIN HYDROCHLORIDE 30 MG/1
30 CAPSULE, DELAYED RELEASE ORAL 2 TIMES DAILY
COMMUNITY

## 2022-02-28 RX ORDER — TRAZODONE HYDROCHLORIDE 50 MG/1
50 TABLET ORAL AS NEEDED
COMMUNITY

## 2022-03-01 ENCOUNTER — TRANSCRIBE ORDERS (OUTPATIENT)
Dept: ADMINISTRATIVE | Facility: HOSPITAL | Age: 72
End: 2022-03-01

## 2022-03-01 DIAGNOSIS — Z01.812 PRE-OPERATIVE LABORATORY EXAMINATION: Primary | ICD-10-CM

## 2022-03-07 ENCOUNTER — TRANSCRIBE ORDERS (OUTPATIENT)
Dept: ADMINISTRATIVE | Facility: HOSPITAL | Age: 72
End: 2022-03-07

## 2022-03-07 ENCOUNTER — LAB (OUTPATIENT)
Dept: LAB | Facility: HOSPITAL | Age: 72
End: 2022-03-07

## 2022-03-07 DIAGNOSIS — I10 ESSENTIAL HYPERTENSION, MALIGNANT: ICD-10-CM

## 2022-03-07 DIAGNOSIS — N18.30 STAGE 3 CHRONIC KIDNEY DISEASE, UNSPECIFIED WHETHER STAGE 3A OR 3B CKD: ICD-10-CM

## 2022-03-07 DIAGNOSIS — N18.30 STAGE 3 CHRONIC KIDNEY DISEASE, UNSPECIFIED WHETHER STAGE 3A OR 3B CKD: Primary | ICD-10-CM

## 2022-03-07 DIAGNOSIS — Z01.812 PRE-OPERATIVE LABORATORY EXAMINATION: ICD-10-CM

## 2022-03-07 LAB
ALBUMIN SERPL-MCNC: 4.2 G/DL (ref 3.5–5.2)
ANION GAP SERPL CALCULATED.3IONS-SCNC: 10.5 MMOL/L (ref 5–15)
BACTERIA UR QL AUTO: ABNORMAL /HPF
BASOPHILS # BLD AUTO: 0.03 10*3/MM3 (ref 0–0.2)
BASOPHILS NFR BLD AUTO: 0.3 % (ref 0–1.5)
BILIRUB UR QL STRIP: NEGATIVE
BUN SERPL-MCNC: 25 MG/DL (ref 8–23)
BUN/CREAT SERPL: 23.8 (ref 7–25)
CALCIUM SPEC-SCNC: 9 MG/DL (ref 8.6–10.5)
CHLORIDE SERPL-SCNC: 105 MMOL/L (ref 98–107)
CLARITY UR: CLEAR
CO2 SERPL-SCNC: 24.5 MMOL/L (ref 22–29)
COLOR UR: YELLOW
CREAT SERPL-MCNC: 1.05 MG/DL (ref 0.57–1)
DEPRECATED RDW RBC AUTO: 43.2 FL (ref 37–54)
EGFRCR SERPLBLD CKD-EPI 2021: 56.2 ML/MIN/1.73
EOSINOPHIL # BLD AUTO: 0.19 10*3/MM3 (ref 0–0.4)
EOSINOPHIL NFR BLD AUTO: 2.1 % (ref 0.3–6.2)
ERYTHROCYTE [DISTWIDTH] IN BLOOD BY AUTOMATED COUNT: 12.9 % (ref 12.3–15.4)
GLUCOSE SERPL-MCNC: 97 MG/DL (ref 65–99)
GLUCOSE UR STRIP-MCNC: NEGATIVE MG/DL
HCT VFR BLD AUTO: 42.1 % (ref 34–46.6)
HGB BLD-MCNC: 13.7 G/DL (ref 12–15.9)
HGB UR QL STRIP.AUTO: NEGATIVE
HYALINE CASTS UR QL AUTO: ABNORMAL /LPF
IMM GRANULOCYTES # BLD AUTO: 0.04 10*3/MM3 (ref 0–0.05)
IMM GRANULOCYTES NFR BLD AUTO: 0.4 % (ref 0–0.5)
KETONES UR QL STRIP: NEGATIVE
LEUKOCYTE ESTERASE UR QL STRIP.AUTO: ABNORMAL
LYMPHOCYTES # BLD AUTO: 2.74 10*3/MM3 (ref 0.7–3.1)
LYMPHOCYTES NFR BLD AUTO: 30.8 % (ref 19.6–45.3)
MCH RBC QN AUTO: 30.1 PG (ref 26.6–33)
MCHC RBC AUTO-ENTMCNC: 32.5 G/DL (ref 31.5–35.7)
MCV RBC AUTO: 92.5 FL (ref 79–97)
MONOCYTES # BLD AUTO: 0.73 10*3/MM3 (ref 0.1–0.9)
MONOCYTES NFR BLD AUTO: 8.2 % (ref 5–12)
NEUTROPHILS NFR BLD AUTO: 5.18 10*3/MM3 (ref 1.7–7)
NEUTROPHILS NFR BLD AUTO: 58.2 % (ref 42.7–76)
NITRITE UR QL STRIP: NEGATIVE
NRBC BLD AUTO-RTO: 0 /100 WBC (ref 0–0.2)
PH UR STRIP.AUTO: 6.5 [PH] (ref 5–8)
PHOSPHATE SERPL-MCNC: 4.4 MG/DL (ref 2.5–4.5)
PLATELET # BLD AUTO: 326 10*3/MM3 (ref 140–450)
PMV BLD AUTO: 10.1 FL (ref 6–12)
POTASSIUM SERPL-SCNC: 5.3 MMOL/L (ref 3.5–5.2)
PROT UR QL STRIP: NEGATIVE
RBC # BLD AUTO: 4.55 10*6/MM3 (ref 3.77–5.28)
RBC # UR STRIP: ABNORMAL /HPF
REF LAB TEST METHOD: ABNORMAL
SARS-COV-2 RNA PNL SPEC NAA+PROBE: NOT DETECTED
SODIUM SERPL-SCNC: 140 MMOL/L (ref 136–145)
SP GR UR STRIP: 1.01 (ref 1–1.03)
SQUAMOUS #/AREA URNS HPF: ABNORMAL /HPF
UROBILINOGEN UR QL STRIP: ABNORMAL
WBC # UR STRIP: ABNORMAL /HPF
WBC NRBC COR # BLD: 8.91 10*3/MM3 (ref 3.4–10.8)

## 2022-03-07 PROCEDURE — 85025 COMPLETE CBC W/AUTO DIFF WBC: CPT

## 2022-03-07 PROCEDURE — 80069 RENAL FUNCTION PANEL: CPT

## 2022-03-07 PROCEDURE — 36415 COLL VENOUS BLD VENIPUNCTURE: CPT

## 2022-03-07 PROCEDURE — 81001 URINALYSIS AUTO W/SCOPE: CPT

## 2022-03-07 PROCEDURE — 87635 SARS-COV-2 COVID-19 AMP PRB: CPT | Performed by: UROLOGY

## 2022-03-07 PROCEDURE — C9803 HOPD COVID-19 SPEC COLLECT: HCPCS

## 2022-03-08 ENCOUNTER — ANESTHESIA EVENT (OUTPATIENT)
Dept: PERIOP | Facility: HOSPITAL | Age: 72
End: 2022-03-08

## 2022-03-08 NOTE — ANESTHESIA PREPROCEDURE EVALUATION
Anesthesia Evaluation     Patient summary reviewed and Nursing notes reviewed   no history of anesthetic complications:  NPO Solid Status: > 8 hours  NPO Liquid Status: > 2 hours           Airway   Mallampati: II  TM distance: >3 FB  Neck ROM: full  No difficulty expected  Dental    (+) edentulous    Pulmonary - negative pulmonary ROS    breath sounds clear to auscultation  Cardiovascular   Exercise tolerance: good (4-7 METS)    ECG reviewed  Rhythm: regular  Rate: normal    (+) hypertension well controlled less than 2 medications, dysrhythmias Bradycardia,     ROS comment: Narrative    Gianna Nayak APRN     1/5/2022 11:51 AM     ECG 12 Lead     Date/Time: 1/5/2022 11:46 AM   Performed by: Gianna Nayak APRN   Authorized by: Gianna Nayak APRN   Comparison: compared with previous ECG from 12/29/2020   Similar to previous ECG   Rhythm: sinus rhythm   Rate: normal   Conduction: conduction normal   ST Segments: ST segments normal   T Waves: T waves normal   QRS axis: normal     Clinical impression: normal ECG     Interpretation Summary    · Estimated left ventricular EF = 57% Left ventricular systolic function is normal.  · Left ventricular diastolic function is consistent with (grade I) impaired relaxation.  · Moderate mitral annular calcification is present.        Neuro/Psych  (+) dizziness/light headedness, numbness (left LE ), psychiatric history Depression,    GI/Hepatic/Renal/Endo    (+) morbid obesity, GERD,  renal disease CRI, thyroid problem (thyroid and parathyroid surgery) hypothyroidism    ROS Comment: Gastric bypass 1976 revision in 1980  Several hernia repairs     Musculoskeletal     (+) back pain (fusion L2-5 ), chronic pain, neck pain, neck stiffness,   Abdominal   (+) obese,    Substance History      OB/GYN          Other   arthritis,      ROS/Med Hx Other: Stimulator on left side for neurogenic bladder                 Anesthesia Plan    ASA 3     general with block     intravenous induction      Anesthetic plan, all risks, benefits, and alternatives have been provided, discussed and informed consent has been obtained with: patient.  Use of blood products discussed with patient  Consented to blood products.       CODE STATUS:

## 2022-03-09 ENCOUNTER — ANESTHESIA (OUTPATIENT)
Dept: PERIOP | Facility: HOSPITAL | Age: 72
End: 2022-03-09

## 2022-03-09 ENCOUNTER — HOSPITAL ENCOUNTER (OUTPATIENT)
Facility: HOSPITAL | Age: 72
Discharge: HOME | End: 2022-03-10
Attending: UROLOGY | Admitting: UROLOGY

## 2022-03-09 DIAGNOSIS — N28.89 RENAL MASS, LEFT: ICD-10-CM

## 2022-03-09 DIAGNOSIS — N28.89 LEFT RENAL MASS: Primary | ICD-10-CM

## 2022-03-09 PROBLEM — F32.5 MAJOR DEPRESSIVE DISORDER WITH SINGLE EPISODE, IN REMISSION: Status: ACTIVE | Noted: 2021-01-01

## 2022-03-09 PROBLEM — Z79.891 LONG TERM (CURRENT) USE OF OPIATE ANALGESIC: Status: ACTIVE | Noted: 2021-01-01

## 2022-03-09 PROBLEM — N18.30 CHRONIC KIDNEY DISEASE, STAGE 3 UNSPECIFIED: Status: ACTIVE | Noted: 2021-01-01

## 2022-03-09 PROBLEM — Z96.641 PRESENCE OF RIGHT ARTIFICIAL HIP JOINT: Status: ACTIVE | Noted: 2021-07-27

## 2022-03-09 PROBLEM — M16.11 UNILATERAL PRIMARY OSTEOARTHRITIS, RIGHT HIP: Status: ACTIVE | Noted: 2021-07-27

## 2022-03-09 PROBLEM — K21.9 GASTRO-ESOPHAGEAL REFLUX DISEASE WITHOUT ESOPHAGITIS: Status: ACTIVE | Noted: 2021-01-01

## 2022-03-09 PROBLEM — I12.9 HYPERTENSIVE CHRONIC KIDNEY DISEASE W STG 1-4/UNSP CHR KDNY: Status: ACTIVE | Noted: 2021-01-01

## 2022-03-09 LAB
ABO GROUP BLD: NORMAL
BLD GP AB SCN SERPL QL: NEGATIVE
RH BLD: POSITIVE
T&S EXPIRATION DATE: NORMAL

## 2022-03-09 PROCEDURE — A9270 NON-COVERED ITEM OR SERVICE: HCPCS | Performed by: UROLOGY

## 2022-03-09 PROCEDURE — 25010000002 SUCCINYLCHOLINE PER 20 MG: Performed by: NURSE ANESTHETIST, CERTIFIED REGISTERED

## 2022-03-09 PROCEDURE — 25010000002 MIDAZOLAM PER 1MG: Performed by: NURSE ANESTHETIST, CERTIFIED REGISTERED

## 2022-03-09 PROCEDURE — 86901 BLOOD TYPING SEROLOGIC RH(D): CPT | Performed by: REGISTERED NURSE

## 2022-03-09 PROCEDURE — 25010000002 PROPOFOL 10 MG/ML EMULSION: Performed by: NURSE ANESTHETIST, CERTIFIED REGISTERED

## 2022-03-09 PROCEDURE — 25010000002 NEOSTIGMINE 10 MG/10ML SOLUTION: Performed by: NURSE ANESTHETIST, CERTIFIED REGISTERED

## 2022-03-09 PROCEDURE — 88341 IMHCHEM/IMCYTCHM EA ADD ANTB: CPT | Performed by: UROLOGY

## 2022-03-09 PROCEDURE — 88342 IMHCHEM/IMCYTCHM 1ST ANTB: CPT | Performed by: UROLOGY

## 2022-03-09 PROCEDURE — 0 CEFAZOLIN SODIUM-DEXTROSE 2-3 GM-%(50ML) RECONSTITUTED SOLUTION: Performed by: UROLOGY

## 2022-03-09 PROCEDURE — 88307 TISSUE EXAM BY PATHOLOGIST: CPT | Performed by: UROLOGY

## 2022-03-09 PROCEDURE — G0378 HOSPITAL OBSERVATION PER HR: HCPCS

## 2022-03-09 PROCEDURE — 63710000001 TRAZODONE 50 MG TABLET: Performed by: UROLOGY

## 2022-03-09 PROCEDURE — C1889 IMPLANT/INSERT DEVICE, NOC: HCPCS | Performed by: UROLOGY

## 2022-03-09 PROCEDURE — 63710000001 DULOXETINE 30 MG CAPSULE DELAYED-RELEASE PARTICLES: Performed by: UROLOGY

## 2022-03-09 PROCEDURE — 86850 RBC ANTIBODY SCREEN: CPT | Performed by: REGISTERED NURSE

## 2022-03-09 PROCEDURE — 0 MEPERIDINE PER 100 MG: Performed by: UROLOGY

## 2022-03-09 PROCEDURE — 25010000002 DEXAMETHASONE PER 1 MG: Performed by: NURSE ANESTHETIST, CERTIFIED REGISTERED

## 2022-03-09 PROCEDURE — 88305 TISSUE EXAM BY PATHOLOGIST: CPT | Performed by: UROLOGY

## 2022-03-09 PROCEDURE — 25010000002 ONDANSETRON PER 1 MG: Performed by: NURSE ANESTHETIST, CERTIFIED REGISTERED

## 2022-03-09 PROCEDURE — 25010000002 FENTANYL CITRATE (PF) 50 MCG/ML SOLUTION: Performed by: NURSE ANESTHETIST, CERTIFIED REGISTERED

## 2022-03-09 PROCEDURE — 63710000001 GABAPENTIN 300 MG CAPSULE: Performed by: UROLOGY

## 2022-03-09 PROCEDURE — 63710000001 OXYCODONE-ACETAMINOPHEN 7.5-325 MG TABLET: Performed by: UROLOGY

## 2022-03-09 PROCEDURE — 86900 BLOOD TYPING SEROLOGIC ABO: CPT | Performed by: REGISTERED NURSE

## 2022-03-09 DEVICE — FLOSEAL HEMOSTATIC MATRIX, 5ML
Type: IMPLANTABLE DEVICE | Site: KIDNEY | Status: FUNCTIONAL
Brand: FLOSEAL HEMOSTATIC MATRIX

## 2022-03-09 RX ORDER — MAGNESIUM HYDROXIDE 1200 MG/15ML
LIQUID ORAL AS NEEDED
Status: DISCONTINUED | OUTPATIENT
Start: 2022-03-09 | End: 2022-03-09 | Stop reason: HOSPADM

## 2022-03-09 RX ORDER — TRAZODONE HYDROCHLORIDE 50 MG/1
50 TABLET ORAL AS NEEDED
Status: DISCONTINUED | OUTPATIENT
Start: 2022-03-09 | End: 2022-03-10 | Stop reason: HOSPADM

## 2022-03-09 RX ORDER — ONDANSETRON 2 MG/ML
4 INJECTION INTRAMUSCULAR; INTRAVENOUS ONCE AS NEEDED
Status: COMPLETED | OUTPATIENT
Start: 2022-03-09 | End: 2022-03-09

## 2022-03-09 RX ORDER — OXYCODONE AND ACETAMINOPHEN 7.5; 325 MG/1; MG/1
1 TABLET ORAL EVERY 4 HOURS PRN
Status: DISCONTINUED | OUTPATIENT
Start: 2022-03-09 | End: 2022-03-10 | Stop reason: HOSPADM

## 2022-03-09 RX ORDER — DEXAMETHASONE SODIUM PHOSPHATE 4 MG/ML
8 INJECTION, SOLUTION INTRA-ARTICULAR; INTRALESIONAL; INTRAMUSCULAR; INTRAVENOUS; SOFT TISSUE ONCE AS NEEDED
Status: COMPLETED | OUTPATIENT
Start: 2022-03-09 | End: 2022-03-09

## 2022-03-09 RX ORDER — FENTANYL CITRATE 50 UG/ML
25 INJECTION, SOLUTION INTRAMUSCULAR; INTRAVENOUS
Status: DISCONTINUED | OUTPATIENT
Start: 2022-03-09 | End: 2022-03-09 | Stop reason: HOSPADM

## 2022-03-09 RX ORDER — DULOXETIN HYDROCHLORIDE 30 MG/1
30 CAPSULE, DELAYED RELEASE ORAL 2 TIMES DAILY
Status: DISCONTINUED | OUTPATIENT
Start: 2022-03-09 | End: 2022-03-10 | Stop reason: HOSPADM

## 2022-03-09 RX ORDER — SUCCINYLCHOLINE CHLORIDE 20 MG/ML
INJECTION INTRAMUSCULAR; INTRAVENOUS AS NEEDED
Status: DISCONTINUED | OUTPATIENT
Start: 2022-03-09 | End: 2022-03-09 | Stop reason: SURG

## 2022-03-09 RX ORDER — SODIUM CHLORIDE, SODIUM LACTATE, POTASSIUM CHLORIDE, CALCIUM CHLORIDE 600; 310; 30; 20 MG/100ML; MG/100ML; MG/100ML; MG/100ML
9 INJECTION, SOLUTION INTRAVENOUS CONTINUOUS
Status: DISCONTINUED | OUTPATIENT
Start: 2022-03-09 | End: 2022-03-09

## 2022-03-09 RX ORDER — ONDANSETRON 2 MG/ML
4 INJECTION INTRAMUSCULAR; INTRAVENOUS ONCE AS NEEDED
Status: DISCONTINUED | OUTPATIENT
Start: 2022-03-09 | End: 2022-03-09 | Stop reason: HOSPADM

## 2022-03-09 RX ORDER — MEPERIDINE HYDROCHLORIDE 25 MG/ML
50 INJECTION INTRAMUSCULAR; INTRAVENOUS; SUBCUTANEOUS EVERY 6 HOURS PRN
Status: DISCONTINUED | OUTPATIENT
Start: 2022-03-09 | End: 2022-03-10 | Stop reason: HOSPADM

## 2022-03-09 RX ORDER — LISINOPRIL 5 MG/1
5 TABLET ORAL DAILY
Status: DISCONTINUED | OUTPATIENT
Start: 2022-03-09 | End: 2022-03-10 | Stop reason: HOSPADM

## 2022-03-09 RX ORDER — ONDANSETRON 4 MG/1
4 TABLET, FILM COATED ORAL EVERY 6 HOURS PRN
Status: DISCONTINUED | OUTPATIENT
Start: 2022-03-09 | End: 2022-03-10 | Stop reason: HOSPADM

## 2022-03-09 RX ORDER — MIDAZOLAM HYDROCHLORIDE 2 MG/2ML
0.5 INJECTION, SOLUTION INTRAMUSCULAR; INTRAVENOUS
Status: DISCONTINUED | OUTPATIENT
Start: 2022-03-09 | End: 2022-03-09 | Stop reason: HOSPADM

## 2022-03-09 RX ORDER — LIDOCAINE HYDROCHLORIDE 10 MG/ML
0.5 INJECTION, SOLUTION EPIDURAL; INFILTRATION; INTRACAUDAL; PERINEURAL ONCE AS NEEDED
Status: DISCONTINUED | OUTPATIENT
Start: 2022-03-09 | End: 2022-03-09 | Stop reason: HOSPADM

## 2022-03-09 RX ORDER — BUPIVACAINE HYDROCHLORIDE 2.5 MG/ML
INJECTION, SOLUTION EPIDURAL; INFILTRATION; INTRACAUDAL AS NEEDED
Status: DISCONTINUED | OUTPATIENT
Start: 2022-03-09 | End: 2022-03-09 | Stop reason: HOSPADM

## 2022-03-09 RX ORDER — ONDANSETRON 2 MG/ML
4 INJECTION INTRAMUSCULAR; INTRAVENOUS EVERY 6 HOURS PRN
Status: DISCONTINUED | OUTPATIENT
Start: 2022-03-09 | End: 2022-03-10 | Stop reason: HOSPADM

## 2022-03-09 RX ORDER — NEOSTIGMINE METHYLSULFATE 1 MG/ML
INJECTION, SOLUTION INTRAVENOUS AS NEEDED
Status: DISCONTINUED | OUTPATIENT
Start: 2022-03-09 | End: 2022-03-09 | Stop reason: SURG

## 2022-03-09 RX ORDER — PROPOFOL 10 MG/ML
VIAL (ML) INTRAVENOUS AS NEEDED
Status: DISCONTINUED | OUTPATIENT
Start: 2022-03-09 | End: 2022-03-09 | Stop reason: SURG

## 2022-03-09 RX ORDER — LEVOTHYROXINE SODIUM 0.12 MG/1
125 TABLET ORAL EVERY MORNING
Status: DISCONTINUED | OUTPATIENT
Start: 2022-03-09 | End: 2022-03-10 | Stop reason: HOSPADM

## 2022-03-09 RX ORDER — CEFAZOLIN SODIUM 2 G/50ML
2 SOLUTION INTRAVENOUS EVERY 8 HOURS
Status: COMPLETED | OUTPATIENT
Start: 2022-03-09 | End: 2022-03-10

## 2022-03-09 RX ORDER — FENTANYL CITRATE 50 UG/ML
50 INJECTION, SOLUTION INTRAMUSCULAR; INTRAVENOUS
Status: DISCONTINUED | OUTPATIENT
Start: 2022-03-09 | End: 2022-03-09 | Stop reason: HOSPADM

## 2022-03-09 RX ORDER — GABAPENTIN 300 MG/1
300 CAPSULE ORAL EVERY 8 HOURS SCHEDULED
Status: DISCONTINUED | OUTPATIENT
Start: 2022-03-09 | End: 2022-03-10 | Stop reason: HOSPADM

## 2022-03-09 RX ORDER — CEFAZOLIN SODIUM 2 G/50ML
2 SOLUTION INTRAVENOUS ONCE
Status: COMPLETED | OUTPATIENT
Start: 2022-03-09 | End: 2022-03-09

## 2022-03-09 RX ORDER — FAMOTIDINE 20 MG/1
20 TABLET, FILM COATED ORAL DAILY
Status: DISCONTINUED | OUTPATIENT
Start: 2022-03-09 | End: 2022-03-10 | Stop reason: HOSPADM

## 2022-03-09 RX ORDER — SODIUM CHLORIDE 9 MG/ML
40 INJECTION, SOLUTION INTRAVENOUS AS NEEDED
Status: DISCONTINUED | OUTPATIENT
Start: 2022-03-09 | End: 2022-03-09 | Stop reason: HOSPADM

## 2022-03-09 RX ORDER — BUPIVACAINE HYDROCHLORIDE 5 MG/ML
INJECTION, SOLUTION EPIDURAL; INTRACAUDAL
Status: COMPLETED | OUTPATIENT
Start: 2022-03-09 | End: 2022-03-09

## 2022-03-09 RX ORDER — OXYCODONE AND ACETAMINOPHEN 7.5; 325 MG/1; MG/1
1 TABLET ORAL EVERY 4 HOURS PRN
Qty: 20 TABLET | Refills: 0 | Status: SHIPPED | OUTPATIENT
Start: 2022-03-09 | End: 2023-03-09

## 2022-03-09 RX ORDER — ROCURONIUM BROMIDE 10 MG/ML
INJECTION, SOLUTION INTRAVENOUS AS NEEDED
Status: DISCONTINUED | OUTPATIENT
Start: 2022-03-09 | End: 2022-03-09 | Stop reason: SURG

## 2022-03-09 RX ORDER — LIDOCAINE HYDROCHLORIDE 20 MG/ML
INJECTION, SOLUTION INFILTRATION; PERINEURAL AS NEEDED
Status: DISCONTINUED | OUTPATIENT
Start: 2022-03-09 | End: 2022-03-09 | Stop reason: SURG

## 2022-03-09 RX ORDER — SODIUM CHLORIDE 9 MG/ML
100 INJECTION, SOLUTION INTRAVENOUS CONTINUOUS
Status: DISCONTINUED | OUTPATIENT
Start: 2022-03-09 | End: 2022-03-10 | Stop reason: HOSPADM

## 2022-03-09 RX ORDER — FAMOTIDINE 10 MG/ML
20 INJECTION, SOLUTION INTRAVENOUS
Status: COMPLETED | OUTPATIENT
Start: 2022-03-09 | End: 2022-03-09

## 2022-03-09 RX ORDER — GLYCOPYRROLATE 0.2 MG/ML
INJECTION INTRAMUSCULAR; INTRAVENOUS AS NEEDED
Status: DISCONTINUED | OUTPATIENT
Start: 2022-03-09 | End: 2022-03-09 | Stop reason: SURG

## 2022-03-09 RX ORDER — CALCITRIOL 0.25 UG/1
0.5 CAPSULE, LIQUID FILLED ORAL DAILY
Status: DISCONTINUED | OUTPATIENT
Start: 2022-03-09 | End: 2022-03-10 | Stop reason: HOSPADM

## 2022-03-09 RX ORDER — EPHEDRINE SULFATE 50 MG/ML
INJECTION, SOLUTION INTRAVENOUS AS NEEDED
Status: DISCONTINUED | OUTPATIENT
Start: 2022-03-09 | End: 2022-03-09 | Stop reason: SURG

## 2022-03-09 RX ORDER — SODIUM CHLORIDE 0.9 % (FLUSH) 0.9 %
10 SYRINGE (ML) INJECTION AS NEEDED
Status: DISCONTINUED | OUTPATIENT
Start: 2022-03-09 | End: 2022-03-09 | Stop reason: HOSPADM

## 2022-03-09 RX ORDER — SODIUM CHLORIDE 0.9 % (FLUSH) 0.9 %
10 SYRINGE (ML) INJECTION EVERY 12 HOURS SCHEDULED
Status: DISCONTINUED | OUTPATIENT
Start: 2022-03-09 | End: 2022-03-09 | Stop reason: HOSPADM

## 2022-03-09 RX ORDER — SODIUM CHLORIDE, SODIUM LACTATE, POTASSIUM CHLORIDE, CALCIUM CHLORIDE 600; 310; 30; 20 MG/100ML; MG/100ML; MG/100ML; MG/100ML
100 INJECTION, SOLUTION INTRAVENOUS CONTINUOUS
Status: DISCONTINUED | OUTPATIENT
Start: 2022-03-09 | End: 2022-03-09

## 2022-03-09 RX ADMIN — EPHEDRINE SULFATE 10 MG: 50 INJECTION, SOLUTION INTRAVENOUS at 08:12

## 2022-03-09 RX ADMIN — GABAPENTIN 300 MG: 300 CAPSULE ORAL at 16:46

## 2022-03-09 RX ADMIN — GLYCOPYRROLATE 0.1 MG: 0.2 INJECTION INTRAMUSCULAR; INTRAVENOUS at 08:43

## 2022-03-09 RX ADMIN — OXYCODONE AND ACETAMINOPHEN 1 TABLET: 7.5; 325 TABLET ORAL at 20:41

## 2022-03-09 RX ADMIN — CEFAZOLIN SODIUM 2 G: 2 SOLUTION INTRAVENOUS at 23:38

## 2022-03-09 RX ADMIN — BUPIVACAINE HYDROCHLORIDE 15 ML: 5 INJECTION, SOLUTION EPIDURAL; INTRACAUDAL; PERINEURAL at 08:15

## 2022-03-09 RX ADMIN — SODIUM CHLORIDE 100 ML/HR: 9 INJECTION, SOLUTION INTRAVENOUS at 23:24

## 2022-03-09 RX ADMIN — CEFAZOLIN SODIUM 2 G: 2 SOLUTION INTRAVENOUS at 16:46

## 2022-03-09 RX ADMIN — EPHEDRINE SULFATE 10 MG: 50 INJECTION, SOLUTION INTRAVENOUS at 08:33

## 2022-03-09 RX ADMIN — GABAPENTIN 300 MG: 300 CAPSULE ORAL at 21:35

## 2022-03-09 RX ADMIN — ROCURONIUM BROMIDE 15 MG: 10 INJECTION INTRAVENOUS at 08:30

## 2022-03-09 RX ADMIN — MIDAZOLAM HYDROCHLORIDE 0.5 MG: 1 INJECTION, SOLUTION INTRAMUSCULAR; INTRAVENOUS at 07:56

## 2022-03-09 RX ADMIN — SODIUM CHLORIDE, POTASSIUM CHLORIDE, SODIUM LACTATE AND CALCIUM CHLORIDE 100 ML/HR: 600; 310; 30; 20 INJECTION, SOLUTION INTRAVENOUS at 11:31

## 2022-03-09 RX ADMIN — CEFAZOLIN SODIUM 2 G: 2 SOLUTION INTRAVENOUS at 08:24

## 2022-03-09 RX ADMIN — BUPIVACAINE HYDROCHLORIDE 15 ML: 5 INJECTION, SOLUTION EPIDURAL; INTRACAUDAL at 08:18

## 2022-03-09 RX ADMIN — DULOXETINE HYDROCHLORIDE 30 MG: 30 CAPSULE, DELAYED RELEASE ORAL at 21:35

## 2022-03-09 RX ADMIN — GLYCOPYRROLATE 0.6 MG: 0.2 INJECTION INTRAMUSCULAR; INTRAVENOUS at 09:16

## 2022-03-09 RX ADMIN — PROPOFOL 50 MG: 10 INJECTION, EMULSION INTRAVENOUS at 08:14

## 2022-03-09 RX ADMIN — MEPERIDINE HYDROCHLORIDE 50 MG: 25 INJECTION INTRAMUSCULAR; INTRAVENOUS; SUBCUTANEOUS at 11:55

## 2022-03-09 RX ADMIN — DEXAMETHASONE SODIUM PHOSPHATE 8 MG: 4 INJECTION, SOLUTION INTRAMUSCULAR; INTRAVENOUS at 07:42

## 2022-03-09 RX ADMIN — FENTANYL CITRATE 50 MCG: 0.05 INJECTION, SOLUTION INTRAMUSCULAR; INTRAVENOUS at 09:59

## 2022-03-09 RX ADMIN — ONDANSETRON 4 MG: 2 INJECTION INTRAMUSCULAR; INTRAVENOUS at 07:43

## 2022-03-09 RX ADMIN — TRAZODONE HYDROCHLORIDE 50 MG: 50 TABLET ORAL at 21:36

## 2022-03-09 RX ADMIN — NEOSTIGMINE METHYLSULFATE 3 MG: 1 INJECTION INTRAVENOUS at 09:16

## 2022-03-09 RX ADMIN — EPHEDRINE SULFATE 10 MG: 50 INJECTION, SOLUTION INTRAVENOUS at 08:37

## 2022-03-09 RX ADMIN — MEPERIDINE HYDROCHLORIDE 50 MG: 25 INJECTION INTRAMUSCULAR; INTRAVENOUS; SUBCUTANEOUS at 23:37

## 2022-03-09 RX ADMIN — SODIUM CHLORIDE, POTASSIUM CHLORIDE, SODIUM LACTATE AND CALCIUM CHLORIDE 9 ML/HR: 600; 310; 30; 20 INJECTION, SOLUTION INTRAVENOUS at 07:13

## 2022-03-09 RX ADMIN — FENTANYL CITRATE 50 MCG: 0.05 INJECTION, SOLUTION INTRAMUSCULAR; INTRAVENOUS at 10:09

## 2022-03-09 RX ADMIN — LIDOCAINE HYDROCHLORIDE 100 MG: 20 INJECTION, SOLUTION INFILTRATION; PERINEURAL at 08:05

## 2022-03-09 RX ADMIN — EPHEDRINE SULFATE 10 MG: 50 INJECTION, SOLUTION INTRAVENOUS at 09:15

## 2022-03-09 RX ADMIN — SUCCINYLCHOLINE CHLORIDE 200 MG: 20 INJECTION, SOLUTION INTRAMUSCULAR; INTRAVENOUS at 08:14

## 2022-03-09 RX ADMIN — EPHEDRINE SULFATE 10 MG: 50 INJECTION, SOLUTION INTRAVENOUS at 09:18

## 2022-03-09 RX ADMIN — PROPOFOL 100 MG: 10 INJECTION, EMULSION INTRAVENOUS at 08:13

## 2022-03-09 RX ADMIN — FAMOTIDINE 20 MG: 10 INJECTION, SOLUTION INTRAVENOUS at 07:43

## 2022-03-09 NOTE — OP NOTE
NEPHRECTOMY PARTIAL LAPAROSCOPIC  Procedure Note    Norma Ro  3/9/2022    Pre-op Diagnosis:   Left renal mass    Post-op Diagnosis:     Post-Op Diagnosis Codes:     * Renal mass, left [N28.89]    Procedure(s):  NEPHRECTOMY PARTIAL LAPAROSCOPIC    Surgeon(s):  Yves Hawley MD    Anesthesia: General    Staff:   Circulator: Kendra Ayala RN  Scrub Person: Sammy Herzogt N  Assistant: Gisela Candelaria CSA    Estimated Blood Loss: 100ml    Specimens:                Order Name Source Comment Collection Info Order Time   TYPE AND SCREEN   Collected By: Jayashree Baeza 3/9/2022  6:43 AM     Release to patient   Immediate        TISSUE PATHOLOGY EXAM Kidney, Left  Collected By: Yves Hawley MD 3/9/2022  8:56 AM     Release to patient   Immediate              Drains:   Closed/Suction Drain Left LUQ Bulb 15 Fr. (Active)       Urethral Catheter Double-lumen;Non-latex 16 Fr. (Active)       Findings: Solid left renal mass measuring less than 2 cm adjacent to a large anterior upper pole renal cyst.  Small posterior upper pole renal cyst as well removed.    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  Placed in a comfortable supine position where May catheter was placed.  She was then flipped over to the flank position left side up right-side-down.  Secured to the table.  Axillary roll placed.  All areas were properly padded.  She was prepped and draped in a sterile fashion.  Surgical timeout was performed.  Small incision was made off the tip of the 12th rib and the retroperitoneal space was entered.  Balloon dissection was then performed.  4 trocar technique was utilized.  A 5 mm at the tip the 11th rib a 5 mm at the posterior axial line above the 12th rib a 12 mm trocar at the anterior axillary and and a  balloon trocar at the tip of the 12th rib.  Pneumoretroperitoneum was achieved.  We began by dissecting out the retroperitoneal space opened up Gerota's fascia and began developing the space around the kidney cleaning off the perirenal fat.  We immediately identified the anterior upper pole renal cyst.  We identified the posterior upper pole renal cyst.  The cyst was taken down and the cyst wall sent off to pathology.  Looked benign.  The larger cyst was then taken down with the wall removed and the edges cauterized and sent off to pathology.  Now we can identify the renal mass much more clearly and I using the harmonic scalpel and the mass was cut out of the kidney.  Hemostasis was achieved with point electrocautery.  FloSeal was placed in the defect.  Bleeding was minimal.  Kidney looks healthy.  No other identifiable lesions are seen.  We placed a 15 Chinese Sim drain through the 11th rib trocar site and secured with 2-0 silk.  The specimen was placed in a laparoscopic retrieval bag and pulled out through our larger trocar site and all the other trochars were removed.  The 2 larger incisions were then closed with figure-of-eight 0 Vicryl and the skin was closed with 4-0 Vicryl subcuticular.  Skin glue was applied.  She had received a tap block as well as Marcaine in the wounds.  She was awoken taken recovery stable condition.  Table slightly flexed.      Yves Hawley MD     Date: 3/9/2022  Time: 09:34 EST

## 2022-03-09 NOTE — ANESTHESIA POSTPROCEDURE EVALUATION
Patient: Norma Ro    Procedure Summary     Date: 03/09/22 Room / Location:  [facility] OR 2 /  [facility]    Anesthesia Start: 0800 Anesthesia Stop: 0938    Procedure: NEPHRECTOMY PARTIAL LAPAROSCOPIC (Left Abdomen) Diagnosis:       Renal mass, left      (Left renal mass)    Surgeons: Yves Hawley MD Provider: Dori Holland CRNA    Anesthesia Type: general with block ASA Status: 3          Anesthesia Type: general with block    Vitals  Vitals Value Taken Time   /63 03/09/22 1030   Temp 98 °F (36.7 °C) 03/09/22 0935   Pulse 69 03/09/22 1035   Resp 14 03/09/22 1025   SpO2 98 % 03/09/22 1035   Vitals shown include unvalidated device data.        Post Anesthesia Care and Evaluation    Patient location during evaluation: PACU  Patient participation: complete - patient participated  Level of consciousness: awake and alert  Pain score: 3  Pain management: satisfactory to patient  Airway patency: patent  Anesthetic complications: No anesthetic complications  PONV Status: none  Cardiovascular status: acceptable  Respiratory status: acceptable  Hydration status: acceptable

## 2022-03-09 NOTE — H&P
[facility] Surgical History and Physical    Patient Care Team:  Chey Ledezma MD as PCP - General (Family Medicine)  Antione Ha MD as Consulting Physician (Vascular Surgery)  Mir Martinez MD as Consulting Physician (Cardiology)  Marcelino Levine MD as Consulting Physician (Nephrology)  Mazin Ortiz MD as Consulting Physician (Endocrinology)  Gianna Knowles MD as Surgeon (General Surgery)  Refugio Cannon MD (Orthopedic Surgery)    Chief complaint left renal mass    Subjective     Patient is a 71 y.o. female presents with 15mm left renal mass fpr partial nephrectomy.     Review of Systems   Pertinent items are noted in HPI    Past Medical History:   Diagnosis Date   • Agatston CAC score, <100     2017: 47   • Anemia    • Arthritis    • Broken bones    • Chronic kidney disease    • Depression    • Depression    • False positive stress test     2017 -- coronary artery CTA showed no significant CAD   • GERD (gastroesophageal reflux disease)    • Headache     occ   • Hearing loss     asim hearing aids   • History of blood clots     pt states no   • History of cardiovascular stress test     normal Cardiolite 9/2016   • Hypertension    • Hyperthyroidism    • Hypoglycemia     Rebound hypoglycemia   • Hypothyroid    • Hypothyroidism    • Incisional hernia     s/p surgical repair, 2014   • Incontinence in female    • Lower back pain    • Morbid obesity (HCC)     s/p remote gastric bypass   • Osteoarthritis    • Sinus bradycardia    • UTI (urinary tract infection)    • Varicose veins      Past Surgical History:   Procedure Laterality Date   • ANKLE ARTHRODESIS Right 01/23/2019    Right open ankle arthrodesis, harvest of proximal tibial bone graft as major graft, exostectomy, medial great toe-Dr. Santy Romero   • BACK SURGERY     • BLADDER SUSPENSION     • CARPAL TUNNEL RELEASE Left    • CHOLECYSTECTOMY     • COLONOSCOPY N/A 11/3/2017    Procedure: COLONOSCOPY; polypectomy;   Surgeon: Chevy Zuniga MD;  Location:  [facility];  Service:    • COLONOSCOPY N/A 03/25/2007    Normal-Dr. Timoteo Prado   • EXTERNAL FIXATOR APPLICATION     • EYE SURGERY  08/26/2020    Dr. Avalos   • GASTRIC BYPASS      1980s   • HERNIA REPAIR  2014   • LUMBAR FUSION Bilateral 02/04/2021   • ORIF TIBIA/FIBULA FRACTURES Right 2005   • PARATHYROIDECTOMY N/A 11/20/2019    Procedure: left inferior parathyroid mediastinal resection and right inferior parathyroid resection, thyroid mass resection;  Surgeon: Gianna Knowles MD;  Location: [facility] MAIN OR;  Service: General   • TOTAL HIP ARTHROPLASTY Right 07/2021   • UPPER GASTROINTESTINAL ENDOSCOPY N/A 03/25/2007    No gross lesions in the esophagus, non-bleeding erythematous gastropathy, antrectomy with a small gastric pouch remnant, Billroth II anatomy with a gastrojejunostomy-Dr. Timoteo Prado     Family History   Problem Relation Age of Onset   • Aortic aneurysm Father         abdominal   • Heart disease Father    • Hypertension Brother    • Stroke Brother    • Intracerebral hemorrhage Brother    • Heart disease Brother    • Heart disease Maternal Grandfather    • Stroke Paternal Grandmother    • Heart disease Paternal Grandfather    • Diabetes Mother    • Heart disease Mother    • Malig Hyperthermia Neg Hx      Social History     Tobacco Use   • Smoking status: Never Smoker   • Smokeless tobacco: Never Used   • Tobacco comment: secondary smoke   Vaping Use   • Vaping Use: Never used   Substance Use Topics   • Alcohol use: No     Comment: very seldom   • Drug use: No       Meds:  Medications Prior to Admission   Medication Sig Dispense Refill Last Dose   • Acetaminophen (TYLENOL ARTHRITIS PAIN PO) Take 650 mg by mouth 2 (Two) Times a Day.   3/8/2022 at Unknown time   • calcitriol (ROCALTROL) 0.5 MCG capsule Take 0.5 mcg by mouth Daily.   3/8/2022 at Unknown time   • DULoxetine (CYMBALTA) 30 MG capsule Take 30 mg by mouth 2 (Two) Times a Day.   3/8/2022 at  Unknown time   • gabapentin (NEURONTIN) 600 MG tablet Take 300 mg by mouth 3 (Three) Times a Day.   3/9/2022 at 0500   • levothyroxine (SYNTHROID, LEVOTHROID) 125 MCG tablet Take 125 mcg by mouth Every Morning.   3/9/2022 at 0500   • lisinopril (PRINIVIL,ZESTRIL) 5 MG tablet Take 5 mg by mouth Daily.   3/8/2022 at Unknown time   • traMADol (ULTRAM) 50 MG tablet Take 50 mg by mouth Every 6 (Six) Hours As Needed.   Past Month at Unknown time   • traZODone (DESYREL) 50 MG tablet Take 50 mg by mouth As Needed for Sleep.   Past Month at Unknown time   • TURMERIC PO Take 1 tablet by mouth Every Night.   Past Month at Unknown time   • B Complex Vitamins (VITAMIN B COMPLEX) capsule capsule Take 1 capsule by mouth Every Night.   3/2/2022   • Cholecalciferol (VITAMIN D3) 75 MCG (3000 UT) tablet Take 3,000 Units by mouth Daily.   3/2/2022   • cyclobenzaprine (FLEXERIL) 5 MG tablet Take 5 mg by mouth As Needed.   More than a month at Unknown time   • estradiol (ESTRACE) 0.1 MG/GM vaginal cream Insert 1 g into the vagina.   More than a month at Unknown time   • famotidine (PEPCID) 20 MG tablet Take 20 mg by mouth Daily.   More than a month at Unknown time   • ferrous sulfate 325 (65 FE) MG tablet Take 325 mg by mouth Every Other Day.   More than a month at Unknown time   • HYDROcodone-acetaminophen (NORCO) 5-325 MG per tablet Take 1 tablet by mouth Every 6 (Six) Hours As Needed.   More than a month at Unknown time   • Loratadine 10 MG capsule Take 10 mg by mouth As Needed.   3/7/2022   • polyethylene glycol (MIRALAX) 17 GM/SCOOP powder Take 17 g by mouth As Needed.   More than a month at Unknown time   • Probiotic Product (PROBIOTIC PO) Take 1 capsule by mouth As Needed.   More than a month at Unknown time   • vitamin B-12 (CYANOCOBALAMIN) 1000 MCG tablet Take 1,000 mcg by mouth Daily.   2/23/2022       Allergies:  Baclofen, Morphine, Nsaids, Flagyl [metronidazole], Lodine [etodolac], Macrodantin [nitrofurantoin], Other, and  Tetracyclines & related    Debilities:  none    Objective     Vital Signs  Temp:  [98.8 °F (37.1 °C)] 98.8 °F (37.1 °C)  Heart Rate:  [67] 67  Resp:  [10] 10  BP: (135)/(71) 135/71  No intake or output data in the 24 hours ending 03/09/22 0813  Flowsheet Rows    Flowsheet Row First Filed Value   Admission Height --   Admission Weight 105 kg (231 lb) Documented at 03/09/2022 0645           Physical Exam:     General Appearance:    Alert, cooperative, NAD   HEENT:    No trauma, pupils reactive, hearing intact   Back:     No CVA tenderness   Lungs:     Respirations unlabored, no wheezing    Heart:    RRR, intact peripheral pulses   Abdomen:     Soft, NDNT, no masses, no guarding   :    def   Extremities:   No edema, no deformity   Lymphatic:   No neck or groin LAD   Skin:   No bleeding, bruising or rashes   Neuro/Psych:   Orientation intact, mood/affect pleasant, no focal findings     Results Review:    I reviewed the patient's new clinical results.  Results for orders placed or performed in visit on 03/07/22   CBC Auto Differential    Specimen: Blood   Result Value Ref Range    WBC 8.91 3.40 - 10.80 10*3/mm3    RBC 4.55 3.77 - 5.28 10*6/mm3    Hemoglobin 13.7 12.0 - 15.9 g/dL    Hematocrit 42.1 34.0 - 46.6 %    MCV 92.5 79.0 - 97.0 fL    MCH 30.1 26.6 - 33.0 pg    MCHC 32.5 31.5 - 35.7 g/dL    RDW 12.9 12.3 - 15.4 %    RDW-SD 43.2 37.0 - 54.0 fl    MPV 10.1 6.0 - 12.0 fL    Platelets 326 140 - 450 10*3/mm3    Neutrophil % 58.2 42.7 - 76.0 %    Lymphocyte % 30.8 19.6 - 45.3 %    Monocyte % 8.2 5.0 - 12.0 %    Eosinophil % 2.1 0.3 - 6.2 %    Basophil % 0.3 0.0 - 1.5 %    Immature Grans % 0.4 0.0 - 0.5 %    Neutrophils, Absolute 5.18 1.70 - 7.00 10*3/mm3    Lymphocytes, Absolute 2.74 0.70 - 3.10 10*3/mm3    Monocytes, Absolute 0.73 0.10 - 0.90 10*3/mm3    Eosinophils, Absolute 0.19 0.00 - 0.40 10*3/mm3    Basophils, Absolute 0.03 0.00 - 0.20 10*3/mm3    Immature Grans, Absolute 0.04 0.00 - 0.05 10*3/mm3    nRBC 0.0 0.0  - 0.2 /100 WBC        Assessment:  Left renal mass    Plan:    Laparoscopic partial nephrectomy    I discussed the patient's findings and my recommendations with patient.   Risks, complications, outcomes and alternatives discussed with the patient at the bedside and office.    Yves Hawley MD  03/09/22  08:13 EST

## 2022-03-09 NOTE — PLAN OF CARE
Goal Outcome Evaluation:  Plan of Care Reviewed With: patient        Progress: no change  Outcome Evaluation: Post op patient of L partial nephrectomy, VSS, IVF, resting comfortably, F/C, and a BROWN drain. Will continue to monitor.

## 2022-03-09 NOTE — ANESTHESIA PROCEDURE NOTES
Peripheral Block      Patient reassessed immediately prior to procedure    Patient location during procedure: OR  Start time: 3/9/2022 8:16 AM  Stop time: 3/9/2022 8:18 AM  Reason for block: procedure for pain, at surgeon's request and post-op pain management  Performed by  CRNA: Dori Holland CRNA  Preanesthetic Checklist  Completed: patient identified, IV checked, site marked, risks and benefits discussed, surgical consent, monitors and equipment checked, pre-op evaluation and timeout performed  Prep:  Pt Position: supine  Sterile barriers:cap, gloves, mask and washed/disinfected hands  Prep: ChloraPrep  Patient monitoring: blood pressure monitoring, continuous pulse oximetry and EKG  Procedure    Sedation: yes  Performed under: general  Guidance:ultrasound guided  Images:still images obtained, printed/placed on chart    Laterality:left  Block Type:TAP (SUBCOSTAL TAP)  Injection Technique:single-shot  Needle Type:echogenic  Needle Gauge:21 G  Resistance on Injection: none    Medications Used: bupivacaine PF (MARCAINE) injection 0.5%, 15 mL  Med administered at 3/9/2022 8:18 AM      Medications  Preservative Free Saline:5ml  Comment:PRECEDEX 20MCG ADDED TO BLOCK    Post Assessment  Injection Assessment: negative aspiration for heme, no paresthesia on injection and incremental injection  Patient Tolerance:comfortable throughout block  Complications:no

## 2022-03-09 NOTE — ANESTHESIA PROCEDURE NOTES
Peripheral Block      Patient reassessed immediately prior to procedure    Patient location during procedure: OR  Start time: 3/9/2022 8:13 AM  Stop time: 3/9/2022 8:15 AM  Reason for block: procedure for pain, at surgeon's request and post-op pain management  Performed by  CRNA: Dori Holland CRNA  Preanesthetic Checklist  Completed: patient identified, IV checked, site marked, risks and benefits discussed, surgical consent, monitors and equipment checked, pre-op evaluation and timeout performed  Prep:  Pt Position: supine  Sterile barriers:cap, gloves, mask and washed/disinfected hands  Prep: ChloraPrep  Patient monitoring: blood pressure monitoring, continuous pulse oximetry and EKG  Procedure    Sedation: yes  Performed under: general  Guidance:ultrasound guided and landmark technique    ULTRASOUND INTERPRETATION. Using ultrasound guidance a 21 G gauge needle was placed in close proximity to the nerve, at which point, under ultrasound guidance anesthetic was injected in the area of the nerve and spread of the anesthesia was seen on ultrasound in close proximity thereto.  There were no abnormalities seen on ultrasound; a digital image was taken; and the patient tolerated the procedure with no complications. Images:still images obtained, printed/placed on chart    Laterality:left  Block Type:TAP (MIDAXILLARY TAP )  Injection Technique:single-shot  Needle Type:echogenic  Needle Gauge:21 G  Resistance on Injection: none    Medications Used: bupivacaine PF (MARCAINE) injection 0.5%, 15 mL  Med administered at 3/9/2022 8:15 AM      Medications  Preservative Free Saline:5ml  Comment:PRECEDEX 20MCG ADDED TO BLOCK    Post Assessment  Injection Assessment: negative aspiration for heme, no paresthesia on injection and incremental injection  Patient Tolerance:comfortable throughout block  Complications:no

## 2022-03-09 NOTE — ANESTHESIA PROCEDURE NOTES
Airway  Urgency: elective    Date/Time: 3/9/2022 8:11 AM  End Time:3/9/2022 8:11 AM  Airway not difficult    General Information and Staff    Patient location during procedure: OR  CRNA: Dori Holland CRNA    Indications and Patient Condition  Indications for airway management: airway protection    Preoxygenated: yes  MILS maintained throughout  Mask difficulty assessment: 0 - not attempted    Final Airway Details  Final airway type: endotracheal airway      Successful airway: ETT  Cuffed: yes   Successful intubation technique: direct laryngoscopy  Facilitating devices/methods: intubating stylet  Endotracheal tube insertion site: oral  Blade: Baltazar  Blade size: 2  ETT size (mm): 7.5  Cormack-Lehane Classification: grade I - full view of glottis  Placement verified by: chest auscultation and capnometry   Measured from: lips  ETT/EBT  to lips (cm): 20  Number of attempts at approach: 1  Assessment: lips, teeth, and gum same as pre-op and atraumatic intubation

## 2022-03-10 VITALS
DIASTOLIC BLOOD PRESSURE: 69 MMHG | BODY MASS INDEX: 42.69 KG/M2 | WEIGHT: 232 LBS | SYSTOLIC BLOOD PRESSURE: 108 MMHG | RESPIRATION RATE: 18 BRPM | OXYGEN SATURATION: 100 % | HEIGHT: 62 IN | HEART RATE: 68 BPM | TEMPERATURE: 97.7 F

## 2022-03-10 LAB
ANION GAP SERPL CALCULATED.3IONS-SCNC: 6.2 MMOL/L (ref 5–15)
BUN SERPL-MCNC: 21 MG/DL (ref 8–23)
BUN/CREAT SERPL: 16.2 (ref 7–25)
CALCIUM SPEC-SCNC: 8.5 MG/DL (ref 8.6–10.5)
CHLORIDE SERPL-SCNC: 103 MMOL/L (ref 98–107)
CO2 SERPL-SCNC: 25.8 MMOL/L (ref 22–29)
CREAT SERPL-MCNC: 1.3 MG/DL (ref 0.57–1)
DEPRECATED RDW RBC AUTO: 47.8 FL (ref 37–54)
EGFRCR SERPLBLD CKD-EPI 2021: 43.5 ML/MIN/1.73
ERYTHROCYTE [DISTWIDTH] IN BLOOD BY AUTOMATED COUNT: 13.1 % (ref 12.3–15.4)
GLUCOSE SERPL-MCNC: 125 MG/DL (ref 65–99)
HCT VFR BLD AUTO: 34.2 % (ref 34–46.6)
HGB BLD-MCNC: 10.5 G/DL (ref 12–15.9)
LAB AP CASE REPORT: NORMAL
LAB AP DIAGNOSIS COMMENT: NORMAL
LAB AP SPECIAL STAINS: NORMAL
LAB AP SYNOPTIC CHECKLIST: NORMAL
MCH RBC QN AUTO: 30.2 PG (ref 26.6–33)
MCHC RBC AUTO-ENTMCNC: 30.7 G/DL (ref 31.5–35.7)
MCV RBC AUTO: 98.3 FL (ref 79–97)
PATH REPORT.FINAL DX SPEC: NORMAL
PATH REPORT.GROSS SPEC: NORMAL
PLATELET # BLD AUTO: 241 10*3/MM3 (ref 140–450)
PMV BLD AUTO: 9.6 FL (ref 6–12)
POTASSIUM SERPL-SCNC: 5.3 MMOL/L (ref 3.5–5.2)
POTASSIUM SERPL-SCNC: 5.7 MMOL/L (ref 3.5–5.2)
RBC # BLD AUTO: 3.48 10*6/MM3 (ref 3.77–5.28)
SODIUM SERPL-SCNC: 135 MMOL/L (ref 136–145)
WBC NRBC COR # BLD: 10 10*3/MM3 (ref 3.4–10.8)

## 2022-03-10 PROCEDURE — 63710000001 CALCITRIOL 0.25 MCG CAPSULE: Performed by: UROLOGY

## 2022-03-10 PROCEDURE — A9270 NON-COVERED ITEM OR SERVICE: HCPCS | Performed by: UROLOGY

## 2022-03-10 PROCEDURE — 63710000001 GABAPENTIN 300 MG CAPSULE: Performed by: UROLOGY

## 2022-03-10 PROCEDURE — 63710000001 OXYCODONE-ACETAMINOPHEN 7.5-325 MG TABLET: Performed by: UROLOGY

## 2022-03-10 PROCEDURE — 63710000001 LEVOTHYROXINE 125 MCG TABLET: Performed by: UROLOGY

## 2022-03-10 PROCEDURE — 63710000001 LISINOPRIL 5 MG TABLET: Performed by: UROLOGY

## 2022-03-10 PROCEDURE — 84132 ASSAY OF SERUM POTASSIUM: CPT | Performed by: UROLOGY

## 2022-03-10 PROCEDURE — G0378 HOSPITAL OBSERVATION PER HR: HCPCS

## 2022-03-10 PROCEDURE — 63710000001 FAMOTIDINE 20 MG TABLET: Performed by: UROLOGY

## 2022-03-10 PROCEDURE — 63710000001 DULOXETINE 30 MG CAPSULE DELAYED-RELEASE PARTICLES: Performed by: UROLOGY

## 2022-03-10 PROCEDURE — 80048 BASIC METABOLIC PNL TOTAL CA: CPT | Performed by: UROLOGY

## 2022-03-10 PROCEDURE — 85027 COMPLETE CBC AUTOMATED: CPT | Performed by: UROLOGY

## 2022-03-10 RX ADMIN — LISINOPRIL 5 MG: 5 TABLET ORAL at 09:40

## 2022-03-10 RX ADMIN — CALCITRIOL 0.5 MCG: 0.25 CAPSULE ORAL at 09:40

## 2022-03-10 RX ADMIN — LEVOTHYROXINE SODIUM 125 MCG: 125 TABLET ORAL at 06:27

## 2022-03-10 RX ADMIN — GABAPENTIN 300 MG: 300 CAPSULE ORAL at 06:27

## 2022-03-10 RX ADMIN — DULOXETINE HYDROCHLORIDE 30 MG: 30 CAPSULE, DELAYED RELEASE ORAL at 09:40

## 2022-03-10 RX ADMIN — FAMOTIDINE 20 MG: 20 TABLET ORAL at 09:40

## 2022-03-10 RX ADMIN — OXYCODONE AND ACETAMINOPHEN 1 TABLET: 7.5; 325 TABLET ORAL at 09:40

## 2022-03-10 NOTE — DISCHARGE INSTRUCTIONS
Clinical References  Minimally Invasive Nephrectomy, Care After  This sheet gives you information about how to care for yourself after your procedure. Your health care provider may also give you more specific instructions. If you have problems or questions, contact your health care provider.  What can I expect after the procedure?  After the procedure, it is common to have:  Pain.  Soreness and numbness in the area around your incisions.  Follow these instructions at home:  Medicines    Take over-the-counter and prescription medicines only as told by your health care provider.  Avoid using NSAIDs regularly over a long period of time. These include aspirin and ibuprofen. Doing so may damage your remaining kidney.  Ask your health care provider if the medicine prescribed to you:  Requires you to avoid driving or using machinery.  Can cause constipation. You may need to take these actions to prevent or treat constipation:  Drink enough fluid to keep your urine pale yellow.  Take over-the-counter or prescription medicines.  Eat foods that are high in fiber, such as beans, whole grains, and fresh fruits and vegetables.  Limit foods that are high in fat and processed sugars, such as fried or sweet foods.     Incision care and drain care    Follow instructions from your health care provider about how to take care of your incisions. Make sure you:  Wash your hands with soap and water for at least 20 seconds before and after you change your bandage (dressing). If soap and water are not available, use hand .  Change your dressing as told by your health care provider.  Leave stitches (sutures), skin glue, or adhesive strips in place. These skin closures may need to be in place for 2 weeks or longer. If adhesive strip edges start to loosen and curl up, you may trim the loose edges. Do not remove adhesive strips completely unless your health care provider tells you to do that.  Check your incision area every day for  signs of infection. Check for:  Redness, swelling, or more pain.  Fluid or blood.  Warmth.  Pus or a bad smell.  If you have a drain in place, follow your health care provider's instructions for drain care. This may include emptying the drain and keeping track of the amount of drainage.     Activity    Rest as told by your health care provider.  Avoid sitting for a long time without moving. Get up to take short walks every 1-2 hours. This is important to improve blood flow and breathing. Ask for help if you feel weak or unsteady.  Do not lift anything that is heavier than 10 lb (4.5 kg), or the limit that you are told, until your health care provider says that it is safe.  Do not play contact sports. Doing so may damage your remaining kidney.  Return to your normal activities as told by your health care provider. Ask your health care provider what activities are safe for you.     Catheter care  If you have a urinary catheter, care for it as told by your health care provider. You may be told to:  Wash your hands with soap and water for at least 20 seconds before and after touching the catheter, tubing, or drainage bag. If soap and water are not available, use hand .  Empty the drainage bag every 2-4 hours, or more often if needed. Do not let the bag get completely full.  Monitor the amount and color of your urine as told by your health care provider.  Keep the area around the catheter clean and dry.  Make sure to keep the catheter secured to avoid pulling and accidental removal.  Always keep the urine collection bag below the level of your bladder.  Check the catheter tubing regularly to make sure there are no kinks or blockages.  Make sure that the catheter is not placed under water.  General instructions  Do not take baths, swim, or use a hot tub until your health care provider approves. Ask your health care provider if you may take showers. You may only be allowed to take sponge baths.  Do deep breathing  exercises as told by your health care provider. These help to prevent lung infection (pneumonia).  Wear compression stockings as told by your health care provider. These stockings help to prevent blood clots and reduce swelling in your legs.  Keep all follow-up visits. This is important.  Contact a health care provider if:  You have a fever or chills.  You have pain that is not controlled by your pain medicine.  You have redness, swelling, or more pain at an incision site.  You have a cough.  An incision is warm to the touch.  You have blood in your urine.  You have not had a bowel movement in 3 days.  You have diarrhea.  Get help right away if:  You have trouble breathing or feel short of breath.  You have chest pain.  You have severe pain.  There is fluid or blood coming from an incision.  There is pus or a bad smell coming from an incision.  You cannot urinate.  You have warmth, redness, and tenderness in your leg.  These symptoms may represent a serious problem that is an emergency. Do not wait to see if the symptoms will go away. Get medical help right away. Call your local emergency services (911 in the [place] Do not drive yourself to the hospital.  Summary  Follow instructions from your health care provider about how to take care of your incisions. Check your incision area every day for signs of infection.  Take over-the-counter and prescription medicines only as told by your health care provider.  Return to your normal activities as told by your health care provider. Ask your health care provider what activities are safe for you.  Keep all follow-up visits. This is important.  This information is not intended to replace advice given to you by your health care provider. Make sure you discuss any questions you have with your health care provider.  Document Revised: 04/12/2021 Document Reviewed: 04/12/2021  Elsevier Patient Education © 2021 [employer]          Gabapentin capsules or tablets  What is this  medicine?  GABAPENTIN (GA ba pen tin) is used to control seizures in certain types of epilepsy. It is also used to treat certain types of nerve pain.  This medicine may be used for other purposes; ask your health care provider or pharmacist if you have questions.  COMMON BRAND NAME(S): Active-PAC with Gabapentin, Gabarone, Gralise, Neurontin  What should I tell my health care provider before I take this medicine?  They need to know if you have any of these conditions:  history of drug abuse or alcohol abuse problem  kidney disease  lung or breathing disease  suicidal thoughts, plans, or attempt; a previous suicide attempt by you or a family member  an unusual or allergic reaction to gabapentin, other medicines, foods, dyes, or preservatives  pregnant or trying to get pregnant  breast-feeding  How should I use this medicine?  Take this medicine by mouth with a glass of water. Follow the directions on the prescription label. You can take it with or without food. If it upsets your stomach, take it with food. Take your medicine at regular intervals. Do not take it more often than directed. Do not stop taking except on your doctor's advice.  If you are directed to break the 600 or 800 mg tablets in half as part of your dose, the extra half tablet should be used for the next dose. If you have not used the extra half tablet within 28 days, it should be thrown away.  A special MedGuide will be given to you by the pharmacist with each prescription and refill. Be sure to read this information carefully each time.  Talk to your pediatrician regarding the use of this medicine in children. While this drug may be prescribed for children as young as 3 years for selected conditions, precautions do apply.  Overdosage: If you think you have taken too much of this medicine contact a poison control center or emergency room at once.  NOTE: This medicine is only for you. Do not share this medicine with others.  What if I miss a dose?  If  you miss a dose, take it as soon as you can. If it is almost time for your next dose, take only that dose. Do not take double or extra doses.  What may interact with this medicine?  This medicine may interact with the following medications:  alcohol  antihistamines for allergy, cough, and cold  certain medicines for anxiety or sleep  certain medicines for depression like amitriptyline, fluoxetine, sertraline  certain medicines for seizures like phenobarbital, primidone  certain medicines for stomach problems  general anesthetics like halothane, isoflurane, methoxyflurane, propofol  local anesthetics like lidocaine, pramoxine, tetracaine  medicines that relax muscles for surgery  narcotic medicines for pain  phenothiazines like chlorpromazine, mesoridazine, prochlorperazine, thioridazine  This list may not describe all possible interactions. Give your health care provider a list of all the medicines, herbs, non-prescription drugs, or dietary supplements you use. Also tell them if you smoke, drink alcohol, or use illegal drugs. Some items may interact with your medicine.  What should I watch for while using this medicine?  Visit your doctor or health care provider for regular checks on your progress. You may want to keep a record at home of how you feel your condition is responding to treatment. You may want to share this information with your doctor or health care provider at each visit. You should contact your doctor or health care provider if your seizures get worse or if you have any new types of seizures. Do not stop taking this medicine or any of your seizure medicines unless instructed by your doctor or health care provider. Stopping your medicine suddenly can increase your seizures or their severity.  This medicine may cause serious skin reactions. They can happen weeks to months after starting the medicine. Contact your health care provider right away if you notice fevers or flu-like symptoms with a rash. The  rash may be red or purple and then turn into blisters or peeling of the skin. Or, you might notice a red rash with swelling of the face, lips or lymph nodes in your neck or under your arms.  Wear a medical identification bracelet or chain if you are taking this medicine for seizures, and carry a card that lists all your medications.  You may get drowsy, dizzy, or have blurred vision. Do not drive, use machinery, or do anything that needs mental alertness until you know how this medicine affects you. To reduce dizzy or fainting spells, do not sit or stand up quickly, especially if you are an older patient. Alcohol can increase drowsiness and dizziness. Avoid alcoholic drinks.  Your mouth may get dry. Chewing sugarless gum or sucking hard candy, and drinking plenty of water will help.  The use of this medicine may increase the chance of suicidal thoughts or actions. Pay special attention to how you are responding while on this medicine. Any worsening of mood, or thoughts of suicide or dying should be reported to your health care provider right away.  Women who become pregnant while using this medicine may enroll in the [place] Antiepileptic Drug Pregnancy Registry by calling 1-640.548.6318. This registry collects information about the safety of antiepileptic drug use during pregnancy.  What side effects may I notice from receiving this medicine?  Side effects that you should report to your doctor or health care professional as soon as possible:  allergic reactions like skin rash, itching or hives, swelling of the face, lips, or tongue  breathing problems  rash, fever, and swollen lymph nodes  redness, blistering, peeling or loosening of the skin, including inside the mouth  suicidal thoughts, mood changes  Side effects that usually do not require medical attention (report to your doctor or health care professional if they continue or are bothersome):  dizziness  drowsiness  headache  nausea, vomiting  swelling  of ankles, feet, hands  tiredness  This list may not describe all possible side effects. Call your doctor for medical advice about side effects. You may report side effects to FDA at 1-880-WOJ-6226.  Where should I keep my medicine?  Keep out of reach of children.  This medicine may cause accidental overdose and death if it taken by other adults, children, or pets. Mix any unused medicine with a substance like cat litter or coffee grounds. Then throw the medicine away in a sealed container like a sealed bag or a coffee can with a lid. Do not use the medicine after the expiration date.  Store at room temperature between 15 and 30 degrees C (59 and 86 degrees F).  NOTE: This sheet is a summary. It may not cover all possible information. If you have questions about this medicine, talk to your doctor, pharmacist, or health care provider.  © 2021 Elsevier/[employer] Standard (2020-03-20 14:16:43)        Acetaminophen; Oxycodone tablets  What is this medicine?  ACETAMINOPHEN; OXYCODONE (a set a PAM to fen; ox i KOE done) is a pain reliever. It is used to treat moderate to severe pain.  This medicine may be used for other purposes; ask your health care provider or pharmacist if you have questions.  COMMON BRAND NAME(S): Endocet, Magnacet, Nalocet, Narvox, Percocet, Perloxx, Primalev, Primlev, Prolate, Roxicet, Xolox  What should I tell my health care provider before I take this medicine?  They need to know if you have any of these conditions:  brain tumor  Crohn's disease, inflammatory bowel disease, or ulcerative colitis  drug abuse or addiction  head injury  heart or circulation problems  if you often drink alcohol  kidney disease or problems going to the bathroom  liver disease  lung disease, asthma, or breathing problems  an unusual or allergic reaction to acetaminophen, oxycodone, other opioid analgesics, other medicines, foods, dyes, or preservatives  pregnant or trying to get pregnant  breast-feeding  How should I use  this medicine?  Take this medicine by mouth with a full glass of water. Follow the directions on the prescription label. You can take it with or without food. If it upsets your stomach, take it with food. Take your medicine at regular intervals. Do not take it more often than directed.  A special MedGuide will be given to you by the pharmacist with each prescription and refill. Be sure to read this information carefully each time.  Talk to your pediatrician regarding the use of this medicine in children. Special care may be needed.  Overdosage: If you think you have taken too much of this medicine contact a poison control center or emergency room at once.  NOTE: This medicine is only for you. Do not share this medicine with others.  What if I miss a dose?  If you miss a dose, take it as soon as you can. If it is almost time for your next dose, take only that dose. Do not take double or extra doses.  What may interact with this medicine?  This medicine may interact with the following medications:  alcohol  antihistamines for allergy, cough and cold  antiviral medicines for HIV or AIDS  atropine  certain antibiotics like clarithromycin, erythromycin, linezolid, rifampin  certain medicines for anxiety or sleep  certain medicines for bladder problems like oxybutynin, tolterodine  certain medicines for depression like amitriptyline, fluoxetine, sertraline  certain medicines for fungal infections like ketoconazole, itraconazole, voriconazole  certain medicines for migraine headache like almotriptan, eletriptan, frovatriptan, naratriptan, rizatriptan, sumatriptan, zolmitriptan  certain medicines for nausea or vomiting like dolasetron, ondansetron, palonosetron  certain medicines for Parkinson's disease like benztropine, trihexyphenidyl  certain medicines for seizures like phenobarbital, phenytoin, primidone  certain medicines for stomach problems like dicyclomine, hyoscyamine  certain medicines for travel sickness like  scopolamine  diuretics  general anesthetics like halothane, isoflurane, methoxyflurane, propofol  ipratropium  local anesthetics like lidocaine, pramoxine, tetracaine  MAOIs like Carbex, Eldepryl, Marplan, Nardil, and Parnate  medicines that relax muscles for surgery  methylene blue  nilotinib  other medicines with acetaminophen  other narcotic medicines for pain or cough  phenothiazines like chlorpromazine, mesoridazine, prochlorperazine, thioridazine  This list may not describe all possible interactions. Give your health care provider a list of all the medicines, herbs, non-prescription drugs, or dietary supplements you use. Also tell them if you smoke, drink alcohol, or use illegal drugs. Some items may interact with your medicine.  What should I watch for while using this medicine?  Tell your health care provider if your pain does not go away, if it gets worse, or if you have new or a different type of pain. You may develop tolerance to this drug. Tolerance means that you will need a higher dose of the drug for pain relief. Tolerance is normal and is expected if you take this drug for a long time.  There are different types of narcotic drugs (opioids) for pain. If you take more than one type at the same time, you may have more side effects. Give your health care provider a list of all drugs you use. He or she will tell you how much drug to take. Do not take more drug than directed. Get emergency help right away if you have problems breathing.  Do not suddenly stop taking your drug because you may develop a severe reaction. Your body becomes used to the drug. This does NOT mean you are addicted. Addiction is a behavior related to getting and using a drug for a nonmedical reason. If you have pain, you have a medical reason to take pain drug. Your health care provider will tell you how much drug to take. If your health care provider wants you to stop the drug, the dose will be slowly lowered over time to avoid any  side effects.  Talk to your health care provider about naloxone and how to get it. Naloxone is an emergency drug used for an opioid overdose. An overdose can happen if you take too much opioid. It can also happen if an opioid is taken with some other drugs or substances, like alcohol. Know the symptoms of an overdose, like trouble breathing, unusually tired or sleepy, or not being able to respond or wake up. Make sure to tell caregivers and close contacts where it is stored. Make sure they know how to use it. After naloxone is given, you must get emergency help right away. Naloxone is a temporary treatment. Repeat doses may be needed.  Do not take other drugs that contain acetaminophen with this drug. Many non-prescription drugs contain acetaminophen. Always read labels carefully. If you have questions, ask your health care provider.  If you take too much acetaminophen, get medical help right away. Too much acetaminophen can be very dangerous and cause liver damage. Even if you do not have symptoms, it is important to get help right away.  This drug does not prevent a heart attack or stroke. This drug may increase the chance of a heart attack or stroke. The chance may increase the longer you use this drug or if you have heart disease. If you take aspirin to prevent a heart attack or stroke, talk to your health care provider about using this drug.  You may get drowsy or dizzy. Do not drive, use machinery, or do anything that needs mental alertness until you know how this drug affects you. Do not stand up or sit up quickly, especially if you are an older patient. This reduces the risk of dizzy or fainting spells. Alcohol may interfere with the effect of this drug. Avoid alcoholic drinks.  This drug will cause constipation. If you do not have a bowel movement for 3 days, call your health care provider.  Your mouth may get dry. Chewing sugarless gum or sucking hard candy and drinking plenty of water may help. Contact  your health care provider if the problem does not go away or is severe.  What side effects may I notice from receiving this medicine?  Side effects that you should report to your doctor or health care professional as soon as possible:  allergic reactions like skin rash, itching or hives, swelling of the face, lips, or tongue  breathing problems  confusion  redness, blistering, peeling or loosening of the skin, including inside the mouth  signs and symptoms of liver injury like dark yellow or brown urine; general ill feeling or flu-like symptoms; light-colored stools; loss of appetite; nausea; right upper belly pain; unusually weak or tired; yellowing of the eyes or skin  signs and symptoms of low blood pressure like dizziness; feeling faint or lightheaded, falls; unusually weak or tired  trouble passing urine or change in the amount of urine  Side effects that usually do not require medical attention (report to your doctor or health care professional if they continue or are bothersome):  constipation  dry mouth  nausea, vomiting  tiredness  This list may not describe all possible side effects. Call your doctor for medical advice about side effects. You may report side effects to FDA at 8-672-QSX-7300.  Where should I keep my medicine?  Keep out of the reach of children. This medicine can be abused. Keep your medicine in a safe place to protect it from theft. Do not share this medicine with anyone. Selling or giving away this medicine is dangerous and against the law.  Store at room temperature between 20 and 25 degrees C (68 and 77 degrees F).  This medicine may cause harm and death if it is taken by other adults, children, or pets. Return medicine that has not been used to an official disposal site. Contact the [facility] at [phone] or your [facility] government to find a site. If you cannot return the medicine, flush it down the toilet. Do not use the medicine after the expiration date.  NOTE: This sheet is a  summary. It may not cover all possible information. If you have questions about this medicine, talk to your doctor, pharmacist, or health care provider.  © 2021 [employer]vier/[employer] Standard (2020-07-28 12:25:25)        Surgical Site Infections FAQs     What is a Surgical Site Infection (SSI)?     A surgical site infection is an infection that occurs after surgery in the part of the body where the surgery took place. Most patients who have surgery do not develop an infection. However, infections develop in about 1 to 3 out of every 100 patients who have surgery.     Some of the common symptoms of a surgical site infection are:     Redness and pain around the area where you had surgery  Drainage of cloudy fluid from your surgical wound  Fever     Can SSIs be treated?     Yes. Most surgical site infections can be treated with antibiotics. The antibiotic given to you depends on the bacteria (germs) causing the infection. Sometimes patients with SSIs also need another surgery to treat the infection.     What are some of the things that hospitals are doing to prevent SSIs?     To prevent SSIs, doctors, nurses, and other healthcare providers:     Clean their hands and arms up to their elbows with an antiseptic agent just before the surgery.  Clean their hands with soap and water or an alcohol-based hand rub before and after caring for each patient.  May remove some of your hair immediately before your surgery using electric clippers if the hair is in the same area where the procedure will occur. They should not shave you with a razor.  Wear special hair covers, masks, gowns, and gloves during surgery to keep the surgery area clean.  Give you antibiotics before your surgery starts. In most cases, you should get antibiotics within 60 minutes before the surgery starts and the antibiotics should be stopped within 24 hours after surgery.  Clean the skin at the site of your surgery with a special soap that kills germs.     What can I  do to help prevent SSIs? Before your surgery:     Tell your doctor about other medical problems you may have. Health problems such as allergies, diabetes, and obesity could affect your surgery and your treatment.  Quit smoking. Patients who smoke get more infections. Talk to your doctor about how you can quit before your surgery.  Do not shave near where you will have surgery. Shaving with a razor can irritate your skin and make it easier to develop an infection.     At the time of your surgery:      Speak up if someone tries to shave you with a razor before surgery. Ask why you need to be shaved and talk with your surgeon if you have any concerns.  Ask if you will get antibiotics before surgery.     After your surgery:     Make sure that your healthcare providers clean their hands before examining you, either with soap and water or an alcohol-based hand rub.  If you do not see your providers clean their hands, please ask them to do so.  Family and friends who visit you should not touch the surgical wound or dressings.  Family and friends should clean their hands with soap and water or an alcohol-based hand rub before and after visiting you. If you do not see them clean their hands, ask them to clean their hands.     What do I need to do when I go home from the hospital?     Before you go home, your doctor or nurse should explain everything you need to know about taking care of your wound. Make sure you understand how to care for your wound before you leave the hospital.  Always clean your hands before and after caring for your wound.  Before you go home, make sure you know who to contact if you have questions or problems after you get home.  If you have any symptoms of an infection, such as redness and pain at the surgery site, drainage, or fever, call your doctor immediately.     If you have additional questions, please ask your doctor or nurse.     Developed and co-sponsored by The Society for Healthcare  Epidemiology of [place] (SHEA); Infectious Diseases Society of [place] (IDSA); American Hospital Association; Association for Professionals in Infection Control and Epidemiology (APIC); Centers for Disease Control and Prevention (CDC); and The Joint Commission.     This information is not intended to replace advice given to you by your health care provider. Make sure you discuss any questions you have with your health care provider.     Document Released: 12/23/2014 Document Revised: 11/15/2017 Document Reviewed: 05/08/2017  [employer] Interactive Patient Education © 2019 [employer].  Gabapentin capsules or tablets  What is this medicine?  GABAPENTIN (GA ba pen tin) is used to control seizures in certain types of epilepsy. It is also used to treat certain types of nerve pain.  This medicine may be used for other purposes; ask your health care provider or pharmacist if you have questions.  COMMON BRAND NAME(S): Active-PAC with Gabapentin, Gabarone, Gralise, Neurontin  What should I tell my health care provider before I take this medicine?  They need to know if you have any of these conditions:  history of drug abuse or alcohol abuse problem  kidney disease  lung or breathing disease  suicidal thoughts, plans, or attempt; a previous suicide attempt by you or a family member  an unusual or allergic reaction to gabapentin, other medicines, foods, dyes, or preservatives  pregnant or trying to get pregnant  breast-feeding  How should I use this medicine?  Take this medicine by mouth with a glass of water. Follow the directions on the prescription label. You can take it with or without food. If it upsets your stomach, take it with food. Take your medicine at regular intervals. Do not take it more often than directed. Do not stop taking except on your doctor's advice.  If you are directed to break the 600 or 800 mg tablets in half as part of your dose, the extra half tablet should be used for the next dose. If you have not  used the extra half tablet within 28 days, it should be thrown away.  A special MedGuide will be given to you by the pharmacist with each prescription and refill. Be sure to read this information carefully each time.  Talk to your pediatrician regarding the use of this medicine in children. While this drug may be prescribed for children as young as 3 years for selected conditions, precautions do apply.  Overdosage: If you think you have taken too much of this medicine contact a poison control center or emergency room at once.  NOTE: This medicine is only for you. Do not share this medicine with others.  What if I miss a dose?  If you miss a dose, take it as soon as you can. If it is almost time for your next dose, take only that dose. Do not take double or extra doses.  What may interact with this medicine?  This medicine may interact with the following medications:  alcohol  antihistamines for allergy, cough, and cold  certain medicines for anxiety or sleep  certain medicines for depression like amitriptyline, fluoxetine, sertraline  certain medicines for seizures like phenobarbital, primidone  certain medicines for stomach problems  general anesthetics like halothane, isoflurane, methoxyflurane, propofol  local anesthetics like lidocaine, pramoxine, tetracaine  medicines that relax muscles for surgery  narcotic medicines for pain  phenothiazines like chlorpromazine, mesoridazine, prochlorperazine, thioridazine  This list may not describe all possible interactions. Give your health care provider a list of all the medicines, herbs, non-prescription drugs, or dietary supplements you use. Also tell them if you smoke, drink alcohol, or use illegal drugs. Some items may interact with your medicine.  What should I watch for while using this medicine?  Visit your doctor or health care provider for regular checks on your progress. You may want to keep a record at home of how you feel your condition is responding to  treatment. You may want to share this information with your doctor or health care provider at each visit. You should contact your doctor or health care provider if your seizures get worse or if you have any new types of seizures. Do not stop taking this medicine or any of your seizure medicines unless instructed by your doctor or health care provider. Stopping your medicine suddenly can increase your seizures or their severity.  This medicine may cause serious skin reactions. They can happen weeks to months after starting the medicine. Contact your health care provider right away if you notice fevers or flu-like symptoms with a rash. The rash may be red or purple and then turn into blisters or peeling of the skin. Or, you might notice a red rash with swelling of the face, lips or lymph nodes in your neck or under your arms.  Wear a medical identification bracelet or chain if you are taking this medicine for seizures, and carry a card that lists all your medications.  You may get drowsy, dizzy, or have blurred vision. Do not drive, use machinery, or do anything that needs mental alertness until you know how this medicine affects you. To reduce dizzy or fainting spells, do not sit or stand up quickly, especially if you are an older patient. Alcohol can increase drowsiness and dizziness. Avoid alcoholic drinks.  Your mouth may get dry. Chewing sugarless gum or sucking hard candy, and drinking plenty of water will help.  The use of this medicine may increase the chance of suicidal thoughts or actions. Pay special attention to how you are responding while on this medicine. Any worsening of mood, or thoughts of suicide or dying should be reported to your health care provider right away.  Women who become pregnant while using this medicine may enroll in the [place] Antiepileptic Drug Pregnancy Registry by calling 1-971.870.2213. This registry collects information about the safety of antiepileptic drug use during  pregnancy.  What side effects may I notice from receiving this medicine?  Side effects that you should report to your doctor or health care professional as soon as possible:  allergic reactions like skin rash, itching or hives, swelling of the face, lips, or tongue  breathing problems  rash, fever, and swollen lymph nodes  redness, blistering, peeling or loosening of the skin, including inside the mouth  suicidal thoughts, mood changes  Side effects that usually do not require medical attention (report to your doctor or health care professional if they continue or are bothersome):  dizziness  drowsiness  headache  nausea, vomiting  swelling of ankles, feet, hands  tiredness  This list may not describe all possible side effects. Call your doctor for medical advice about side effects. You may report side effects to FDA at 5-685-JYU-1128.  Where should I keep my medicine?  Keep out of reach of children.  This medicine may cause accidental overdose and death if it taken by other adults, children, or pets. Mix any unused medicine with a substance like cat litter or coffee grounds. Then throw the medicine away in a sealed container like a sealed bag or a coffee can with a lid. Do not use the medicine after the expiration date.  Store at room temperature between 15 and 30 degrees C (59 and 86 degrees F).  NOTE: This sheet is a summary. It may not cover all possible information. If you have questions about this medicine, talk to your doctor, pharmacist, or health care provider.  © 2021 [employer]vier/Gold Standard (2020-03-20 14:16:43)     Acetaminophen; Oxycodone tablets  What is this medicine?  ACETAMINOPHEN; OXYCODONE (a set a PAM to fen; ox i KOE done) is a pain reliever. It is used to treat moderate to severe pain.  This medicine may be used for other purposes; ask your health care provider or pharmacist if you have questions.  COMMON BRAND NAME(S): Endocet, Magnacet, Nalocet, Narvox, Percocet, Perloxx, Primalev, Primlev,  Prolate, Roxicet, Xolox  What should I tell my health care provider before I take this medicine?  They need to know if you have any of these conditions:  brain tumor  Crohn's disease, inflammatory bowel disease, or ulcerative colitis  drug abuse or addiction  head injury  heart or circulation problems  if you often drink alcohol  kidney disease or problems going to the bathroom  liver disease  lung disease, asthma, or breathing problems  an unusual or allergic reaction to acetaminophen, oxycodone, other opioid analgesics, other medicines, foods, dyes, or preservatives  pregnant or trying to get pregnant  breast-feeding  How should I use this medicine?  Take this medicine by mouth with a full glass of water. Follow the directions on the prescription label. You can take it with or without food. If it upsets your stomach, take it with food. Take your medicine at regular intervals. Do not take it more often than directed.  A special MedGuide will be given to you by the pharmacist with each prescription and refill. Be sure to read this information carefully each time.  Talk to your pediatrician regarding the use of this medicine in children. Special care may be needed.  Overdosage: If you think you have taken too much of this medicine contact a poison control center or emergency room at once.  NOTE: This medicine is only for you. Do not share this medicine with others.  What if I miss a dose?  If you miss a dose, take it as soon as you can. If it is almost time for your next dose, take only that dose. Do not take double or extra doses.  What may interact with this medicine?  This medicine may interact with the following medications:  alcohol  antihistamines for allergy, cough and cold  antiviral medicines for HIV or AIDS  atropine  certain antibiotics like clarithromycin, erythromycin, linezolid, rifampin  certain medicines for anxiety or sleep  certain medicines for bladder problems like oxybutynin, tolterodine  certain  medicines for depression like amitriptyline, fluoxetine, sertraline  certain medicines for fungal infections like ketoconazole, itraconazole, voriconazole  certain medicines for migraine headache like almotriptan, eletriptan, frovatriptan, naratriptan, rizatriptan, sumatriptan, zolmitriptan  certain medicines for nausea or vomiting like dolasetron, ondansetron, palonosetron  certain medicines for Parkinson's disease like benztropine, trihexyphenidyl  certain medicines for seizures like phenobarbital, phenytoin, primidone  certain medicines for stomach problems like dicyclomine, hyoscyamine  certain medicines for travel sickness like scopolamine  diuretics  general anesthetics like halothane, isoflurane, methoxyflurane, propofol  ipratropium  local anesthetics like lidocaine, pramoxine, tetracaine  MAOIs like Carbex, Eldepryl, Marplan, Nardil, and Parnate  medicines that relax muscles for surgery  methylene blue  nilotinib  other medicines with acetaminophen  other narcotic medicines for pain or cough  phenothiazines like chlorpromazine, mesoridazine, prochlorperazine, thioridazine  This list may not describe all possible interactions. Give your health care provider a list of all the medicines, herbs, non-prescription drugs, or dietary supplements you use. Also tell them if you smoke, drink alcohol, or use illegal drugs. Some items may interact with your medicine.  What should I watch for while using this medicine?  Tell your health care provider if your pain does not go away, if it gets worse, or if you have new or a different type of pain. You may develop tolerance to this drug. Tolerance means that you will need a higher dose of the drug for pain relief. Tolerance is normal and is expected if you take this drug for a long time.  There are different types of narcotic drugs (opioids) for pain. If you take more than one type at the same time, you may have more side effects. Give your health care provider a list of  all drugs you use. He or she will tell you how much drug to take. Do not take more drug than directed. Get emergency help right away if you have problems breathing.  Do not suddenly stop taking your drug because you may develop a severe reaction. Your body becomes used to the drug. This does NOT mean you are addicted. Addiction is a behavior related to getting and using a drug for a nonmedical reason. If you have pain, you have a medical reason to take pain drug. Your health care provider will tell you how much drug to take. If your health care provider wants you to stop the drug, the dose will be slowly lowered over time to avoid any side effects.  Talk to your health care provider about naloxone and how to get it. Naloxone is an emergency drug used for an opioid overdose. An overdose can happen if you take too much opioid. It can also happen if an opioid is taken with some other drugs or substances, like alcohol. Know the symptoms of an overdose, like trouble breathing, unusually tired or sleepy, or not being able to respond or wake up. Make sure to tell caregivers and close contacts where it is stored. Make sure they know how to use it. After naloxone is given, you must get emergency help right away. Naloxone is a temporary treatment. Repeat doses may be needed.  Do not take other drugs that contain acetaminophen with this drug. Many non-prescription drugs contain acetaminophen. Always read labels carefully. If you have questions, ask your health care provider.  If you take too much acetaminophen, get medical help right away. Too much acetaminophen can be very dangerous and cause liver damage. Even if you do not have symptoms, it is important to get help right away.  This drug does not prevent a heart attack or stroke. This drug may increase the chance of a heart attack or stroke. The chance may increase the longer you use this drug or if you have heart disease. If you take aspirin to prevent a heart attack or  stroke, talk to your health care provider about using this drug.  You may get drowsy or dizzy. Do not drive, use machinery, or do anything that needs mental alertness until you know how this drug affects you. Do not stand up or sit up quickly, especially if you are an older patient. This reduces the risk of dizzy or fainting spells. Alcohol may interfere with the effect of this drug. Avoid alcoholic drinks.  This drug will cause constipation. If you do not have a bowel movement for 3 days, call your health care provider.  Your mouth may get dry. Chewing sugarless gum or sucking hard candy and drinking plenty of water may help. Contact your health care provider if the problem does not go away or is severe.  What side effects may I notice from receiving this medicine?  Side effects that you should report to your doctor or health care professional as soon as possible:  allergic reactions like skin rash, itching or hives, swelling of the face, lips, or tongue  breathing problems  confusion  redness, blistering, peeling or loosening of the skin, including inside the mouth  signs and symptoms of liver injury like dark yellow or brown urine; general ill feeling or flu-like symptoms; light-colored stools; loss of appetite; nausea; right upper belly pain; unusually weak or tired; yellowing of the eyes or skin  signs and symptoms of low blood pressure like dizziness; feeling faint or lightheaded, falls; unusually weak or tired  trouble passing urine or change in the amount of urine  Side effects that usually do not require medical attention (report to your doctor or health care professional if they continue or are bothersome):  constipation  dry mouth  nausea, vomiting  tiredness  This list may not describe all possible side effects. Call your doctor for medical advice about side effects. You may report side effects to FDA at 9-339-FDA-4958.  Where should I keep my medicine?  Keep out of the reach of children. This medicine  can be abused. Keep your medicine in a safe place to protect it from theft. Do not share this medicine with anyone. Selling or giving away this medicine is dangerous and against the law.  Store at room temperature between 20 and 25 degrees C (68 and 77 degrees F).  This medicine may cause harm and death if it is taken by other adults, children, or pets. Return medicine that has not been used to an official disposal site. Contact the [facility] at [phone] or your [facility] government to find a site. If you cannot return the medicine, flush it down the toilet. Do not use the medicine after the expiration date.  NOTE: This sheet is a summary. It may not cover all possible information. If you have questions about this medicine, talk to your doctor, pharmacist, or health care provider.  © 2021 [employer] (2020-07-28 12:25:25)     Minimally Invasive Nephrectomy, Care After  This sheet gives you information about how to care for yourself after your procedure. Your health care provider may also give you more specific instructions. If you have problems or questions, contact your health care provider.  What can I expect after the procedure?  After the procedure, it is common to have:  Pain.  Soreness and numbness in the area around your incisions.  Follow these instructions at home:  Medicines    Take over-the-counter and prescription medicines only as told by your health care provider.  Avoid using NSAIDs regularly over a long period of time. These include aspirin and ibuprofen. Doing so may damage your remaining kidney.  Ask your health care provider if the medicine prescribed to you:  Requires you to avoid driving or using machinery.  Can cause constipation. You may need to take these actions to prevent or treat constipation:  Drink enough fluid to keep your urine pale yellow.  Take over-the-counter or prescription medicines.  Eat foods that are high in fiber, such as beans, whole grains, and fresh fruits and  vegetables.  Limit foods that are high in fat and processed sugars, such as fried or sweet foods.     Incision care and drain care    Follow instructions from your health care provider about how to take care of your incisions. Make sure you:  Wash your hands with soap and water for at least 20 seconds before and after you change your bandage (dressing). If soap and water are not available, use hand .  Change your dressing as told by your health care provider.  Leave stitches (sutures), skin glue, or adhesive strips in place. These skin closures may need to be in place for 2 weeks or longer. If adhesive strip edges start to loosen and curl up, you may trim the loose edges. Do not remove adhesive strips completely unless your health care provider tells you to do that.  Check your incision area every day for signs of infection. Check for:  Redness, swelling, or more pain.  Fluid or blood.  Warmth.  Pus or a bad smell.  If you have a drain in place, follow your health care provider's instructions for drain care. This may include emptying the drain and keeping track of the amount of drainage.     Activity    Rest as told by your health care provider.  Avoid sitting for a long time without moving. Get up to take short walks every 1-2 hours. This is important to improve blood flow and breathing. Ask for help if you feel weak or unsteady.  Do not lift anything that is heavier than 10 lb (4.5 kg), or the limit that you are told, until your health care provider says that it is safe.  Do not play contact sports. Doing so may damage your remaining kidney.  Return to your normal activities as told by your health care provider. Ask your health care provider what activities are safe for you.     Catheter care  If you have a urinary catheter, care for it as told by your health care provider. You may be told to:  Wash your hands with soap and water for at least 20 seconds before and after touching the catheter, tubing, or  drainage bag. If soap and water are not available, use hand .  Empty the drainage bag every 2-4 hours, or more often if needed. Do not let the bag get completely full.  Monitor the amount and color of your urine as told by your health care provider.  Keep the area around the catheter clean and dry.  Make sure to keep the catheter secured to avoid pulling and accidental removal.  Always keep the urine collection bag below the level of your bladder.  Check the catheter tubing regularly to make sure there are no kinks or blockages.  Make sure that the catheter is not placed under water.  General instructions  Do not take baths, swim, or use a hot tub until your health care provider approves. Ask your health care provider if you may take showers. You may only be allowed to take sponge baths.  Do deep breathing exercises as told by your health care provider. These help to prevent lung infection (pneumonia).  Wear compression stockings as told by your health care provider. These stockings help to prevent blood clots and reduce swelling in your legs.  Keep all follow-up visits. This is important.  Contact a health care provider if:  You have a fever or chills.  You have pain that is not controlled by your pain medicine.  You have redness, swelling, or more pain at an incision site.  You have a cough.  An incision is warm to the touch.  You have blood in your urine.  You have not had a bowel movement in 3 days.  You have diarrhea.  Get help right away if:  You have trouble breathing or feel short of breath.  You have chest pain.  You have severe pain.  There is fluid or blood coming from an incision.  There is pus or a bad smell coming from an incision.  You cannot urinate.  You have warmth, redness, and tenderness in your leg.  These symptoms may represent a serious problem that is an emergency. Do not wait to see if the symptoms will go away. Get medical help right away. Call your local emergency services (955 in  the [place] Do not drive yourself to the hospital.  Summary  Follow instructions from your health care provider about how to take care of your incisions. Check your incision area every day for signs of infection.  Take over-the-counter and prescription medicines only as told by your health care provider.  Return to your normal activities as told by your health care provider. Ask your health care provider what activities are safe for you.  Keep all follow-up visits. This is important.  This information is not intended to replace advice given to you by your health care provider. Make sure you discuss any questions you have with your health care provider.  Document Revised: 04/12/2021 Document Reviewed: 04/12/2021  Elsevier Patient Education © 2021 [employer].

## 2022-03-10 NOTE — DISCHARGE INSTR - APPOINTMENTS
Patient will get a call from Dr. Hawley's Office with Follow up  date and time. If you have not gotten a call by end of the week call office back [phone]    Patient has follow up with Chey Ledezma  on March 18 @ 10:15 am.  Have patient bring in all medicine bottles to appointment. [phone]

## 2022-03-10 NOTE — CASE MANAGEMENT/SOCIAL WORK
Discharge Planning Assessment  [facility]     Patient Name: Norma Ro  MRN: [id number]  Today's Date: 3/10/2022    Admit Date: 3/9/2022     Discharge Needs Assessment     Row Name 03/10/22 0910       Living Environment    People in Home spouse    Name(s) of People in Home jessica Phillips    Current Living Arrangements home    Duration at Residence 14 years    Potentially Unsafe Housing Conditions --  none    Primary Care Provided by self    Provides Primary Care For no one    Family Caregiver if Needed spouse    Family Caregiver Names Alan,     Quality of Family Relationships unable to assess    Able to Return to Prior Arrangements yes       Resource/Environmental Concerns    Resource/Environmental Concerns none    Transportation Concerns none       Transition Planning    Patient/Family Anticipates Transition to home with family    Patient/Family Anticipated Services at Transition none    Transportation Anticipated family or friend will provide       Discharge Needs Assessment    Readmission Within the Last 30 Days no previous admission in last 30 days    Current Outpatient/Agency/Support Group --  none    Equipment Currently Used at Home commode;shower chair;ramp    Concerns to be Addressed no discharge needs identified    Anticipated Changes Related to Illness none    Equipment Needed After Discharge none    Outpatient/Agency/Support Group Needs --  Pt declined    Discharge Facility/Level of Care Needs --  Pt declined    Provided Post Acute Provider List? Refused    Refused Provider List Comment Pt declined    Provided Post Acute Provider Quality & Resource List? Refused    Refused Quality and Resource List Comment Pt declined    Current Discharge Risk --  none               Discharge Plan     Row Name 03/10/22 0911       Plan    Plan Discharge home with     Patient/Family in Agreement with Plan yes    Plan Comments I spoke with the patient at bedside with her permission.  I introduced  myself and explained my role as .  I verified the information on the facesheet and the patients PCP Dr. Chey Ldeezma.   The patient uses [facility] in [place] and she is able to pay for and obtain her medications.  She is enrolled in [facility] and is agreeable to that. The patient lives in a single story home for the past 14 years. She lives with her , Alan.  There are 3 steps to enter her home at one entrance and a ramp at the other and she is able to enter and maneuver around her home with no issues.  She is independent with her ADL’s, has a car and drives.  The patient’s  will pick her up at discharge.  The patient has a shower chair and a commode at home and denied needing any DME at discharge.  I offered the patient information regarding home health and other community resources and she declined the need.   The patient will discharge home with her  to assist as needed and she is agreeable to that plan.   She denied having and further questions or concerns at this time.  CM will continue to follow for further needs.              Continued Care and Services - Admitted Since 3/9/2022    Coordination has not been started for this encounter.       Expected Discharge Date and Time     Expected Discharge Date Expected Discharge Time    Mar 10, 2022          Demographic Summary     Row Name 03/10/22 0909       General Information    Admission Type observation    Arrived From home    Required Notices Provided Observation Status Notice    Referral Source admission list    Reason for Consult discharge planning    Preferred Language English       Contact Information    Permission Granted to Share Info With                Functional Status    No documentation.                Psychosocial    No documentation.                Abuse/Neglect    No documentation.                Legal    No documentation.                Substance Abuse    No documentation.                 Patient Forms    No documentation.                   Mari Keith RN

## 2022-03-10 NOTE — PROGRESS NOTES
Patient: Norma Ro  Procedure(s):  NEPHRECTOMY PARTIAL LAPAROSCOPIC  Anesthesia type: general with block    Patient location: [facility] Floor  Last vitals:   Vitals:    03/10/22 0940   BP: 108/69   Pulse: 68   Resp: 18   Temp:    SpO2: 100%     Level of consciousness: awake, alert and oriented    Post-anesthesia pain: adequate analgesia  Airway patency: patent  Respiratory: unassisted  Cardiovascular: stable and blood pressure at baseline  Hydration: euvolemic    Anesthetic complications: no

## 2022-03-10 NOTE — PROGRESS NOTES
Progress Note    Subjective:    Postop day 1 left lap partial nephrectomy patient alert pain easily managed by pain medication tolerating diet    Objective:  Afebrile vital signs are stable her lungs are clear heart sounds normal regular rate rhythm no murmur gallops or rubs abdomen soft quiet incisions are clean BROWN 90 cc since surgery serosanguineous    Labwork reviewed stable hemoglobin 10.5 creatinine 1.30    Assessment:    Stable postop    Plan:    Remove drain May and anticipate discharge I discussed with patient postoperative discharge activities follow-up

## 2022-03-10 NOTE — DISCHARGE SUMMARY
Date of Discharge: 3-    Discharge Diagnosis: Left renal mass pathology pending    Presenting Problem/History of Present Illness  Left renal mass [N28.89]       Hospital Course  Patient is a 71 y.o. female presented with complex renal mass underwent, partial left laparoscopic nephrectomy postop day 1 tolerating p.o. pain managed ambulating hemo-globin stable minimal BROWN drainage    Catheter out this morning BROWN drain removed advancing activity diet with anticipation of discharge later today I discussed with the patient the procedure diet activity and follow-up    Procedures Performed  Procedure(s):  NEPHRECTOMY PARTIAL LAPAROSCOPIC     Blood Administration Record (From admission, onward)    None          Consults:   Consults     No orders found for last 30 day(s).          Pertinent Test Results: Pathology      Condition on Discharge: Good    Vital Signs  Temp:  [97.4 °F (36.3 °C)-98.5 °F (36.9 °C)] 97.7 °F (36.5 °C)  Heart Rate:  [57-86] 70  Resp:  [12-19] 16  BP: (106-147)/(56-87) 106/66    Physical Exam:   Lungs are clear heart sounds are normal abdomen soft quiet incisions are clean BROWN drainage 90 cc urine is clear hemoglobin 10.5    Discharge Disposition  Home or Self Care    Discharge Medications     Discharge Medications      New Medications      Instructions Start Date   oxyCODONE-acetaminophen 7.5-325 MG per tablet  Commonly known as: Percocet   1 tablet, Oral, Every 4 Hours PRN         Continue These Medications      Instructions Start Date   calcitriol 0.5 MCG capsule  Commonly known as: ROCALTROL   0.5 mcg, Oral, Daily      cyclobenzaprine 5 MG tablet  Commonly known as: FLEXERIL   5 mg, Oral, As Needed      DULoxetine 30 MG capsule  Commonly known as: CYMBALTA   30 mg, Oral, 2 Times Daily      estradiol 0.1 MG/GM vaginal cream  Commonly known as: ESTRACE   1 g, Vaginal      famotidine 20 MG tablet  Commonly known as: PEPCID   20 mg, Oral, Daily      ferrous sulfate 325 (65 FE) MG tablet   325 mg,  Oral, Every Other Day      gabapentin 600 MG tablet  Commonly known as: NEURONTIN   300 mg, Oral, 3 Times Daily      HYDROcodone-acetaminophen 5-325 MG per tablet  Commonly known as: NORCO   1 tablet, Oral, Every 6 Hours PRN      levothyroxine 125 MCG tablet  Commonly known as: SYNTHROID, LEVOTHROID   125 mcg, Oral, Every Morning      lisinopril 5 MG tablet  Commonly known as: PRINIVIL,ZESTRIL   5 mg, Oral, Daily      Loratadine 10 MG capsule   10 mg, Oral, As Needed      polyethylene glycol 17 GM/SCOOP powder  Commonly known as: MIRALAX   17 g, Oral, As Needed      PROBIOTIC PO   1 capsule, Oral, As Needed      traMADol 50 MG tablet  Commonly known as: ULTRAM   50 mg, Oral, Every 6 Hours PRN      traZODone 50 MG tablet  Commonly known as: DESYREL   50 mg, Oral, As Needed      TURMERIC PO   1 tablet, Oral, Nightly      TYLENOL ARTHRITIS PAIN PO   650 mg, Oral, 2 Times Daily      vitamin b complex capsule capsule   1 capsule, Oral, Nightly      vitamin B-12 1000 MCG tablet  Commonly known as: CYANOCOBALAMIN   1,000 mcg, Oral, Daily      Vitamin D3 75 MCG (3000 UT) tablet   3,000 Units, Oral, Daily             Discharge Diet: Clear liquids advance to regular    Activity at Discharge: Walk liberally may be passenger in a car no driving until cleared by Dr. Hawley keep out of bed as much as possible no lifting more than 5 pounds until cleared by Dr. Hawley a shower starting tomorrow no hot tubs or bathtubs until cleared by Dr. Hawley    Follow-up Appointments  Future Appointments   Date Time Provider Department Center   1/10/2023 11:00 AM Mir Martinez MD [facility]     Additional Instructions for the Follow-ups that You Need to Schedule     Discharge Follow-up with Specified Provider: Dr Yves Hawley; 2 Weeks   As directed      To: Dr Yves Hawley    Follow Up: 2 Weeks               Test Results Pending at Discharge  Pending Labs     Order Current Status    Tissue Pathology Exam In process            Bunny Martinez MD  03/10/22  07:13 EST    Time:

## 2022-03-11 ENCOUNTER — READMISSION MANAGEMENT (OUTPATIENT)
Dept: CALL CENTER | Facility: HOSPITAL | Age: 72
End: 2022-03-11

## 2022-03-11 NOTE — OUTREACH NOTE
Prep Survey    Flowsheet Row Responses   [facility] facility patient discharged from? [facility]   Is LACE score < 7 ? No   Emergency Room discharge w/ pulse ox? No   Eligibility Readm Mgmt   Discharge diagnosis NEPHRECTOMY PARTIAL LAPAROSCOPIC   Does the patient have one of the following disease processes/diagnoses(primary or secondary)? General Surgery   Does the patient have Home health ordered? No   Is there a DME ordered? No   Prep survey completed? Yes          MARIO NIETO - Registered Nurse

## 2022-03-11 NOTE — CASE MANAGEMENT/SOCIAL WORK
Case Management Discharge Note      Final Note: dc home    Provided Post Acute Provider List?: Refused  Refused Provider List Comment: Pt declined  Provided Post Acute Provider Quality & Resource List?: Refused  Refused Quality and Resource List Comment: Pt declined    Selected Continued Care - Discharged on 3/10/2022 Admission date: 3/9/2022 - Discharge disposition: Home or Self Care    Destination    No services have been selected for the patient.              Durable Medical Equipment    No services have been selected for the patient.              Dialysis/Infusion    No services have been selected for the patient.              Home Medical Care    No services have been selected for the patient.              Therapy    No services have been selected for the patient.              Community Resources    No services have been selected for the patient.              Community & DME    No services have been selected for the patient.                       Final Discharge Disposition Code: 01 - home or self-care

## 2022-03-15 ENCOUNTER — READMISSION MANAGEMENT (OUTPATIENT)
Dept: CALL CENTER | Facility: HOSPITAL | Age: 72
End: 2022-03-15

## 2022-03-15 NOTE — OUTREACH NOTE
General Surgery Week 1 Survey    Flowsheet Row Responses   [facility] facility patient discharged from? [facility]   Does the patient have one of the following disease processes/diagnoses(primary or secondary)? General Surgery   Week 1 attempt successful? No   Unsuccessful attempts Attempt 1          TRUMAN Bowles Registered Nurse

## 2022-03-18 ENCOUNTER — READMISSION MANAGEMENT (OUTPATIENT)
Dept: CALL CENTER | Facility: HOSPITAL | Age: 72
End: 2022-03-18

## 2022-03-18 NOTE — OUTREACH NOTE
General Surgery Week 1 Survey    Flowsheet Row Responses   [facility] patient discharged from? [facility]   Does the patient have one of the following disease processes/diagnoses(primary or secondary)? General Surgery   Week 1 attempt successful? Yes   Call start time 1118   Call end time 1121   List who call center can speak with pt   Meds reviewed with patient/caregiver? Yes   Is the patient having any side effects they believe may be caused by any medication additions or changes? No   Does the patient have all medications related to this admission filled (includes all antibiotics, pain medications, etc.) Yes   Is the patient taking all medications as directed (includes completed medication regime)? Yes   Does the patient have a follow up appointment scheduled with their surgeon? Yes   Has the patient kept scheduled appointments due by today? N/A   Comments Pt to see surgeon on March 22, 2022.   Has home health visited the patient within 72 hours of discharge? N/A   Psychosocial issues? No   Did the patient receive a copy of their discharge instructions? Yes   Nursing interventions Reviewed instructions with patient   What is the patient's perception of their health status since discharge? Improving   Is the patient/caregiver able to teach back steps to recovery at home? Set small, achievable goals for return to baseline health, Rest and rebuild strength, gradually increase activity, Weigh daily, Practice good oral hygiene, Eat a well-balance diet, Make a list of questions for surgeon's appointment   Is the patient/caregiver able to teach back the hierarchy of who to call/visit for symptoms/problems? PCP, Specialist, Home health nurse, Urgent Care, ED, 911 Yes   Week 1 call completed? Yes   Wrap up additional comments Pt reports feeling well. Pt has no pain. Appetite is good. Pt to see surgeon on March 22, 2022. Pt has no questions.          NELLY POLLARD - Registered Nurse

## 2022-03-24 ENCOUNTER — TRANSCRIBE ORDERS (OUTPATIENT)
Dept: ADMINISTRATIVE | Facility: HOSPITAL | Age: 72
End: 2022-03-24

## 2022-03-24 DIAGNOSIS — C64.2 MALIGNANT NEOPLASM OF LEFT KIDNEY, EXCEPT RENAL PELVIS: Primary | ICD-10-CM

## 2022-03-25 ENCOUNTER — HOSPITAL ENCOUNTER (EMERGENCY)
Facility: HOSPITAL | Age: 72
Discharge: HOME | End: 2022-03-25
Attending: EMERGENCY MEDICINE | Admitting: EMERGENCY MEDICINE

## 2022-03-25 ENCOUNTER — NURSE TRIAGE (OUTPATIENT)
Dept: CALL CENTER | Facility: HOSPITAL | Age: 72
End: 2022-03-25

## 2022-03-25 ENCOUNTER — APPOINTMENT (OUTPATIENT)
Dept: GENERAL RADIOLOGY | Facility: HOSPITAL | Age: 72
End: 2022-03-25

## 2022-03-25 ENCOUNTER — APPOINTMENT (OUTPATIENT)
Dept: CT IMAGING | Facility: HOSPITAL | Age: 72
End: 2022-03-25

## 2022-03-25 VITALS
BODY MASS INDEX: 41.96 KG/M2 | DIASTOLIC BLOOD PRESSURE: 70 MMHG | RESPIRATION RATE: 18 BRPM | HEART RATE: 68 BPM | OXYGEN SATURATION: 99 % | WEIGHT: 228 LBS | HEIGHT: 62 IN | TEMPERATURE: 98 F | SYSTOLIC BLOOD PRESSURE: 131 MMHG

## 2022-03-25 DIAGNOSIS — R06.00 DYSPNEA, UNSPECIFIED TYPE: Primary | ICD-10-CM

## 2022-03-25 LAB
ALBUMIN SERPL-MCNC: 3.5 G/DL (ref 3.5–5.2)
ALBUMIN/GLOB SERPL: 1.3 G/DL
ALP SERPL-CCNC: 83 U/L (ref 39–117)
ALT SERPL W P-5'-P-CCNC: 8 U/L (ref 1–33)
ANION GAP SERPL CALCULATED.3IONS-SCNC: 8.5 MMOL/L (ref 5–15)
AST SERPL-CCNC: 19 U/L (ref 1–32)
BASOPHILS # BLD AUTO: 0.04 10*3/MM3 (ref 0–0.2)
BASOPHILS NFR BLD AUTO: 0.6 % (ref 0–1.5)
BILIRUB SERPL-MCNC: 0.3 MG/DL (ref 0–1.2)
BUN SERPL-MCNC: 32 MG/DL (ref 8–23)
BUN/CREAT SERPL: 25 (ref 7–25)
CALCIUM SPEC-SCNC: 8.5 MG/DL (ref 8.6–10.5)
CHLORIDE SERPL-SCNC: 104 MMOL/L (ref 98–107)
CO2 SERPL-SCNC: 26.5 MMOL/L (ref 22–29)
CREAT SERPL-MCNC: 1.28 MG/DL (ref 0.57–1)
D DIMER PPP FEU-MCNC: 0.88 MCGFEU/ML (ref 0–0.46)
DEPRECATED RDW RBC AUTO: 46.8 FL (ref 37–54)
EGFRCR SERPLBLD CKD-EPI 2021: 44.3 ML/MIN/1.73
EOSINOPHIL # BLD AUTO: 0.2 10*3/MM3 (ref 0–0.4)
EOSINOPHIL NFR BLD AUTO: 3.2 % (ref 0.3–6.2)
ERYTHROCYTE [DISTWIDTH] IN BLOOD BY AUTOMATED COUNT: 13.2 % (ref 12.3–15.4)
GLOBULIN UR ELPH-MCNC: 2.7 GM/DL
GLUCOSE SERPL-MCNC: 136 MG/DL (ref 65–99)
HCT VFR BLD AUTO: 38.9 % (ref 34–46.6)
HGB BLD-MCNC: 12.1 G/DL (ref 12–15.9)
IMM GRANULOCYTES # BLD AUTO: 0.01 10*3/MM3 (ref 0–0.05)
IMM GRANULOCYTES NFR BLD AUTO: 0.2 % (ref 0–0.5)
LYMPHOCYTES # BLD AUTO: 2.56 10*3/MM3 (ref 0.7–3.1)
LYMPHOCYTES NFR BLD AUTO: 40.6 % (ref 19.6–45.3)
MCH RBC QN AUTO: 30.3 PG (ref 26.6–33)
MCHC RBC AUTO-ENTMCNC: 31.1 G/DL (ref 31.5–35.7)
MCV RBC AUTO: 97.5 FL (ref 79–97)
MONOCYTES # BLD AUTO: 0.58 10*3/MM3 (ref 0.1–0.9)
MONOCYTES NFR BLD AUTO: 9.2 % (ref 5–12)
NEUTROPHILS NFR BLD AUTO: 2.92 10*3/MM3 (ref 1.7–7)
NEUTROPHILS NFR BLD AUTO: 46.2 % (ref 42.7–76)
NRBC BLD AUTO-RTO: 0 /100 WBC (ref 0–0.2)
NT-PROBNP SERPL-MCNC: 299.1 PG/ML (ref 0–900)
PLATELET # BLD AUTO: 284 10*3/MM3 (ref 140–450)
PMV BLD AUTO: 9.8 FL (ref 6–12)
POTASSIUM SERPL-SCNC: 5.8 MMOL/L (ref 3.5–5.2)
PROT SERPL-MCNC: 6.2 G/DL (ref 6–8.5)
RBC # BLD AUTO: 3.99 10*6/MM3 (ref 3.77–5.28)
SODIUM SERPL-SCNC: 139 MMOL/L (ref 136–145)
WBC NRBC COR # BLD: 6.31 10*3/MM3 (ref 3.4–10.8)

## 2022-03-25 PROCEDURE — 0 IOPAMIDOL PER 1 ML: Performed by: EMERGENCY MEDICINE

## 2022-03-25 PROCEDURE — 80053 COMPREHEN METABOLIC PANEL: CPT | Performed by: EMERGENCY MEDICINE

## 2022-03-25 PROCEDURE — 85025 COMPLETE CBC W/AUTO DIFF WBC: CPT | Performed by: EMERGENCY MEDICINE

## 2022-03-25 PROCEDURE — 99283 EMERGENCY DEPT VISIT LOW MDM: CPT

## 2022-03-25 PROCEDURE — 83880 ASSAY OF NATRIURETIC PEPTIDE: CPT | Performed by: EMERGENCY MEDICINE

## 2022-03-25 PROCEDURE — 71045 X-RAY EXAM CHEST 1 VIEW: CPT

## 2022-03-25 PROCEDURE — 99282 EMERGENCY DEPT VISIT SF MDM: CPT | Performed by: EMERGENCY MEDICINE

## 2022-03-25 PROCEDURE — 71275 CT ANGIOGRAPHY CHEST: CPT

## 2022-03-25 PROCEDURE — 85379 FIBRIN DEGRADATION QUANT: CPT | Performed by: EMERGENCY MEDICINE

## 2022-03-25 RX ADMIN — IOPAMIDOL 100 ML: 755 INJECTION, SOLUTION INTRAVENOUS at 15:17

## 2022-03-25 RX ADMIN — SODIUM CHLORIDE, POTASSIUM CHLORIDE, SODIUM LACTATE AND CALCIUM CHLORIDE 500 ML: 600; 310; 30; 20 INJECTION, SOLUTION INTRAVENOUS at 15:03

## 2022-03-25 NOTE — DISCHARGE INSTRUCTIONS
Please follow-up with your primary care physician.  Please return to the emergency room if you have worsening symptoms, chest pain or for any other concerns.

## 2022-03-25 NOTE — ED PROVIDER NOTES
Subjective   History of Present Illness  71-year-old female referred to the ER by her urologist with concern for PE.  She had a recent partial nephrectomy and over the last couple days she has had some intermittent shortness of breath.  Today she had more shortness of breath with activity and her doctor sent her here.  He denies any chest pain denies any increased swelling in the legs, denies orthopnea denies PND.  There is no chills no other complaints  Review of Systems   Constitutional: Negative for chills and fever.   Respiratory: Positive for shortness of breath. Negative for cough and chest tightness.    Cardiovascular: Negative for chest pain, palpitations and leg swelling.   Gastrointestinal: Negative for nausea and vomiting.   Neurological: Negative for dizziness and weakness.       Past Medical History:   Diagnosis Date   • Agatston CAC score, <100     2017: 47   • Anemia    • Arthritis    • Broken bones    • Chronic kidney disease    • Depression    • Depression    • False positive stress test     2017 -- coronary artery CTA showed no significant CAD   • GERD (gastroesophageal reflux disease)    • Headache     occ   • Hearing loss     asim hearing aids   • History of blood clots     pt states no   • History of cardiovascular stress test     normal Cardiolite 9/2016   • Hypertension    • Hyperthyroidism    • Hypoglycemia     Rebound hypoglycemia   • Hypothyroid    • Hypothyroidism    • Incisional hernia     s/p surgical repair, 2014   • Incontinence in female    • Lower back pain    • Morbid obesity (HCC)     s/p remote gastric bypass   • Osteoarthritis    • Sinus bradycardia    • UTI (urinary tract infection)    • Varicose veins        Allergies   Allergen Reactions   • Baclofen Dizziness and Arrhythmia   • Morphine Itching and Other (See Comments)     Chest tightness, Abdominal Pain   • Nsaids Other (See Comments)     DUE TO CKD STAGE III   • Flagyl [Metronidazole] Other (See Comments)     headaches   •  Lodine [Etodolac] Hives     Blotches under the skin   • Macrodantin [Nitrofurantoin] Other (See Comments)     headaches   • Other Rash     LODINE  BLOTCHES ON SKIN        • Tetracyclines & Related Hives and Rash       Past Surgical History:   Procedure Laterality Date   • ANKLE ARTHRODESIS Right 01/23/2019    Right open ankle arthrodesis, harvest of proximal tibial bone graft as major graft, exostectomy, medial great toe-Dr. Santy Romero   • BACK SURGERY     • BLADDER SUSPENSION     • CARPAL TUNNEL RELEASE Left    • CHOLECYSTECTOMY     • COLONOSCOPY N/A 11/3/2017    Procedure: COLONOSCOPY; polypectomy;  Surgeon: Chevy Zuniga MD;  Location:  [facility];  Service:    • COLONOSCOPY N/A 03/25/2007    Normal-Dr. Timoteo Prado   • EXTERNAL FIXATOR APPLICATION     • EYE SURGERY  08/26/2020    Dr. Avalos   • GASTRIC BYPASS      1980s   • HERNIA REPAIR  2014   • LUMBAR FUSION Bilateral 02/04/2021   • NEPHRECTOMY PARTIAL Left 3/9/2022    Procedure: NEPHRECTOMY PARTIAL LAPAROSCOPIC;  Surgeon: Yves Hawley MD;  Location:  [facility] OR;  Service: Urology;  Laterality: Left;   • ORIF TIBIA/FIBULA FRACTURES Right 2005   • PARATHYROIDECTOMY N/A 11/20/2019    Procedure: left inferior parathyroid mediastinal resection and right inferior parathyroid resection, thyroid mass resection;  Surgeon: Gianna Knowles MD;  Location: [facility] MAIN OR;  Service: General   • TOTAL HIP ARTHROPLASTY Right 07/2021   • UPPER GASTROINTESTINAL ENDOSCOPY N/A 03/25/2007    No gross lesions in the esophagus, non-bleeding erythematous gastropathy, antrectomy with a small gastric pouch remnant, Billroth II anatomy with a gastrojejunostomy-Dr. Timoteo Prado       Family History   Problem Relation Age of Onset   • Aortic aneurysm Father         abdominal   • Heart disease Father    • Hypertension Brother    • Stroke Brother    • Intracerebral hemorrhage Brother    • Heart disease Brother    • Heart disease Maternal Grandfather    • Stroke  Paternal Grandmother    • Heart disease Paternal Grandfather    • Diabetes Mother    • Heart disease Mother    • Malig Hyperthermia Neg Hx        Social History     Socioeconomic History   • Marital status:    Tobacco Use   • Smoking status: Never Smoker   • Smokeless tobacco: Never Used   • Tobacco comment: secondary smoke   Vaping Use   • Vaping Use: Never used   Substance and Sexual Activity   • Alcohol use: No     Comment: very seldom   • Drug use: No   • Sexual activity: Not Currently     Partners: Male     Birth control/protection: Post-menopausal, None     Comment:  is impotent           Objective    ED Triage Vitals [03/25/22 1416]   Temp Heart Rate Resp BP SpO2   98 °F (36.7 °C) 68 18 146/55 100 %      Temp src Heart Rate Source Patient Position BP Location FiO2 (%)   -- -- -- -- --       Physical Exam  INITIAL VITAL SIGNS: Reviewed by me.  Pulse ox normal  GENERAL: Alert and interactive. No acute distress.  HEAD: Head is normocephalic.  EYES: EOMI. PERRL. No scleral icterus. No conjunctival injection.  ENT: Moist mucous membranes.   NECK: Supple. Full range of motion.  RESPIRATORY: No tachypnea. Clear breath sounds bilaterally. No wheezing. No rales. No rhonchi.  CV: Regular rate and rhythm. No murmurs. No rubs or gallops.  ABDOMEN: Soft, nondistended, nontender.   BACK:  No obvious deformity.  EXTREMITIES: No deformity. No clubbing or cyanosis. No edema.   SKIN: Warm and dry. No diaphoresis. No obvious rashes.   NEUROLOGIC: Alert and oriented. Face is symmetric. Speech is normal.      Results for orders placed or performed during the hospital encounter of 03/25/22   Comprehensive Metabolic Panel    Specimen: Blood   Result Value Ref Range    Glucose 136 (H) 65 - 99 mg/dL    BUN 32 (H) 8 - 23 mg/dL    Creatinine 1.28 (H) 0.57 - 1.00 mg/dL    Sodium 139 136 - 145 mmol/L    Potassium 5.8 (H) 3.5 - 5.2 mmol/L    Chloride 104 98 - 107 mmol/L    CO2 26.5 22.0 - 29.0 mmol/L    Calcium 8.5 (L) 8.6 -  10.5 mg/dL    Total Protein 6.2 6.0 - 8.5 g/dL    Albumin 3.50 3.50 - 5.20 g/dL    ALT (SGPT) 8 1 - 33 U/L    AST (SGOT) 19 1 - 32 U/L    Alkaline Phosphatase 83 39 - 117 U/L    Total Bilirubin 0.3 0.0 - 1.2 mg/dL    Globulin 2.7 gm/dL    A/G Ratio 1.3 g/dL    BUN/Creatinine Ratio 25.0 7.0 - 25.0    Anion Gap 8.5 5.0 - 15.0 mmol/L    eGFR 44.3 (L) >60.0 mL/min/1.73   D-dimer, Quantitative    Specimen: Blood   Result Value Ref Range    D-Dimer, Quantitative 0.88 (H) 0.00 - 0.46 MCGFEU/mL   CBC Auto Differential    Specimen: Blood   Result Value Ref Range    WBC 6.31 3.40 - 10.80 10*3/mm3    RBC 3.99 3.77 - 5.28 10*6/mm3    Hemoglobin 12.1 12.0 - 15.9 g/dL    Hematocrit 38.9 34.0 - 46.6 %    MCV 97.5 (H) 79.0 - 97.0 fL    MCH 30.3 26.6 - 33.0 pg    MCHC 31.1 (L) 31.5 - 35.7 g/dL    RDW 13.2 12.3 - 15.4 %    RDW-SD 46.8 37.0 - 54.0 fl    MPV 9.8 6.0 - 12.0 fL    Platelets 284 140 - 450 10*3/mm3    Neutrophil % 46.2 42.7 - 76.0 %    Lymphocyte % 40.6 19.6 - 45.3 %    Monocyte % 9.2 5.0 - 12.0 %    Eosinophil % 3.2 0.3 - 6.2 %    Basophil % 0.6 0.0 - 1.5 %    Immature Grans % 0.2 0.0 - 0.5 %    Neutrophils, Absolute 2.92 1.70 - 7.00 10*3/mm3    Lymphocytes, Absolute 2.56 0.70 - 3.10 10*3/mm3    Monocytes, Absolute 0.58 0.10 - 0.90 10*3/mm3    Eosinophils, Absolute 0.20 0.00 - 0.40 10*3/mm3    Basophils, Absolute 0.04 0.00 - 0.20 10*3/mm3    Immature Grans, Absolute 0.01 0.00 - 0.05 10*3/mm3    nRBC 0.0 0.0 - 0.2 /100 WBC   BNP    Specimen: Blood   Result Value Ref Range    proBNP 299.1 0.0 - 900.0 pg/mL     XR Chest 1 View    Result Date: 3/25/2022  CR Chest 1 Vw INDICATION: Shortness of breath for one week. Worsening over the past 2 days. History of hypertension. COMPARISON:  Two-view chest 9/9/2016 FINDINGS: Portable AP view(s) of the chest.  Heart size within normal limits. The lungs are clear. No effusions or pneumothorax. Prominent and tortuous aorta suggesting atherosclerotic change. Multiple surgical clips upper  abdomen. Calcified granuloma right midlung zone.     No acute cardiopulmonary findings. Signer Name: MARGUERITE Hawley MD  Signed: 3/25/2022 3:05 PM  Workstation Name: [id number]  [facility]    CT Angiogram Chest    Result Date: 3/25/2022  PROCEDURE: CTA Chest COMPARISON: No relevant comparison or correlation studies available at time of dictation. INDICATIONS: PE suspected, low/intermediate prob, positive D-dimer Pt states soa x 3 days; Non smoker; Positive d-dimer TECHNIQUE:   CTA of the chest is performed with IV contrast. Multiplanar MIP and 3-D reconstructions  images are performed on separate workstation under concurrent radiologist supervision per protocol and reviewed. Radiation dose reduction techniques included automated exposure control or exposure modulation based on body size. Count of known CT and cardiac nuc med studies performed in previous 12 months: 0.  FINDINGS: Study is performed for assessment for pulmonary embolus. There is no evidence of pulmonary embolus. Lungs are grossly clear, no focal consolidation seen. Minor patchy scarring suggested in the left lung. Benign calcified granulomata seen in the right lung. Clips seen in the left upper quadrant producing some mild streak artifact. Elevation of left hemidiaphragm.  No gross acute bony abnormality seen.      1.  No evidence of PE.  2.  No definite infiltrate. Patchy scarring seen along with benign old granulomatous disease.  Signer Name: Melonie Jarquin MD  Signed: 3/25/2022 3:43 PM  Workstation Name: [id number]  [facility]      Procedures           ED Course  ED Course as of 03/25/22 1600   Fri Mar 25, 2022   1415 Well-appearing 71-year-old female sent here for evaluation of PE.  On exam is in no distress has normal heart rate normal O2 sat but says that her shortness of breath is mostly with exertion.  She has normal heart sounds normal lung sounds without any in-store crackles.  Will check  labs including dimer also check a BNP though I do not expect that to be elevated. [RO]   1511 Dimer elevated will get a CT scan. [RO]   1559 Work-up reveals no evidence of PE, normal BNP no pulmonary edema think she be safely discharged home. [RO]      ED Course User Index  [RO] Smooth Logan MD                                                 [facility]    Final diagnoses:   Dyspnea, unspecified type       ED Disposition  ED Disposition     ED Disposition   Discharge    Condition   Good    Comment   --             Chey Ledezma MD  [address] [place] [id number] [phone]    Call   To schedule follow-up appointment         Medication List      No changes were made to your prescriptions during this visit.          Smooth Logan MD  03/25/22 1600

## 2022-03-25 NOTE — TELEPHONE ENCOUNTER
"    Reason for Disposition  • Sounds like a serious complication to the triager    Additional Information  • Negative: Sounds like a life-threatening emergency to the triager  • Negative: Chest pain  • Negative: Difficulty breathing  • Negative: Acting confused (e.g., disoriented, slurred speech) or excessively sleepy  • Negative: Surgical incision symptoms and questions  • Negative: [1] Discomfort (pain, burning or stinging) when passing urine AND [2] male  • Negative: [1] Discomfort (pain, burning or stinging) when passing urine AND [2] female  • Negative: Constipation  • Negative: New or worsening leg (calf, thigh) pain  • Negative: New or worsening leg swelling  • Negative: Dizziness is severe, or persists > 24 hours after surgery  • Negative: Pain, redness, swelling, or pus at IV Site  • Negative: Symptoms arising from use of a urinary catheter (May or Coude)  • Negative: Cast problems or questions  • Negative: Medication question  • Negative: [1] Widespread rash AND [2] bright red, sunburn-like  • Negative: [1] SEVERE headache AND [2] after spinal (epidural) anesthesia  • Negative: [1] Vomiting AND [2] persists > 4 hours  • Negative: [1] Vomiting AND [2] abdomen looks much more swollen than usual  • Negative: [1] Drinking very little AND [2] dehydration suspected (e.g., no urine > 12 hours, very dry mouth, very lightheaded)  • Negative: Patient sounds very sick or weak to the triager    Answer Assessment - Initial Assessment Questions  1. SYMPTOM: \"What's the main symptom you're concerned about?\" (e.g., pain, fever, vomiting)      Shortness of breath  2. ONSET: \"When did SOA  start?\"      Tuesday of this week  3. SURGERY: \"What surgery was performed?\"      nephrectomy  4. DATE of SURGERY: \"When was surgery performed?\"       3/9  5. ANESTHESIA: \" What type of anesthesia did you have?\" (e.g., general, spinal, epidural, local)      unknown  6. PAIN: \"Is there any pain?\" If Yes, ask: \"How bad is it?\"  (Scale " "1-10; or mild, moderate, severe)      denies  7. FEVER: \"Do you have a fever?\" If Yes, ask: \"What is your temperature, how was it measured, and when did it start?\"      na  8. VOMITING: \"Is there any vomiting?\" If yes, ask: \"How many times?\"      na  9. BLEEDING: \"Is there any bleeding?\" If Yes, ask: \"How much?\" and \"Where?\"      na  10. OTHER SYMPTOMS: \"Do you have any other symptoms?\" (e.g., drainage from wound, painful urination, constipation)        Severe shortness of breath and feeling faint    Protocols used: POST-OP SYMPTOMS AND QUESTIONS-ADULT-      "

## 2022-03-28 ENCOUNTER — READMISSION MANAGEMENT (OUTPATIENT)
Dept: CALL CENTER | Facility: HOSPITAL | Age: 72
End: 2022-03-28

## 2022-03-28 NOTE — OUTREACH NOTE
General Surgery Week 2 Survey    Flowsheet Row Responses   [facility] patient discharged from? [facility]   Does the patient have one of the following disease processes/diagnoses(primary or secondary)? General Surgery   Week 2 attempt successful? No   Unsuccessful attempts Attempt 1          TRUMAN Bowles Registered Nurse

## 2022-03-31 ENCOUNTER — READMISSION MANAGEMENT (OUTPATIENT)
Dept: CALL CENTER | Facility: HOSPITAL | Age: 72
End: 2022-03-31

## 2022-03-31 NOTE — OUTREACH NOTE
General Surgery Week 2 Survey    Flowsheet Row Responses   [facility] patient discharged from? [facility]   Does the patient have one of the following disease processes/diagnoses(primary or secondary)? General Surgery   Week 2 attempt successful? Yes   Call start time 1241   Call end time 1244   Discharge diagnosis NEPHRECTOMY PARTIAL LAPAROSCOPIC   Person spoke with today (if not patient) and relationship patient   Meds reviewed with patient/caregiver? Yes   Is the patient having any side effects they believe may be caused by any medication additions or changes? No   Does the patient have all medications related to this admission filled (includes all antibiotics, pain medications, etc.) Yes   Is the patient taking all medications as directed (includes completed medication regime)? Yes   Does the patient have a follow up appointment scheduled with their surgeon? Yes   Has the patient kept scheduled appointments due by today? Yes   Psychosocial issues? No   What is the patient's perception of their health status since discharge? Improving   Nursing interventions Nurse provided patient education   Is the patient /caregiver able to teach back basic post-op care? Take showers only when approved by MD-sponge bathe until then, No tub bath, swimming, or hot tub until instructed by MD, Keep incision areas clean,dry and protected, Lifting as instructed by MD in discharge instructions   Is the patient/caregiver able to teach back signs and symptoms of incisional infection? Increased redness, swelling or pain at the incisonal site, Increased drainage or bleeding, Incisional warmth, Pus or odor from incision, Fever   Is the patient/caregiver able to teach back steps to recovery at home? Rest and rebuild strength, gradually increase activity, Eat a well-balance diet   Week 2 call completed? Yes   Wrap up additional comments Pt states she is doing better. No questions/concerns.          JAYDEN NIETO - Registered Nurse

## 2022-04-07 ENCOUNTER — TRANSCRIBE ORDERS (OUTPATIENT)
Dept: ADMINISTRATIVE | Facility: HOSPITAL | Age: 72
End: 2022-04-07

## 2022-04-07 DIAGNOSIS — R42 POSTURAL DIZZINESS WITH PRESYNCOPE: Primary | ICD-10-CM

## 2022-04-07 DIAGNOSIS — R55 POSTURAL DIZZINESS WITH PRESYNCOPE: Primary | ICD-10-CM

## 2022-04-08 ENCOUNTER — READMISSION MANAGEMENT (OUTPATIENT)
Dept: CALL CENTER | Facility: HOSPITAL | Age: 72
End: 2022-04-08

## 2022-04-08 NOTE — OUTREACH NOTE
General Surgery Week 3 Survey    Flowsheet Row Responses   [facility] [place], operated by [facility] patient discharged from? [facility]   Does the patient have one of the following disease processes/diagnoses(primary or secondary)? General Surgery   Week 3 attempt successful? Yes   Call start time 1626   Call end time 1636   Discharge diagnosis NEPHRECTOMY PARTIAL LAPAROSCOPIC   Meds reviewed with patient/caregiver? Yes   Is the patient having any side effects they believe may be caused by any medication additions or changes? No   Does the patient have all medications related to this admission filled (includes all antibiotics, pain medications, etc.) Yes   Is the patient taking all medications as directed (includes completed medication regime)? Yes   Medication comments Taken off BP medication yesterday due to weakness and SOA, changed Thyroid to 115mcg down from 125mcg.    Does the patient have a follow up appointment scheduled with their surgeon? Yes   Has the patient kept scheduled appointments due by today? Yes   Has home health visited the patient within 72 hours of discharge? N/A   Psychosocial issues? No   Did the patient receive a copy of their discharge instructions? Yes   Nursing interventions Reviewed instructions with patient   What is the patient's perception of their health status since discharge? New symptoms unrelated to diagnosis  [SOA and dizziness that developed after discharge. saw her PCP has Holter monitor and US of carotid. changed her BP med and decreased Thyroid replacement. ]   Nursing interventions Nurse provided patient education   Is the patient /caregiver able to teach back basic post-op care? Take showers only when approved by MD-sponge bathkavon until then, Keep incision areas clean,dry and protected, Lifting as instructed by MD in discharge instructions   Is the patient/caregiver able to teach back signs and symptoms of incisional infection? Increased redness, swelling or pain at the incisonal site, Increased  drainage or bleeding, Incisional warmth, Fever   Is the patient/caregiver able to teach back steps to recovery at home? Rest and rebuild strength, gradually increase activity, Eat a well-balance diet, Make a list of questions for surgeon's appointment   If the patient is a current smoker, are they able to teach back resources for cessation? Not a smoker   Is the patient/caregiver able to teach back the hierarchy of who to call/visit for symptoms/problems? PCP, Specialist, Home health nurse, Urgent Care, ED, 911 Yes   Additional teach back comments States she is having back pain when she stands, gets really bad when she is standing. Advised to contact her surgeon about this symptoms.    Week 3 call completed? Yes   Wrap up additional comments Pt states she is having back pain on left when she stands. Having dizziness and SOA that developed after discharge.  Saw her PCP yesterday stopped her BP medication due to dizziness. Advised to call for oncall  doctor to report the left back pain she has when she stands.            MELINDA POLLARD - Registered Nurse

## 2022-04-15 ENCOUNTER — HOSPITAL ENCOUNTER (OUTPATIENT)
Dept: CARDIOLOGY | Facility: HOSPITAL | Age: 72
Discharge: HOME | End: 2022-04-15
Admitting: FAMILY MEDICINE

## 2022-04-15 ENCOUNTER — APPOINTMENT (OUTPATIENT)
Dept: ULTRASOUND IMAGING | Facility: HOSPITAL | Age: 72
End: 2022-04-15

## 2022-04-15 ENCOUNTER — TRANSCRIBE ORDERS (OUTPATIENT)
Dept: ADMINISTRATIVE | Facility: HOSPITAL | Age: 72
End: 2022-04-15

## 2022-04-15 DIAGNOSIS — R42 POSTURAL DIZZINESS WITH PRESYNCOPE: ICD-10-CM

## 2022-04-15 DIAGNOSIS — R55 SYNCOPE AND COLLAPSE: Primary | ICD-10-CM

## 2022-04-15 DIAGNOSIS — R55 POSTURAL DIZZINESS WITH PRESYNCOPE: ICD-10-CM

## 2022-04-15 PROCEDURE — 93242 EXT ECG>48HR<7D RECORDING: CPT

## 2022-04-19 ENCOUNTER — READMISSION MANAGEMENT (OUTPATIENT)
Dept: CALL CENTER | Facility: HOSPITAL | Age: 72
End: 2022-04-19

## 2022-04-19 NOTE — OUTREACH NOTE
General Surgery Week 4 Survey    Flowsheet Row Responses   [facility] patient discharged from? [facility]   Does the patient have one of the following disease processes/diagnoses(primary or secondary)? General Surgery   Week 4 attempt successful? No          SHANNAN W - Registered Nurse

## 2022-04-22 ENCOUNTER — HOSPITAL ENCOUNTER (OUTPATIENT)
Dept: ULTRASOUND IMAGING | Facility: HOSPITAL | Age: 72
Discharge: HOME | End: 2022-04-22
Admitting: FAMILY MEDICINE

## 2022-04-22 DIAGNOSIS — R42 POSTURAL DIZZINESS WITH PRESYNCOPE: ICD-10-CM

## 2022-04-22 DIAGNOSIS — R55 POSTURAL DIZZINESS WITH PRESYNCOPE: ICD-10-CM

## 2022-04-22 LAB
MAXIMAL PREDICTED HEART RATE: 147 BPM
STRESS TARGET HR: 125 BPM

## 2022-04-22 PROCEDURE — 93244 EXT ECG>48HR<7D REV&INTERPJ: CPT | Performed by: INTERNAL MEDICINE

## 2022-04-22 PROCEDURE — 93880 EXTRACRANIAL BILAT STUDY: CPT

## 2022-08-02 ENCOUNTER — PREP FOR SURGERY (OUTPATIENT)
Dept: OTHER | Facility: HOSPITAL | Age: 72
End: 2022-08-02

## 2022-08-02 ENCOUNTER — TELEPHONE (OUTPATIENT)
Dept: GASTROENTEROLOGY | Facility: CLINIC | Age: 72
End: 2022-08-02

## 2022-08-02 DIAGNOSIS — Z86.010 PERSONAL HISTORY OF COLONIC POLYPS: Primary | ICD-10-CM

## 2022-08-02 NOTE — TELEPHONE ENCOUNTER
FAST TRACK - 5 YEAR RECALL 11/2022  LAST COLONOSCOPY 11/03/2017  PERSONAL HISTORY POLYPS  NO FAMILY HISTORY CRC/P  SCHEDULE AT [facility].

## 2022-08-04 PROBLEM — Z86.010 PERSONAL HISTORY OF COLONIC POLYPS: Status: ACTIVE | Noted: 2022-08-04

## 2022-08-04 NOTE — TELEPHONE ENCOUNTER
Return call from patient.  Scheduled at [place] [facility] 01/04/2023 at 1:30pm - arrive 12:30pm.  Will mail instructions.

## 2022-09-13 ENCOUNTER — LAB (OUTPATIENT)
Dept: LAB | Facility: HOSPITAL | Age: 72
End: 2022-09-13

## 2022-09-13 ENCOUNTER — TRANSCRIBE ORDERS (OUTPATIENT)
Dept: ADMINISTRATIVE | Facility: HOSPITAL | Age: 72
End: 2022-09-13

## 2022-09-13 ENCOUNTER — HOSPITAL ENCOUNTER (OUTPATIENT)
Dept: CT IMAGING | Facility: HOSPITAL | Age: 72
Discharge: HOME | End: 2022-09-13

## 2022-09-13 ENCOUNTER — HOSPITAL ENCOUNTER (OUTPATIENT)
Dept: GENERAL RADIOLOGY | Facility: HOSPITAL | Age: 72
Discharge: HOME | End: 2022-09-13

## 2022-09-13 DIAGNOSIS — C64.2 MALIGNANT NEOPLASM OF LEFT KIDNEY, EXCEPT RENAL PELVIS: ICD-10-CM

## 2022-09-13 DIAGNOSIS — N18.2 CHRONIC KIDNEY DISEASE, STAGE II (MILD): ICD-10-CM

## 2022-09-13 DIAGNOSIS — M54.50 LOW BACK PAIN, UNSPECIFIED BACK PAIN LATERALITY, UNSPECIFIED CHRONICITY, UNSPECIFIED WHETHER SCIATICA PRESENT: ICD-10-CM

## 2022-09-13 DIAGNOSIS — E87.5 HYPERPOTASSEMIA: ICD-10-CM

## 2022-09-13 DIAGNOSIS — M54.50 LOW BACK PAIN, UNSPECIFIED BACK PAIN LATERALITY, UNSPECIFIED CHRONICITY, UNSPECIFIED WHETHER SCIATICA PRESENT: Primary | ICD-10-CM

## 2022-09-13 DIAGNOSIS — E87.5 HYPERPOTASSEMIA: Primary | ICD-10-CM

## 2022-09-13 DIAGNOSIS — N18.2 CHRONIC KIDNEY DISEASE, STAGE II (MILD): Primary | ICD-10-CM

## 2022-09-13 LAB
ALBUMIN SERPL-MCNC: 3.8 G/DL (ref 3.5–5.2)
ANION GAP SERPL CALCULATED.3IONS-SCNC: 11 MMOL/L (ref 5–15)
BACTERIA UR QL AUTO: NORMAL /HPF
BILIRUB UR QL STRIP: NEGATIVE
BUN SERPL-MCNC: 15 MG/DL (ref 8–23)
BUN/CREAT SERPL: 11.7 (ref 7–25)
CALCIUM SPEC-SCNC: 8.3 MG/DL (ref 8.6–10.5)
CHLORIDE SERPL-SCNC: 106 MMOL/L (ref 98–107)
CLARITY UR: CLEAR
CO2 SERPL-SCNC: 23 MMOL/L (ref 22–29)
COLOR UR: YELLOW
CREAT SERPL-MCNC: 1.28 MG/DL (ref 0.57–1)
DEPRECATED RDW RBC AUTO: 40.5 FL (ref 37–54)
EGFRCR SERPLBLD CKD-EPI 2021: 44.1 ML/MIN/1.73
ERYTHROCYTE [DISTWIDTH] IN BLOOD BY AUTOMATED COUNT: 12.6 % (ref 12.3–15.4)
GLUCOSE SERPL-MCNC: 133 MG/DL (ref 65–99)
GLUCOSE UR STRIP-MCNC: NEGATIVE MG/DL
HCT VFR BLD AUTO: 40.1 % (ref 34–46.6)
HGB BLD-MCNC: 13 G/DL (ref 12–15.9)
HGB UR QL STRIP.AUTO: NEGATIVE
HYALINE CASTS UR QL AUTO: NORMAL /LPF
KETONES UR QL STRIP: NEGATIVE
LEUKOCYTE ESTERASE UR QL STRIP.AUTO: ABNORMAL
MCH RBC QN AUTO: 28.3 PG (ref 26.6–33)
MCHC RBC AUTO-ENTMCNC: 32.4 G/DL (ref 31.5–35.7)
MCV RBC AUTO: 87.4 FL (ref 79–97)
NITRITE UR QL STRIP: NEGATIVE
PH UR STRIP.AUTO: 6 [PH] (ref 5–8)
PHOSPHATE SERPL-MCNC: 5.1 MG/DL (ref 2.5–4.5)
PLATELET # BLD AUTO: 343 10*3/MM3 (ref 140–450)
PMV BLD AUTO: 10.7 FL (ref 6–12)
POTASSIUM SERPL-SCNC: 4 MMOL/L (ref 3.5–5.2)
PROT UR QL STRIP: NEGATIVE
RBC # BLD AUTO: 4.59 10*6/MM3 (ref 3.77–5.28)
RBC # UR STRIP: NORMAL /HPF
REF LAB TEST METHOD: NORMAL
SODIUM SERPL-SCNC: 140 MMOL/L (ref 136–145)
SP GR UR STRIP: 1.01 (ref 1–1.03)
SQUAMOUS #/AREA URNS HPF: NORMAL /HPF
UROBILINOGEN UR QL STRIP: ABNORMAL
WBC # UR STRIP: NORMAL /HPF
WBC NRBC COR # BLD: 6.63 10*3/MM3 (ref 3.4–10.8)

## 2022-09-13 PROCEDURE — 81001 URINALYSIS AUTO W/SCOPE: CPT

## 2022-09-13 PROCEDURE — 80069 RENAL FUNCTION PANEL: CPT

## 2022-09-13 PROCEDURE — 72110 X-RAY EXAM L-2 SPINE 4/>VWS: CPT

## 2022-09-13 PROCEDURE — 85027 COMPLETE CBC AUTOMATED: CPT

## 2022-09-13 PROCEDURE — 74176 CT ABD & PELVIS W/O CONTRAST: CPT

## 2022-09-13 PROCEDURE — 36415 COLL VENOUS BLD VENIPUNCTURE: CPT

## 2022-09-28 ENCOUNTER — TRANSCRIBE ORDERS (OUTPATIENT)
Dept: ADMINISTRATIVE | Facility: HOSPITAL | Age: 72
End: 2022-09-28

## 2022-09-28 DIAGNOSIS — C64.2 MALIGNANT NEOPLASM OF LEFT KIDNEY, EXCEPT RENAL PELVIS: Primary | ICD-10-CM

## 2022-10-05 ENCOUNTER — APPOINTMENT (OUTPATIENT)
Dept: CT IMAGING | Facility: HOSPITAL | Age: 72
End: 2022-10-05

## 2022-10-17 ENCOUNTER — LAB (OUTPATIENT)
Dept: LAB | Facility: HOSPITAL | Age: 72
End: 2022-10-17

## 2022-10-17 ENCOUNTER — TRANSCRIBE ORDERS (OUTPATIENT)
Dept: ADMINISTRATIVE | Facility: HOSPITAL | Age: 72
End: 2022-10-17

## 2022-10-17 DIAGNOSIS — N18.2 CHRONIC KIDNEY DISEASE, STAGE II (MILD): ICD-10-CM

## 2022-10-17 DIAGNOSIS — N18.2 CHRONIC KIDNEY DISEASE, STAGE II (MILD): Primary | ICD-10-CM

## 2022-10-17 LAB
ALBUMIN SERPL-MCNC: 3.5 G/DL (ref 3.5–5.2)
ANION GAP SERPL CALCULATED.3IONS-SCNC: 12.6 MMOL/L (ref 5–15)
BUN SERPL-MCNC: 23 MG/DL (ref 8–23)
BUN/CREAT SERPL: 17.8 (ref 7–25)
CALCIUM SPEC-SCNC: 8.5 MG/DL (ref 8.6–10.5)
CHLORIDE SERPL-SCNC: 102 MMOL/L (ref 98–107)
CO2 SERPL-SCNC: 23.4 MMOL/L (ref 22–29)
CREAT SERPL-MCNC: 1.29 MG/DL (ref 0.57–1)
EGFRCR SERPLBLD CKD-EPI 2021: 43.6 ML/MIN/1.73
GLUCOSE SERPL-MCNC: 87 MG/DL (ref 65–99)
PHOSPHATE SERPL-MCNC: 4.8 MG/DL (ref 2.5–4.5)
POTASSIUM SERPL-SCNC: 4.7 MMOL/L (ref 3.5–5.2)
SODIUM SERPL-SCNC: 138 MMOL/L (ref 136–145)

## 2022-10-17 PROCEDURE — 80069 RENAL FUNCTION PANEL: CPT

## 2022-10-17 PROCEDURE — 36415 COLL VENOUS BLD VENIPUNCTURE: CPT

## 2022-12-19 NOTE — PROGRESS NOTES
RM:________     PCP: Chey Ledezma MD    : 1950  AGE: 72 y.o.  EST PATIENT   REASON FOR VISIT/  CC:    BP Readings from Last 3 Encounters:   22 131/70   03/10/22 108/69   22 118/64        WT: ____________ BP: __________L __________R HR______    CHEST PAIN: _____________    SOA: _____________PALPS: _______________     LIGHTHEADED: ___________FATIGUE: ________________ EDEMA __________    ALLERGIES:Baclofen, Morphine, Nsaids, Flagyl [metronidazole], Lodine [etodolac], Macrodantin [nitrofurantoin], Other, and Tetracyclines & related SMOKING HISTORY:  Social History     Tobacco Use   • Smoking status: Never   • Smokeless tobacco: Never   • Tobacco comments:     secondary smoke   Vaping Use   • Vaping Use: Never used   Substance Use Topics   • Alcohol use: No     Comment: very seldom   • Drug use: No     CAFFEINE USE_________________  ALCOHOL ______________________    Below is the patient's most recent value for Albumin, ALT, AST, BUN, Calcium, Chloride, Cholesterol, CO2, Creatinine, GFR, Glucose, HDL, Hematocrit, Hemoglobin, Hemoglobin A1C, LDL, Magnesium, Phosphorus, Platelets, Potassium, PSA, Sodium, Triglycerides, TSH and WBC.   Lab Results   Component Value Date    ALBUMIN 3.50 10/17/2022    ALT 8 2022    AST 19 2022    BUN 23 10/17/2022    CALCIUM 8.5 (L) 10/17/2022     10/17/2022    CO2 23.4 10/17/2022    CREATININE 1.29 (H) 10/17/2022    GLU 88 2020    HCT 40.1 2022    HGB 13.0 2022    HGBA1C 5.20 2022    MG 2.3 2021    PHOS 4.8 (H) 10/17/2022     2022    K 4.7 10/17/2022     10/17/2022    TSH 11.290 (H) 2017    WBC 6.63 2022          NEW DIAGNOSIS/ SURGERY/ HOSP OR ED VISITS: ______________________    __________________________________________________________________      RECENT LABS OR DIAGNOSTIC TESTING:   _____________________________    __________________________________________________________________      ASSESSMENT/ PLAN: _______________________________________________    __________________________________________________________________

## 2023-01-03 ENCOUNTER — ANESTHESIA EVENT (OUTPATIENT)
Dept: PERIOP | Facility: HOSPITAL | Age: 73
End: 2023-01-03
Payer: MEDICARE

## 2023-01-04 ENCOUNTER — HOSPITAL ENCOUNTER (OUTPATIENT)
Facility: HOSPITAL | Age: 73
Setting detail: HOSPITAL OUTPATIENT SURGERY
Discharge: HOME | End: 2023-01-04
Attending: INTERNAL MEDICINE | Admitting: NURSE ANESTHETIST, CERTIFIED REGISTERED
Payer: MEDICARE

## 2023-01-04 ENCOUNTER — ANESTHESIA (OUTPATIENT)
Dept: PERIOP | Facility: HOSPITAL | Age: 73
End: 2023-01-04
Payer: MEDICARE

## 2023-01-04 VITALS
OXYGEN SATURATION: 98 % | SYSTOLIC BLOOD PRESSURE: 144 MMHG | WEIGHT: 237.4 LBS | RESPIRATION RATE: 22 BRPM | DIASTOLIC BLOOD PRESSURE: 95 MMHG | BODY MASS INDEX: 43.42 KG/M2 | TEMPERATURE: 98.3 F | HEART RATE: 57 BPM

## 2023-01-04 DIAGNOSIS — Z86.010 PERSONAL HISTORY OF COLONIC POLYPS: ICD-10-CM

## 2023-01-04 PROCEDURE — 25010000002 PROPOFOL 200 MG/20ML EMULSION: Performed by: NURSE ANESTHETIST, CERTIFIED REGISTERED

## 2023-01-04 PROCEDURE — 88305 TISSUE EXAM BY PATHOLOGIST: CPT | Performed by: INTERNAL MEDICINE

## 2023-01-04 PROCEDURE — 45380 COLONOSCOPY AND BIOPSY: CPT | Performed by: INTERNAL MEDICINE

## 2023-01-04 RX ORDER — SODIUM CHLORIDE 9 MG/ML
40 INJECTION, SOLUTION INTRAVENOUS AS NEEDED
Status: DISCONTINUED | OUTPATIENT
Start: 2023-01-04 | End: 2023-01-04 | Stop reason: HOSPADM

## 2023-01-04 RX ORDER — SODIUM CHLORIDE 0.9 % (FLUSH) 0.9 %
10 SYRINGE (ML) INJECTION AS NEEDED
Status: DISCONTINUED | OUTPATIENT
Start: 2023-01-04 | End: 2023-01-04 | Stop reason: HOSPADM

## 2023-01-04 RX ORDER — LIDOCAINE HYDROCHLORIDE 20 MG/ML
INJECTION, SOLUTION INTRAVENOUS AS NEEDED
Status: DISCONTINUED | OUTPATIENT
Start: 2023-01-04 | End: 2023-01-04 | Stop reason: SURG

## 2023-01-04 RX ORDER — SODIUM CHLORIDE 0.9 % (FLUSH) 0.9 %
10 SYRINGE (ML) INJECTION EVERY 12 HOURS SCHEDULED
Status: DISCONTINUED | OUTPATIENT
Start: 2023-01-04 | End: 2023-01-04 | Stop reason: HOSPADM

## 2023-01-04 RX ORDER — PROPOFOL 10 MG/ML
INJECTION, EMULSION INTRAVENOUS AS NEEDED
Status: DISCONTINUED | OUTPATIENT
Start: 2023-01-04 | End: 2023-01-04 | Stop reason: SURG

## 2023-01-04 RX ORDER — SODIUM CHLORIDE, SODIUM LACTATE, POTASSIUM CHLORIDE, CALCIUM CHLORIDE 600; 310; 30; 20 MG/100ML; MG/100ML; MG/100ML; MG/100ML
9 INJECTION, SOLUTION INTRAVENOUS CONTINUOUS
Status: DISCONTINUED | OUTPATIENT
Start: 2023-01-04 | End: 2023-01-04 | Stop reason: HOSPADM

## 2023-01-04 RX ORDER — LIDOCAINE HYDROCHLORIDE 10 MG/ML
0.5 INJECTION, SOLUTION EPIDURAL; INFILTRATION; INTRACAUDAL; PERINEURAL ONCE AS NEEDED
Status: DISCONTINUED | OUTPATIENT
Start: 2023-01-04 | End: 2023-01-04 | Stop reason: HOSPADM

## 2023-01-04 RX ADMIN — SODIUM CHLORIDE, POTASSIUM CHLORIDE, SODIUM LACTATE AND CALCIUM CHLORIDE: 600; 310; 30; 20 INJECTION, SOLUTION INTRAVENOUS at 14:15

## 2023-01-04 RX ADMIN — PROPOFOL INJECTABLE EMULSION 50 MG: 10 INJECTION, EMULSION INTRAVENOUS at 14:42

## 2023-01-04 RX ADMIN — PROPOFOL INJECTABLE EMULSION 50 MG: 10 INJECTION, EMULSION INTRAVENOUS at 14:49

## 2023-01-04 RX ADMIN — PROPOFOL INJECTABLE EMULSION 50 MG: 10 INJECTION, EMULSION INTRAVENOUS at 14:33

## 2023-01-04 RX ADMIN — PROPOFOL INJECTABLE EMULSION 50 MG: 10 INJECTION, EMULSION INTRAVENOUS at 14:37

## 2023-01-04 RX ADMIN — PROPOFOL INJECTABLE EMULSION 50 MG: 10 INJECTION, EMULSION INTRAVENOUS at 14:21

## 2023-01-04 RX ADMIN — PROPOFOL INJECTABLE EMULSION 50 MG: 10 INJECTION, EMULSION INTRAVENOUS at 14:29

## 2023-01-04 RX ADMIN — LIDOCAINE HYDROCHLORIDE 100 MG: 20 INJECTION, SOLUTION INTRAVENOUS at 14:21

## 2023-01-04 RX ADMIN — PROPOFOL INJECTABLE EMULSION 50 MG: 10 INJECTION, EMULSION INTRAVENOUS at 14:26

## 2023-01-04 RX ADMIN — PROPOFOL INJECTABLE EMULSION 50 MG: 10 INJECTION, EMULSION INTRAVENOUS at 14:23

## 2023-01-04 NOTE — BRIEF OP NOTE
COLONOSCOPY WITH POLYPECTOMY  Progress Note    Norma Ro  1/4/2023    Pre-op Diagnosis:   Personal history of colonic polyps [Z86.010]       Post-Op Diagnosis Codes:     * Personal history of colonic polyps [Z86.010]     * Colon polyp [K63.5]    Procedure/CPT® Codes:        Procedure(s):  COLONOSCOPY WITH POLYPECTOMY        Surgeon(s):  Chevy Zuniga MD    Anesthesia: Monitored Anesthesia Care    Staff:   Circulator: Caity Bolanos RN  Scrub Person: Marisol José         Estimated Blood Loss: none    Urine Voided: * No values recorded between 1/4/2023  2:16 PM and 1/4/2023  2:56 PM *    Specimens:                Specimens     ID Source Type Tests Collected By Collected At Frozen?    A Large Intestine, Right / Ascending Colon Polyp · TISSUE PATHOLOGY EXAM   Caity Bolanos, RN 1/4/23 1492     This specimen was not marked as sent.                Drains: * No LDAs found *    Findings: Difficult Colon(Looping) to Cecum   Diminutive Polyp-Biopsy        Complications: None          Chevy Zuniga MD     Date: 1/4/2023  Time: 14:56 EST

## 2023-01-04 NOTE — ANESTHESIA POSTPROCEDURE EVALUATION
Patient: Norma Ro    Procedure Summary     Date: 01/04/23 Room / Location: [facility] ENDOSCOPY 1 /  [facility]    Anesthesia Start: 1415 Anesthesia Stop: 1456    Procedure: COLONOSCOPY WITH POLYPECTOMY Diagnosis:       Personal history of colonic polyps      Colon polyp      (Personal history of colonic polyps [Z86.010])    Surgeons: Chevy Zuniga MD Provider: Dori Holland CRNA    Anesthesia Type: MAC ASA Status: 3          Anesthesia Type: MAC    Vitals  Vitals Value Taken Time   /85 01/04/23 1500   Temp     Pulse 73 01/04/23 1500   Resp 22 01/04/23 1500   SpO2 97 % 01/04/23 1500           Post Anesthesia Care and Evaluation    Patient location during evaluation: PHASE II  Patient participation: complete - patient participated  Level of consciousness: awake  Pain management: adequate    Airway patency: patent  Anesthetic complications: No anesthetic complications  PONV Status: none  Cardiovascular status: acceptable  Respiratory status: acceptable  Hydration status: acceptable

## 2023-01-04 NOTE — ANESTHESIA PREPROCEDURE EVALUATION
Anesthesia Evaluation     Patient summary reviewed and Nursing notes reviewed   no history of anesthetic complications:  NPO Solid Status: > 8 hours  NPO Liquid Status: > 2 hours           Airway   Mallampati: II  TM distance: >3 FB  Neck ROM: full  No difficulty expected  Dental    (+) edentulous    Pulmonary - negative pulmonary ROS    breath sounds clear to auscultation  Cardiovascular   Exercise tolerance: good (4-7 METS)    ECG reviewed  Rhythm: regular  Rate: normal    (+) hypertension well controlled less than 2 medications,     ROS comment: Narrative      Gianna Nayak APRN     1/5/2022 11:51 AM     ECG 12 Lead     Date/Time: 1/5/2022 11:46 AM   Performed by: Gianna Nayak APRN   Authorized by: Gianna Nayak APRN   Comparison: compared with previous ECG from 12/29/2020   Similar to previous ECG   Rhythm: sinus rhythm   Rate: normal   Conduction: conduction normal   ST Segments: ST segments normal   T Waves: T waves normal   QRS axis: normal     Clinical impression: normal ECG        Last Resulted: 01/05/22 11:46 EST          Neuro/Psych  (+) headaches, dizziness/light headedness, numbness (asim hands ), psychiatric history Depression,    GI/Hepatic/Renal/Endo    (+) morbid obesity, GERD,  renal disease CRI, thyroid problem hypothyroidism    Musculoskeletal     (+) back pain, chronic pain, gait problem, neck pain, neck stiffness,   Abdominal    Substance History      OB/GYN          Other   arthritis,    history of cancer (kidney left partial nephrecomy) active                    Anesthesia Plan    ASA 3     MAC     intravenous induction     Anesthetic plan, risks, benefits, and alternatives have been provided, discussed and informed consent has been obtained with: patient.    Use of blood products discussed with patient  Consented to blood products.       CODE STATUS:

## 2023-01-04 NOTE — H&P
Patient Care Team:  Chey Ledezma MD as PCP - General (Family Medicine)  Atnione Ha MD as Consulting Physician (Vascular Surgery)  Mir Martinez MD as Consulting Physician (Cardiology)  Marcelino Levine MD as Consulting Physician (Nephrology)  Mazin Ortiz MD as Consulting Physician (Endocrinology)  Gianna Knowles MD as Surgeon (General Surgery)  Refugio Cannon MD (Orthopedic Surgery)    CHIEF COMPLAINT: Personal hx colon polyps    HISTORY OF PRESENT ILLNESS:  Last exam was 2017    Past Medical History:   Diagnosis Date   • Agatston CAC score, <100     2017: 47   • Anemia    • Arthritis    • Broken bones    • Chronic kidney disease    • Depression    • Depression    • False positive stress test     2017 -- coronary artery CTA showed no significant CAD   • GERD (gastroesophageal reflux disease)    • Headache     occ   • Hearing loss     asim hearing aids   • History of blood clots     pt states no   • History of cardiovascular stress test     normal Cardiolite 9/2016   • Hypertension    • Hyperthyroidism    • Hypoglycemia     Rebound hypoglycemia   • Hypothyroid    • Hypothyroidism    • Incisional hernia     s/p surgical repair, 2014   • Incontinence in female    • Lower back pain    • Morbid obesity (HCC)     s/p remote gastric bypass   • Osteoarthritis    • Sinus bradycardia    • UTI (urinary tract infection)    • Varicose veins      Past Surgical History:   Procedure Laterality Date   • ANKLE ARTHRODESIS Right 01/23/2019    Right open ankle arthrodesis, harvest of proximal tibial bone graft as major graft, exostectomy, medial great toe-Dr. Santy Romero   • BACK SURGERY     • BLADDER SUSPENSION     • CARPAL TUNNEL RELEASE Left    • CHOLECYSTECTOMY     • COLONOSCOPY N/A 11/3/2017    Procedure: COLONOSCOPY; polypectomy;  Surgeon: Chevy Zuniga MD;  Location: [facility];  Service:    • COLONOSCOPY N/A 03/25/2007    Normal-Dr. Timoteo Prado   • EXTERNAL FIXATOR  APPLICATION     • EYE SURGERY  08/26/2020    Dr. Avalos   • GASTRIC BYPASS      1980s   • HERNIA REPAIR  2014   • LUMBAR FUSION Bilateral 02/04/2021   • NEPHRECTOMY PARTIAL Left 3/9/2022    Procedure: NEPHRECTOMY PARTIAL LAPAROSCOPIC;  Surgeon: Yves Hawley MD;  Location: [facility] OR;  Service: Urology;  Laterality: Left;   • ORIF TIBIA/FIBULA FRACTURES Right 2005   • PARATHYROIDECTOMY N/A 11/20/2019    Procedure: left inferior parathyroid mediastinal resection and right inferior parathyroid resection, thyroid mass resection;  Surgeon: Gianna Knowles MD;  Location: [facility] MAIN OR;  Service: General   • TOTAL HIP ARTHROPLASTY Right 07/2021   • UPPER GASTROINTESTINAL ENDOSCOPY N/A 03/25/2007    No gross lesions in the esophagus, non-bleeding erythematous gastropathy, antrectomy with a small gastric pouch remnant, Billroth II anatomy with a gastrojejunostomy-Dr. Timoteo Prado     Family History   Problem Relation Age of Onset   • Aortic aneurysm Father         abdominal   • Heart disease Father    • Hypertension Brother    • Stroke Brother    • Intracerebral hemorrhage Brother    • Heart disease Brother    • Heart disease Maternal Grandfather    • Stroke Paternal Grandmother    • Heart disease Paternal Grandfather    • Diabetes Mother    • Heart disease Mother    • Malig Hyperthermia Neg Hx      Social History     Tobacco Use   • Smoking status: Never   • Smokeless tobacco: Never   • Tobacco comments:     secondary smoke   Vaping Use   • Vaping Use: Never used   Substance Use Topics   • Alcohol use: No     Comment: very seldom   • Drug use: No     Medications Prior to Admission   Medication Sig Dispense Refill Last Dose   • Acetaminophen (TYLENOL ARTHRITIS PAIN PO) Take 650 mg by mouth 2 (Two) Times a Day.   Past Month   • cyclobenzaprine (FLEXERIL) 5 MG tablet Take 5 mg by mouth As Needed.   Past Month   • DULoxetine (CYMBALTA) 30 MG capsule Take 30 mg by mouth 2 (Two) Times a Day.   1/3/2023   • famotidine  (PEPCID) 20 MG tablet Take 20 mg by mouth Daily.   1/4/2023   • gabapentin (NEURONTIN) 600 MG tablet Take 300 mg by mouth 3 (Three) Times a Day.   1/3/2023   • HYDROcodone-acetaminophen (NORCO) 5-325 MG per tablet Take 1 tablet by mouth Every 6 (Six) Hours As Needed.   Past Month   • lisinopril (PRINIVIL,ZESTRIL) 5 MG tablet Take 5 mg by mouth Daily.   1/3/2023   • Loratadine 10 MG capsule Take 10 mg by mouth As Needed.   1/3/2023   • oxyCODONE-acetaminophen (Percocet) 7.5-325 MG per tablet Take 1 tablet by mouth Every 4 (Four) Hours As Needed for Moderate Pain . 20 tablet 0 Past Month   • traMADol (ULTRAM) 50 MG tablet Take 50 mg by mouth Every 6 (Six) Hours As Needed.   1/4/2023 at 0600   • B Complex Vitamins (VITAMIN B COMPLEX) capsule capsule Take 1 capsule by mouth Every Night.   12/28/2022   • calcitriol (ROCALTROL) 0.5 MCG capsule Take 0.5 mcg by mouth Daily.      • Cholecalciferol (VITAMIN D3) 75 MCG (3000 UT) tablet Take 3,000 Units by mouth Daily.   12/28/2022   • estradiol (ESTRACE) 0.1 MG/GM vaginal cream Insert 1 g into the vagina.      • ferrous sulfate 325 (65 FE) MG tablet Take 325 mg by mouth Every Other Day.   12/28/2022   • polyethylene glycol (MIRALAX) 17 GM/SCOOP powder Take 17 g by mouth As Needed.      • Probiotic Product (PROBIOTIC PO) Take 1 capsule by mouth As Needed.      • traZODone (DESYREL) 50 MG tablet Take 50 mg by mouth As Needed for Sleep.   More than a month   • vitamin B-12 (CYANOCOBALAMIN) 1000 MCG tablet Take 1,000 mcg by mouth Daily.   12/28/2022     Allergies:  Baclofen, Morphine, Nsaids, Flagyl [metronidazole], Lodine [etodolac], Macrodantin [nitrofurantoin], Other, and Tetracyclines & related    REVIEW OF SYSTEMS:  Please see the above history of present illness for pertinent positives and negatives.  The remainder of the patient's systems have been reviewed and are negative.     Vital Signs  Temp:  [98.3 °F (36.8 °C)] 98.3 °F (36.8 °C)  Heart Rate:  [81] 81  Resp:  [15]  15  BP: (158)/() 158/99    Flowsheet Rows    Flowsheet Row First Filed Value   Admission Height --   Admission Weight 108 kg (237 lb 6.4 oz) Documented at 01/04/2023 1219           Physical Exam:  Physical Exam   Constitutional: Patient appears well-developed and well-nourished and in no acute distress   HEENT:   Head: Normocephalic and atraumatic.   Eyes:  Pupils are equal, round, and reactive to light. EOM are intact. Sclerae are anicteric and non-injected.  Mouth and Throat: Patient has moist mucous membranes. Oropharynx is clear of any erythema or exudate.     Neck: Neck supple. No JVD present. No thyromegaly present. No lymphadenopathy present.  Cardiovascular: Regular rate, regular rhythm, S1 normal and S2 normal.  Exam reveals no gallop and no friction rub.  No murmur heard.  Pulmonary/Chest: Lungs are clear to auscultation bilaterally. No respiratory distress. No wheezes. No rhonchi. No rales.   Abdominal: Soft. Bowel sounds are normal. No distension and no mass. There is no hepatosplenomegaly. There is no tenderness.   Musculoskeletal: Normal Muscle tone  Extremities: No edema. Pulses are palpable in all 4 extremities.  Neurological: Patient is alert and oriented to person, place, and time. Cranial nerves II-XII are grossly intact with no focal deficits.  Skin: Skin is warm. No rash noted. Nails show no clubbing.  No cyanosis or erythema.    Debilities/Disabilities Identified: None  Emotional Behavior: Appropriate     Results Review:   I reviewed the patient's new clinical results.    Lab Results (most recent)     None          Imaging Results (Most Recent)     None        reviewed    ECG/EMG Results (most recent)     None        reviewed    Assessment & Plan   Personal hx colon polyps/  colonoscopy      I discussed the patient's findings and my recommendations with patient.     Chevy Zuniga MD  01/04/23  14:15 EST    Time: 10 min prior to procedure.

## 2023-01-06 LAB
LAB AP CASE REPORT: NORMAL
PATH REPORT.FINAL DX SPEC: NORMAL
PATH REPORT.GROSS SPEC: NORMAL

## 2023-01-16 NOTE — PROGRESS NOTES
Date of Office Visit: 2023  Encounter Provider: Mir Martinez MD  Place of Service: [facility] CARDIOLOGY  Patient Name: Norma Ro  :1950    Chief Complaint   Patient presents with   • Hypertension   :     HPI: Norma Ro is a 72 y.o. female who presents today for follow up. I have reviewed prior notes and there are no changes except for any new updates described below. I have also reviewed any information entered into the medical record by the patient or by ancillary staff.     She initially saw Dr. Adhikari in 2014 for preoperative risk assessment prior to hernia repair; he recommended no testing as she was aysmptomatic. Her pulse was 66 bpm at that time.     I met her in 2016 for the evaluation of sinus bradycardia.  She has hypothyroidism, and in 2016 she was hyperthyroid on her levothyroxine. The dose was decreased and her TSH jumped to 21 in mid 2016. Around that time, she also became concerned about her heart rate; a Holter monitor showed an average pulse of 58 bpm, with a range of 38-94. There were extremely rare ectopics, and no pauses.      She was started on lisinopril for hypertension in 2016.  She has CKD and follows with Dr. Levine for this.     In 2017, she reported dyspnea and atypical chest pain.  A Cardiolite indicated anterior ischemia.  She wanted to avoid coronary angiography so coronary CTA was recommended.  Unfortunately, she was given oral metoprolol prior to the study per radiology protocol and became quite bradycardic and required overnight observation. Her CTA showed no occlusive CAD; her CACS was 47.    She has chronic mild edema but has large varicose veins (and is morbidly obese). She was seen in the vein care center in 2016 and she declined a venous doppler (to check for reflux).  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  Compression hose were recommended but she couldn't wear them due  to leg size.    Over the last few months, she has been experiencing worsening orthostatic symptoms, especially after lying down at night. Dr Ledezma started midodrine, which has helped somewhat. Otherwise, she's not had any chest pain. She has chronic exertional dyspnea. She had a partial nephrectomy earlier this year and there were no cardiac issues perioperatively.     Past Medical History:   Diagnosis Date   • Agatston CAC score, <100     2017: 47   • Anemia    • Arthritis    • Broken bones    • Chronic kidney disease    • Depression    • Depression    • False positive stress test     2017 -- coronary artery CTA showed no significant CAD   • GERD (gastroesophageal reflux disease)    • Headache     occ   • Hearing loss     asim hearing aids   • History of blood clots     pt states no   • History of cardiovascular stress test     normal Cardiolite 9/2016   • Hypertension    • Hyperthyroidism    • Hypoglycemia     Rebound hypoglycemia   • Hypothyroid    • Hypothyroidism    • Incisional hernia     s/p surgical repair, 2014   • Incontinence in female    • Lower back pain    • Morbid obesity (HCC)     s/p remote gastric bypass   • Osteoarthritis    • Sinus bradycardia    • UTI (urinary tract infection)    • Varicose veins        Past Surgical History:   Procedure Laterality Date   • ANKLE ARTHRODESIS Right 01/23/2019    Right open ankle arthrodesis, harvest of proximal tibial bone graft as major graft, exostectomy, medial great toe-Dr. Santy Romero   • BACK SURGERY     • BLADDER SUSPENSION     • CARPAL TUNNEL RELEASE Left    • CHOLECYSTECTOMY     • COLONOSCOPY N/A 11/3/2017    Procedure: COLONOSCOPY; polypectomy;  Surgeon: Chevy Zuniga MD;  Location: [facility] OR;  Service:    • COLONOSCOPY N/A 03/25/2007    Normal-Dr. Timoteo Prado   • COLONOSCOPY W/ POLYPECTOMY N/A 1/4/2023    Procedure: COLONOSCOPY WITH POLYPECTOMY;  Surgeon: Chevy Zuniga MD;  Location: [facility] OR;  Service:  Gastroenterology;  Laterality: N/A;  ascending polyp x1   • EXTERNAL FIXATOR APPLICATION     • EYE SURGERY  08/26/2020    Dr. Avalos   • GASTRIC BYPASS      1980s   • HERNIA REPAIR  2014   • LUMBAR FUSION Bilateral 02/04/2021   • NEPHRECTOMY PARTIAL Left 3/9/2022    Procedure: NEPHRECTOMY PARTIAL LAPAROSCOPIC;  Surgeon: Yves Hawley MD;  Location:  [facility] OR;  Service: Urology;  Laterality: Left;   • ORIF TIBIA/FIBULA FRACTURES Right 2005   • PARATHYROIDECTOMY N/A 11/20/2019    Procedure: left inferior parathyroid mediastinal resection and right inferior parathyroid resection, thyroid mass resection;  Surgeon: Gianna Knowles MD;  Location: [facility] MAIN OR;  Service: General   • TOTAL HIP ARTHROPLASTY Right 07/2021   • UPPER GASTROINTESTINAL ENDOSCOPY N/A 03/25/2007    No gross lesions in the esophagus, non-bleeding erythematous gastropathy, antrectomy with a small gastric pouch remnant, Billroth II anatomy with a gastrojejunostomy-Dr. Timoteo Prado       Social History     Socioeconomic History   • Marital status:    Tobacco Use   • Smoking status: Never   • Smokeless tobacco: Never   • Tobacco comments:     secondary smoke   Vaping Use   • Vaping Use: Never used   Substance and Sexual Activity   • Alcohol use: No     Comment: very seldom   • Drug use: No   • Sexual activity: Not Currently     Partners: Male     Birth control/protection: Post-menopausal, None     Comment:  is impotent       Family History   Problem Relation Age of Onset   • Aortic aneurysm Father         abdominal   • Heart disease Father    • Hypertension Brother    • Stroke Brother    • Intracerebral hemorrhage Brother    • Heart disease Brother    • Heart disease Maternal Grandfather    • Stroke Paternal Grandmother    • Heart disease Paternal Grandfather    • Diabetes Mother    • Heart disease Mother    • Malig Hyperthermia Neg Hx        Review of Systems   Constitutional: Positive for malaise/fatigue.   Cardiovascular:  Positive for leg swelling. Negative for chest pain.   Respiratory: Negative for shortness of breath.    Musculoskeletal: Positive for joint pain and myalgias. Negative for back pain.   Neurological: Positive for excessive daytime sleepiness and light-headedness.   Psychiatric/Behavioral: Positive for depression.   All other systems reviewed and are negative.      Allergies   Allergen Reactions   • Baclofen Dizziness and Arrhythmia   • Morphine Itching and Other (See Comments)     Chest tightness, Abdominal Pain   • Nsaids Other (See Comments)     DUE TO CKD STAGE III   • Flagyl [Metronidazole] Other (See Comments)     headaches   • Lodine [Etodolac] Hives     Blotches under the skin   • Macrodantin [Nitrofurantoin] Other (See Comments)     headaches   • Other Rash     LODINE  BLOTCHES ON SKIN        • Tetracyclines & Related Hives and Rash         Current Outpatient Medications:   •  Acetaminophen (TYLENOL ARTHRITIS PAIN PO), Take 650 mg by mouth 2 (Two) Times a Day., Disp: , Rfl:   •  B Complex Vitamins (VITAMIN B COMPLEX) capsule capsule, Take 1 capsule by mouth Every Night., Disp: , Rfl:   •  calcitriol (ROCALTROL) 0.5 MCG capsule, Take 0.5 mcg by mouth Daily., Disp: , Rfl:   •  Cholecalciferol (VITAMIN D3) 75 MCG (3000 UT) tablet, Take 3,000 Units by mouth Daily., Disp: , Rfl:   •  cyclobenzaprine (FLEXERIL) 5 MG tablet, Take 5 mg by mouth As Needed., Disp: , Rfl:   •  DULoxetine (CYMBALTA) 30 MG capsule, Take 30 mg by mouth 2 (Two) Times a Day., Disp: , Rfl:   •  estradiol (ESTRACE) 0.1 MG/GM vaginal cream, Insert 1 g into the vagina., Disp: , Rfl:   •  famotidine (PEPCID) 20 MG tablet, Take 20 mg by mouth Daily., Disp: , Rfl:   •  ferrous sulfate 325 (65 FE) MG tablet, Take 325 mg by mouth Every Other Day., Disp: , Rfl:   •  gabapentin (NEURONTIN) 600 MG tablet, Take 300 mg by mouth 2 (Two) Times a Day., Disp: , Rfl:   •  HYDROcodone-acetaminophen (NORCO) 5-325 MG per tablet, Take 1 tablet by mouth Every 6  "(Six) Hours As Needed., Disp: , Rfl:   •  levothyroxine (SYNTHROID, LEVOTHROID) 112 MCG tablet, , Disp: , Rfl:   •  lisinopril (PRINIVIL,ZESTRIL) 5 MG tablet, Take 5 mg by mouth Daily., Disp: , Rfl:   •  Loratadine 10 MG capsule, Take 10 mg by mouth As Needed., Disp: , Rfl:   •  midodrine (PROAMATINE) 2.5 MG tablet, , Disp: , Rfl:   •  oxyCODONE-acetaminophen (Percocet) 7.5-325 MG per tablet, Take 1 tablet by mouth Every 4 (Four) Hours As Needed for Moderate Pain ., Disp: 20 tablet, Rfl: 0  •  polyethylene glycol (MIRALAX) 17 GM/SCOOP powder, Take 17 g by mouth As Needed., Disp: , Rfl:   •  Probiotic Product (PROBIOTIC PO), Take 1 capsule by mouth As Needed., Disp: , Rfl:   •  traMADol (ULTRAM) 50 MG tablet, Take 50 mg by mouth Every 6 (Six) Hours As Needed., Disp: , Rfl:   •  traZODone (DESYREL) 50 MG tablet, Take 50 mg by mouth As Needed for Sleep., Disp: , Rfl:   •  vitamin B-12 (CYANOCOBALAMIN) 1000 MCG tablet, Take 1,000 mcg by mouth Daily., Disp: , Rfl:      Objective:     Vitals:    01/17/23 1040   BP: 136/70   Pulse: 77   Resp: 16   SpO2: 96%   Weight: 107 kg (235 lb)   Height: 157.5 cm (62\")     Body mass index is 42.98 kg/m².    Physical Exam  Vitals reviewed.   Constitutional:       Comments: Obese   HENT:      Head: Normocephalic.      Nose: Nose normal.   Eyes:      Conjunctiva/sclera: Conjunctivae normal.   Neck:      Comments: Cannot assess for JVD due to habitus  Cardiovascular:      Rate and Rhythm: Normal rate and regular rhythm.      Pulses: Normal pulses and intact distal pulses.      Heart sounds: Normal heart sounds. No murmur heard.  Pulmonary:      Effort: Pulmonary effort is normal.      Breath sounds: Normal breath sounds.   Abdominal:      Palpations: Abdomen is soft.      Tenderness: There is no abdominal tenderness.      Comments: Obesity limits abdominal exam   Musculoskeletal:         General: Swelling present. Normal range of motion.      Cervical back: Normal range of motion. "   Skin:     General: Skin is warm and dry.      Findings: No rash.   Neurological:      General: No focal deficit present.      Mental Status: She is alert.      Cranial Nerves: No cranial nerve deficit.   Psychiatric:         Mood and Affect: Mood normal.         Behavior: Behavior normal.         Thought Content: Thought content normal.         ECG 12 Lead    Date/Time: 1/17/2023 10:52 AM  Performed by: Mir Martinez MD  Authorized by: Mir Martinez MD   Comparison: compared with previous ECG   Similar to previous ECG  Rhythm: sinus rhythm  Conduction: conduction normal  ST Segments: ST segments normal  T Waves: T waves normal  QRS axis: normal  Other: no other findings    Clinical impression: normal ECG            Assessment:       Diagnosis Plan   1. False positive stress test        2. Agatston CAC score, <100        3. Primary hypertension        4. Orthostatic dizziness        5. Sinus bradycardia               Plan:     1/2. She has a history of a false positive stress test without any significant coronary disease by a CT angiogram in 2017. Ideally she would be on a statin to decrease her risk but she declines.    3/4. I suspect her orthostatic symptoms are predominantly due to venous pooling in her legs. I again asked her to consider wearing her compression garments. I explained that since her primary care physician and nephrologist were both following her BP and have made med adjustments during the last few months, that I would defer BP management to them.     5. She is a history of relatively mild sinus bradycardia which is asymptomatic.  She does not require a pacemaker.  She has good heart rate variability by previous monitoring studies.      Sincerely,       Mir Martinez MD

## 2023-01-17 ENCOUNTER — OFFICE VISIT (OUTPATIENT)
Dept: CARDIOLOGY | Facility: CLINIC | Age: 73
End: 2023-01-17
Payer: MEDICARE

## 2023-01-17 VITALS
RESPIRATION RATE: 16 BRPM | BODY MASS INDEX: 43.24 KG/M2 | DIASTOLIC BLOOD PRESSURE: 70 MMHG | SYSTOLIC BLOOD PRESSURE: 136 MMHG | OXYGEN SATURATION: 96 % | WEIGHT: 235 LBS | HEART RATE: 77 BPM | HEIGHT: 62 IN

## 2023-01-17 DIAGNOSIS — R93.1 AGATSTON CAC SCORE, <100: ICD-10-CM

## 2023-01-17 DIAGNOSIS — R00.1 SINUS BRADYCARDIA: ICD-10-CM

## 2023-01-17 DIAGNOSIS — R42 ORTHOSTATIC DIZZINESS: ICD-10-CM

## 2023-01-17 DIAGNOSIS — I10 PRIMARY HYPERTENSION: ICD-10-CM

## 2023-01-17 DIAGNOSIS — Z78.9 FALSE POSITIVE STRESS TEST: Primary | ICD-10-CM

## 2023-01-17 PROBLEM — M25.571 ACUTE RIGHT ANKLE PAIN: Status: RESOLVED | Noted: 2018-12-28 | Resolved: 2023-01-17

## 2023-01-17 PROCEDURE — 93000 ELECTROCARDIOGRAM COMPLETE: CPT | Performed by: INTERNAL MEDICINE

## 2023-01-17 PROCEDURE — 99214 OFFICE O/P EST MOD 30 MIN: CPT | Performed by: INTERNAL MEDICINE

## 2023-01-17 RX ORDER — LEVOTHYROXINE SODIUM 112 UG/1
TABLET ORAL
COMMUNITY
Start: 2023-01-10

## 2023-01-17 RX ORDER — MIDODRINE HYDROCHLORIDE 2.5 MG/1
TABLET ORAL
COMMUNITY
Start: 2021-10-17

## 2023-03-20 ENCOUNTER — TRANSCRIBE ORDERS (OUTPATIENT)
Dept: ADMINISTRATIVE | Facility: HOSPITAL | Age: 73
End: 2023-03-20
Payer: MEDICARE

## 2023-03-20 ENCOUNTER — LAB (OUTPATIENT)
Dept: LAB | Facility: HOSPITAL | Age: 73
End: 2023-03-20
Payer: MEDICARE

## 2023-03-20 ENCOUNTER — HOSPITAL ENCOUNTER (OUTPATIENT)
Dept: CT IMAGING | Facility: HOSPITAL | Age: 73
Discharge: HOME | End: 2023-03-20
Payer: MEDICARE

## 2023-03-20 DIAGNOSIS — C64.2 MALIGNANT NEOPLASM OF LEFT KIDNEY, EXCEPT RENAL PELVIS: ICD-10-CM

## 2023-03-20 DIAGNOSIS — C64.2 MALIGNANT NEOPLASM OF LEFT KIDNEY, EXCEPT RENAL PELVIS: Primary | ICD-10-CM

## 2023-03-20 DIAGNOSIS — N18.2 CHRONIC KIDNEY DISEASE, STAGE II (MILD): ICD-10-CM

## 2023-03-20 DIAGNOSIS — N18.2 CHRONIC KIDNEY DISEASE, STAGE II (MILD): Primary | ICD-10-CM

## 2023-03-20 LAB
ALBUMIN SERPL-MCNC: 3.8 G/DL (ref 3.5–5.2)
ALBUMIN/GLOB SERPL: 1.6 G/DL
ALP SERPL-CCNC: 89 U/L (ref 39–117)
ALT SERPL W P-5'-P-CCNC: 8 U/L (ref 1–33)
ANION GAP SERPL CALCULATED.3IONS-SCNC: 13.5 MMOL/L (ref 5–15)
AST SERPL-CCNC: 14 U/L (ref 1–32)
BACTERIA UR QL AUTO: NORMAL /HPF
BASOPHILS # BLD AUTO: 0.04 10*3/MM3 (ref 0–0.2)
BASOPHILS NFR BLD AUTO: 0.5 % (ref 0–1.5)
BILIRUB SERPL-MCNC: 0.2 MG/DL (ref 0–1.2)
BILIRUB UR QL STRIP: NEGATIVE
BUN SERPL-MCNC: 20 MG/DL (ref 8–23)
BUN/CREAT SERPL: 15.7 (ref 7–25)
CALCIUM SPEC-SCNC: 8.5 MG/DL (ref 8.6–10.5)
CHLORIDE SERPL-SCNC: 104 MMOL/L (ref 98–107)
CLARITY UR: CLEAR
CO2 SERPL-SCNC: 22.5 MMOL/L (ref 22–29)
COLOR UR: YELLOW
CREAT SERPL-MCNC: 1.27 MG/DL (ref 0.57–1)
DEPRECATED RDW RBC AUTO: 41.5 FL (ref 37–54)
EGFRCR SERPLBLD CKD-EPI 2021: 44.5 ML/MIN/1.73
EOSINOPHIL # BLD AUTO: 0.28 10*3/MM3 (ref 0–0.4)
EOSINOPHIL NFR BLD AUTO: 3.8 % (ref 0.3–6.2)
ERYTHROCYTE [DISTWIDTH] IN BLOOD BY AUTOMATED COUNT: 13 % (ref 12.3–15.4)
GLOBULIN UR ELPH-MCNC: 2.4 GM/DL
GLUCOSE SERPL-MCNC: 80 MG/DL (ref 65–99)
GLUCOSE UR STRIP-MCNC: NEGATIVE MG/DL
HCT VFR BLD AUTO: 37.5 % (ref 34–46.6)
HGB BLD-MCNC: 12.4 G/DL (ref 12–15.9)
HGB UR QL STRIP.AUTO: NEGATIVE
HYALINE CASTS UR QL AUTO: NORMAL /LPF
IMM GRANULOCYTES # BLD AUTO: 0.03 10*3/MM3 (ref 0–0.05)
IMM GRANULOCYTES NFR BLD AUTO: 0.4 % (ref 0–0.5)
KETONES UR QL STRIP: ABNORMAL
LEUKOCYTE ESTERASE UR QL STRIP.AUTO: ABNORMAL
LYMPHOCYTES # BLD AUTO: 2.69 10*3/MM3 (ref 0.7–3.1)
LYMPHOCYTES NFR BLD AUTO: 36.6 % (ref 19.6–45.3)
MCH RBC QN AUTO: 29 PG (ref 26.6–33)
MCHC RBC AUTO-ENTMCNC: 33.1 G/DL (ref 31.5–35.7)
MCV RBC AUTO: 87.8 FL (ref 79–97)
MONOCYTES # BLD AUTO: 0.59 10*3/MM3 (ref 0.1–0.9)
MONOCYTES NFR BLD AUTO: 8 % (ref 5–12)
NEUTROPHILS NFR BLD AUTO: 3.72 10*3/MM3 (ref 1.7–7)
NEUTROPHILS NFR BLD AUTO: 50.7 % (ref 42.7–76)
NITRITE UR QL STRIP: NEGATIVE
NRBC BLD AUTO-RTO: 0 /100 WBC (ref 0–0.2)
PH UR STRIP.AUTO: 6 [PH] (ref 5–8)
PHOSPHATE SERPL-MCNC: 5.9 MG/DL (ref 2.5–4.5)
PLATELET # BLD AUTO: 324 10*3/MM3 (ref 140–450)
PMV BLD AUTO: 10.4 FL (ref 6–12)
POTASSIUM SERPL-SCNC: 5.3 MMOL/L (ref 3.5–5.2)
PROT SERPL-MCNC: 6.2 G/DL (ref 6–8.5)
PROT UR QL STRIP: NEGATIVE
RBC # BLD AUTO: 4.27 10*6/MM3 (ref 3.77–5.28)
RBC # UR STRIP: NORMAL /HPF
REF LAB TEST METHOD: NORMAL
SODIUM SERPL-SCNC: 140 MMOL/L (ref 136–145)
SP GR UR STRIP: 1.02 (ref 1–1.03)
SQUAMOUS #/AREA URNS HPF: NORMAL /HPF
UROBILINOGEN UR QL STRIP: ABNORMAL
WBC # UR STRIP: NORMAL /HPF
WBC NRBC COR # BLD: 7.35 10*3/MM3 (ref 3.4–10.8)

## 2023-03-20 PROCEDURE — 74176 CT ABD & PELVIS W/O CONTRAST: CPT

## 2023-03-20 PROCEDURE — 85025 COMPLETE CBC W/AUTO DIFF WBC: CPT

## 2023-03-20 PROCEDURE — 84100 ASSAY OF PHOSPHORUS: CPT

## 2023-03-20 PROCEDURE — 80053 COMPREHEN METABOLIC PANEL: CPT

## 2023-03-20 PROCEDURE — 36415 COLL VENOUS BLD VENIPUNCTURE: CPT

## 2023-03-20 PROCEDURE — 71250 CT THORAX DX C-: CPT

## 2023-03-20 PROCEDURE — 81001 URINALYSIS AUTO W/SCOPE: CPT

## 2023-03-30 ENCOUNTER — TRANSCRIBE ORDERS (OUTPATIENT)
Dept: ADMINISTRATIVE | Facility: HOSPITAL | Age: 73
End: 2023-03-30
Payer: MEDICARE

## 2023-03-30 DIAGNOSIS — C64.2 MALIGNANT NEOPLASM OF LEFT KIDNEY, EXCEPT RENAL PELVIS: Primary | ICD-10-CM

## 2023-04-25 ENCOUNTER — TRANSCRIBE ORDERS (OUTPATIENT)
Dept: ADMINISTRATIVE | Facility: HOSPITAL | Age: 73
End: 2023-04-25
Payer: MEDICARE

## 2023-04-25 ENCOUNTER — LAB (OUTPATIENT)
Dept: LAB | Facility: HOSPITAL | Age: 73
End: 2023-04-25
Payer: MEDICARE

## 2023-04-25 DIAGNOSIS — E83.52 HYPERCALCEMIA: Primary | ICD-10-CM

## 2023-04-25 DIAGNOSIS — E83.52 HYPERCALCEMIA: ICD-10-CM

## 2023-04-25 DIAGNOSIS — E21.3 HYPERPARATHYROIDISM, UNSPECIFIED: ICD-10-CM

## 2023-04-25 DIAGNOSIS — E55.9 AVITAMINOSIS D: ICD-10-CM

## 2023-04-25 LAB
25(OH)D3 SERPL-MCNC: 39.7 NG/ML (ref 30–100)
CALCIUM SPEC-SCNC: 8.6 MG/DL (ref 8.6–10.5)
PTH-INTACT SERPL-MCNC: 136 PG/ML (ref 15–65)

## 2023-04-25 PROCEDURE — 82306 VITAMIN D 25 HYDROXY: CPT

## 2023-04-25 PROCEDURE — 83970 ASSAY OF PARATHORMONE: CPT

## 2023-04-25 PROCEDURE — 36415 COLL VENOUS BLD VENIPUNCTURE: CPT

## 2023-04-25 PROCEDURE — 82310 ASSAY OF CALCIUM: CPT

## 2023-05-02 ENCOUNTER — TRANSCRIBE ORDERS (OUTPATIENT)
Dept: BONE DENSITY | Facility: HOSPITAL | Age: 73
End: 2023-05-02
Payer: MEDICARE

## 2023-05-02 DIAGNOSIS — Z78.0 POSTMENOPAUSAL: Primary | ICD-10-CM

## 2023-05-19 ENCOUNTER — HOSPITAL ENCOUNTER (EMERGENCY)
Facility: HOSPITAL | Age: 73
Discharge: HOME | End: 2023-05-19
Attending: EMERGENCY MEDICINE
Payer: MEDICARE

## 2023-05-19 VITALS
DIASTOLIC BLOOD PRESSURE: 83 MMHG | BODY MASS INDEX: 42.51 KG/M2 | OXYGEN SATURATION: 95 % | TEMPERATURE: 97 F | WEIGHT: 231 LBS | SYSTOLIC BLOOD PRESSURE: 103 MMHG | HEIGHT: 62 IN | HEART RATE: 73 BPM | RESPIRATION RATE: 18 BRPM

## 2023-05-19 DIAGNOSIS — B34.9 VIRAL ILLNESS: ICD-10-CM

## 2023-05-19 DIAGNOSIS — R68.83 CHILLS: Primary | ICD-10-CM

## 2023-05-19 LAB
ALBUMIN SERPL-MCNC: 3.9 G/DL (ref 3.5–5.2)
ALBUMIN/GLOB SERPL: 1.2 G/DL
ALP SERPL-CCNC: 90 U/L (ref 39–117)
ALT SERPL W P-5'-P-CCNC: 12 U/L (ref 1–33)
ANION GAP SERPL CALCULATED.3IONS-SCNC: 10.5 MMOL/L (ref 5–15)
AST SERPL-CCNC: 21 U/L (ref 1–32)
BASOPHILS # BLD AUTO: 0.03 10*3/MM3 (ref 0–0.2)
BASOPHILS NFR BLD AUTO: 0.4 % (ref 0–1.5)
BILIRUB SERPL-MCNC: 0.5 MG/DL (ref 0–1.2)
BUN SERPL-MCNC: 20 MG/DL (ref 8–23)
BUN/CREAT SERPL: 15 (ref 7–25)
CALCIUM SPEC-SCNC: 9.3 MG/DL (ref 8.6–10.5)
CHLORIDE SERPL-SCNC: 104 MMOL/L (ref 98–107)
CO2 SERPL-SCNC: 23.5 MMOL/L (ref 22–29)
CREAT SERPL-MCNC: 1.33 MG/DL (ref 0.57–1)
DEPRECATED RDW RBC AUTO: 49.1 FL (ref 37–54)
EGFRCR SERPLBLD CKD-EPI 2021: 41.8 ML/MIN/1.73
EOSINOPHIL # BLD AUTO: 0.18 10*3/MM3 (ref 0–0.4)
EOSINOPHIL NFR BLD AUTO: 2.3 % (ref 0.3–6.2)
ERYTHROCYTE [DISTWIDTH] IN BLOOD BY AUTOMATED COUNT: 14.4 % (ref 12.3–15.4)
GLOBULIN UR ELPH-MCNC: 3.2 GM/DL
GLUCOSE SERPL-MCNC: 119 MG/DL (ref 65–99)
HCT VFR BLD AUTO: 45.5 % (ref 34–46.6)
HGB BLD-MCNC: 14.2 G/DL (ref 12–15.9)
IMM GRANULOCYTES # BLD AUTO: 0.03 10*3/MM3 (ref 0–0.05)
IMM GRANULOCYTES NFR BLD AUTO: 0.4 % (ref 0–0.5)
LIPASE SERPL-CCNC: 29 U/L (ref 13–60)
LYMPHOCYTES # BLD AUTO: 2.19 10*3/MM3 (ref 0.7–3.1)
LYMPHOCYTES NFR BLD AUTO: 28.2 % (ref 19.6–45.3)
MCH RBC QN AUTO: 28.6 PG (ref 26.6–33)
MCHC RBC AUTO-ENTMCNC: 31.2 G/DL (ref 31.5–35.7)
MCV RBC AUTO: 91.5 FL (ref 79–97)
MONOCYTES # BLD AUTO: 0.55 10*3/MM3 (ref 0.1–0.9)
MONOCYTES NFR BLD AUTO: 7.1 % (ref 5–12)
NEUTROPHILS NFR BLD AUTO: 4.78 10*3/MM3 (ref 1.7–7)
NEUTROPHILS NFR BLD AUTO: 61.6 % (ref 42.7–76)
PLATELET # BLD AUTO: 354 10*3/MM3 (ref 140–450)
PMV BLD AUTO: 9.9 FL (ref 6–12)
POTASSIUM SERPL-SCNC: 4.6 MMOL/L (ref 3.5–5.2)
PROT SERPL-MCNC: 7.1 G/DL (ref 6–8.5)
RBC # BLD AUTO: 4.97 10*6/MM3 (ref 3.77–5.28)
SODIUM SERPL-SCNC: 138 MMOL/L (ref 136–145)
WBC NRBC COR # BLD: 7.76 10*3/MM3 (ref 3.4–10.8)

## 2023-05-19 PROCEDURE — 85025 COMPLETE CBC W/AUTO DIFF WBC: CPT

## 2023-05-19 PROCEDURE — 80053 COMPREHEN METABOLIC PANEL: CPT

## 2023-05-19 PROCEDURE — 99283 EMERGENCY DEPT VISIT LOW MDM: CPT

## 2023-05-19 PROCEDURE — 83690 ASSAY OF LIPASE: CPT

## 2023-05-19 PROCEDURE — 96374 THER/PROPH/DIAG INJ IV PUSH: CPT

## 2023-05-19 PROCEDURE — 25010000002 ONDANSETRON PER 1 MG

## 2023-05-19 RX ORDER — ONDANSETRON 2 MG/ML
8 INJECTION INTRAMUSCULAR; INTRAVENOUS ONCE
Status: COMPLETED | OUTPATIENT
Start: 2023-05-19 | End: 2023-05-19

## 2023-05-19 RX ORDER — ONDANSETRON 4 MG/1
8 TABLET, ORALLY DISINTEGRATING ORAL EVERY 8 HOURS PRN
Qty: 30 TABLET | Refills: 0 | Status: SHIPPED | OUTPATIENT
Start: 2023-05-19 | End: 2023-05-24

## 2023-05-19 RX ORDER — ACETAMINOPHEN 500 MG
1000 TABLET ORAL ONCE
Status: COMPLETED | OUTPATIENT
Start: 2023-05-19 | End: 2023-05-19

## 2023-05-19 RX ADMIN — ACETAMINOPHEN 1000 MG: 500 TABLET ORAL at 15:15

## 2023-05-19 RX ADMIN — ONDANSETRON 8 MG: 2 INJECTION INTRAMUSCULAR; INTRAVENOUS at 14:39

## 2023-05-19 RX ADMIN — SODIUM CHLORIDE 1000 ML: 9 INJECTION, SOLUTION INTRAVENOUS at 13:29

## 2023-05-19 NOTE — ED NOTES
Pt ambulated to room without assistance. Pt states she can't take NSAIDS but did not recall other allergies, multiple allergies listed in EPIC

## 2023-05-19 NOTE — ED PROVIDER NOTES
EMERGENCY DEPARTMENT ENCOUNTER      Room Number: 09/09    History is provided by the patient, no translation services needed    HPI:    Chief complaint: chills, weakness    Narrative: Pt is a 73 y.o. female with a history of chronic pain, hypertension, hypothyroidism, CKD, GERD who presents complaining of weakness, poor appetite, chills, hot flashes, nausea x1 week.  Reports associated lightheadedness.  Has been checking temperature at home and has not had fever.  Had a couple of episodes of diarrhea a few days ago.  Able to drink approximately 4 bottles of water per day.  Denies vomiting, constipation, abdominal pain, chest pain, shortness of breath, congestion, cough.  Patient recently was seen by urgent care a few days ago, diagnosed with UTI and prescribed cephalexin.  She continues to take as directed.      PMD: Chey Ledezma MD    REVIEW OF SYSTEMS  Review of Systems   Constitutional: Positive for appetite change and chills. Negative for fever.   HENT: Negative for congestion and sore throat.    Eyes: Negative for visual disturbance.   Respiratory: Negative for cough and shortness of breath.    Cardiovascular: Negative for chest pain and leg swelling.   Gastrointestinal: Positive for diarrhea and nausea. Negative for abdominal pain, constipation and vomiting.   Genitourinary: Negative for dysuria and flank pain.   Musculoskeletal: Negative for arthralgias, gait problem and myalgias.   Skin: Negative for rash and wound.   Neurological: Positive for weakness and light-headedness. Negative for syncope and headaches.   Psychiatric/Behavioral: Negative for confusion. The patient is not nervous/anxious.          PAST MEDICAL HISTORY  Active Ambulatory Problems     Diagnosis Date Noted   • Hypertension    • Sinus bradycardia    • Morbid obesity with BMI of 40.0-44.9, adult 04/11/2017   • Lipoedema 04/11/2017   • Hypothyroidism 11/17/2017   • Chronic right-sided low back pain with right-sided sciatica 12/12/2018    • False positive stress test 11/07/2019   • Agatston CAC score, <100 11/07/2019   • Incisional hernia, without obstruction or gangrene 08/28/2020   • Arthritis of left ankle 10/25/2019   • Osteoporosis 07/13/2021   • Vitamin D deficiency 01/06/2021   • Nuclear cataract 07/28/2020   • Left renal mass 03/09/2022   • Chronic kidney disease, stage 3 unspecified 01/01/2021   • Gastro-esophageal reflux disease without esophagitis 01/01/2021   • Hypertensive chronic kidney disease w stg 1-4/unsp chr kdny 01/01/2021   • Long term (current) use of opiate analgesic 01/01/2021   • Major depressive disorder with single episode, in remission 01/01/2021   • Presence of right artificial hip joint 07/27/2021   • Unilateral primary osteoarthritis, right hip 07/27/2021   • Personal history of colonic polyps 08/04/2022     Resolved Ambulatory Problems     Diagnosis Date Noted   • Chest pain 09/09/2016   • Hyperparathyroidism 05/21/2019   • Hypercalcemia 05/21/2019   • Acute right ankle pain 12/28/2018     Past Medical History:   Diagnosis Date   • Anemia    • Arthritis    • Broken bones    • Chronic kidney disease    • Depression    • Depression    • GERD (gastroesophageal reflux disease)    • Headache    • Hearing loss    • History of blood clots    • History of cardiovascular stress test    • Hyperthyroidism    • Hypoglycemia    • Hypothyroid    • Incisional hernia    • Incontinence in female    • Lower back pain    • Morbid obesity    • Osteoarthritis    • UTI (urinary tract infection)    • Varicose veins        PAST SURGICAL HISTORY  Past Surgical History:   Procedure Laterality Date   • ANKLE ARTHRODESIS Right 01/23/2019    Right open ankle arthrodesis, harvest of proximal tibial bone graft as major graft, exostectomy, medial great toe-Dr. Santy Romero   • BACK SURGERY     • BLADDER SUSPENSION     • CARPAL TUNNEL RELEASE Left    • CHOLECYSTECTOMY     • COLONOSCOPY N/A 11/3/2017    Procedure: COLONOSCOPY; polypectomy;   Surgeon: Chevy Zuniga MD;  Location:  [facility];  Service:    • COLONOSCOPY N/A 03/25/2007    Normal-Dr. Timoteo Prado   • COLONOSCOPY W/ POLYPECTOMY N/A 1/4/2023    Procedure: COLONOSCOPY WITH POLYPECTOMY;  Surgeon: Chevy Zuniga MD;  Location:  [facility];  Service: Gastroenterology;  Laterality: N/A;  ascending polyp x1   • EXTERNAL FIXATOR APPLICATION     • EYE SURGERY  08/26/2020    Dr. Avalos   • GASTRIC BYPASS      1980s   • HERNIA REPAIR  2014   • LUMBAR FUSION Bilateral 02/04/2021   • NEPHRECTOMY PARTIAL Left 3/9/2022    Procedure: NEPHRECTOMY PARTIAL LAPAROSCOPIC;  Surgeon: Yves Hawley MD;  Location:  [facility];  Service: Urology;  Laterality: Left;   • ORIF TIBIA/FIBULA FRACTURES Right 2005   • PARATHYROIDECTOMY N/A 11/20/2019    Procedure: left inferior parathyroid mediastinal resection and right inferior parathyroid resection, thyroid mass resection;  Surgeon: Gianna Knowles MD;  Location:  [facility];  Service: General   • TOTAL HIP ARTHROPLASTY Right 07/2021   • UPPER GASTROINTESTINAL ENDOSCOPY N/A 03/25/2007    No gross lesions in the esophagus, non-bleeding erythematous gastropathy, antrectomy with a small gastric pouch remnant, Billroth II anatomy with a gastrojejunostomy-Dr. Timoteo Prado       FAMILY HISTORY  Family History   Problem Relation Age of Onset   • Aortic aneurysm Father         abdominal   • Heart disease Father    • Hypertension Brother    • Stroke Brother    • Intracerebral hemorrhage Brother    • Heart disease Brother    • Heart disease Maternal Grandfather    • Stroke Paternal Grandmother    • Heart disease Paternal Grandfather    • Diabetes Mother    • Heart disease Mother    • Malig Hyperthermia Neg Hx        SOCIAL HISTORY  Social History     Socioeconomic History   • Marital status:    Tobacco Use   • Smoking status: Never   • Smokeless tobacco: Never   • Tobacco comments:     secondary smoke   Vaping Use   • Vaping Use: Never used    Substance and Sexual Activity   • Alcohol use: No     Comment: very seldom   • Drug use: No   • Sexual activity: Not Currently     Partners: Male     Birth control/protection: Post-menopausal, None     Comment:  is impotent       ALLERGIES  Baclofen, Morphine, Nsaids, Flagyl [metronidazole], Lodine [etodolac], Macrodantin [nitrofurantoin], Other, and Tetracyclines & related    No current facility-administered medications for this encounter.    Current Outpatient Medications:   •  Acetaminophen (TYLENOL ARTHRITIS PAIN PO), Take 650 mg by mouth 2 (Two) Times a Day., Disp: , Rfl:   •  B Complex Vitamins (VITAMIN B COMPLEX) capsule capsule, Take 1 capsule by mouth Every Night., Disp: , Rfl:   •  calcitriol (ROCALTROL) 0.5 MCG capsule, Take 0.5 mcg by mouth Daily., Disp: , Rfl:   •  Cholecalciferol (VITAMIN D3) 75 MCG (3000 UT) tablet, Take 3,000 Units by mouth Daily., Disp: , Rfl:   •  cyclobenzaprine (FLEXERIL) 5 MG tablet, Take 5 mg by mouth As Needed., Disp: , Rfl:   •  DULoxetine (CYMBALTA) 30 MG capsule, Take 30 mg by mouth 2 (Two) Times a Day., Disp: , Rfl:   •  estradiol (ESTRACE) 0.1 MG/GM vaginal cream, Insert 1 g into the vagina., Disp: , Rfl:   •  famotidine (PEPCID) 20 MG tablet, Take 20 mg by mouth Daily., Disp: , Rfl:   •  ferrous sulfate 325 (65 FE) MG tablet, Take 325 mg by mouth Every Other Day., Disp: , Rfl:   •  gabapentin (NEURONTIN) 600 MG tablet, Take 300 mg by mouth 2 (Two) Times a Day., Disp: , Rfl:   •  HYDROcodone-acetaminophen (NORCO) 5-325 MG per tablet, Take 1 tablet by mouth Every 6 (Six) Hours As Needed., Disp: , Rfl:   •  levothyroxine (SYNTHROID, LEVOTHROID) 112 MCG tablet, , Disp: , Rfl:   •  lisinopril (PRINIVIL,ZESTRIL) 5 MG tablet, Take 5 mg by mouth Daily., Disp: , Rfl:   •  Loratadine 10 MG capsule, Take 10 mg by mouth As Needed., Disp: , Rfl:   •  midodrine (PROAMATINE) 2.5 MG tablet, , Disp: , Rfl:   •  ondansetron ODT (ZOFRAN-ODT) 4 MG disintegrating tablet, Place 2  tablets on the tongue Every 8 (Eight) Hours As Needed for Nausea or Vomiting for up to 5 days., Disp: 30 tablet, Rfl: 0  •  polyethylene glycol (MIRALAX) 17 GM/SCOOP powder, Take 17 g by mouth As Needed., Disp: , Rfl:   •  Probiotic Product (PROBIOTIC PO), Take 1 capsule by mouth As Needed., Disp: , Rfl:   •  traMADol (ULTRAM) 50 MG tablet, Take 50 mg by mouth Every 6 (Six) Hours As Needed., Disp: , Rfl:   •  traZODone (DESYREL) 50 MG tablet, Take 50 mg by mouth As Needed for Sleep., Disp: , Rfl:   •  vitamin B-12 (CYANOCOBALAMIN) 1000 MCG tablet, Take 1,000 mcg by mouth Daily., Disp: , Rfl:     PHYSICAL EXAM  ED Triage Vitals   Temp Pulse Resp BP SpO2   -- -- -- -- --      Temp src Heart Rate Source Patient Position BP Location FiO2 (%)   -- -- -- -- --       Physical Exam  Constitutional:       General: She is not in acute distress.     Appearance: She is obese. She is not ill-appearing, toxic-appearing or diaphoretic.   HENT:      Head: Normocephalic and atraumatic.   Eyes:      Extraocular Movements: Extraocular movements intact.      Conjunctiva/sclera: Conjunctivae normal.      Pupils: Pupils are equal, round, and reactive to light.   Cardiovascular:      Rate and Rhythm: Normal rate and regular rhythm.   Pulmonary:      Effort: Pulmonary effort is normal.      Breath sounds: Normal breath sounds.   Abdominal:      General: There is no distension.      Palpations: Abdomen is soft.      Tenderness: There is abdominal tenderness (Epigastric). There is no rebound.   Musculoskeletal:         General: Normal range of motion.      Comments: Right foot surgical scarring and deformity.   Skin:     General: Skin is warm and dry.      Capillary Refill: Capillary refill takes less than 2 seconds.      Coloration: Skin is not jaundiced.      Findings: No bruising, lesion or rash.   Neurological:      General: No focal deficit present.      Mental Status: She is oriented to person, place, and time.   Psychiatric:          Mood and Affect: Mood normal.         Behavior: Behavior normal.           LAB RESULTS  Lab Results (last 24 hours)     Procedure Component Value Units Date/Time    CBC & Differential [[id number]]  (Abnormal) Collected: 05/19/23 1329    Specimen: Blood Updated: 05/19/23 1352    Narrative:      The following orders were created for panel order CBC & Differential.  Procedure                               Abnormality         Status                     ---------                               -----------         ------                     CBC Auto Differential[[id number]]        Abnormal            Final result                 Please view results for these tests on the individual orders.    Comprehensive Metabolic Panel [[id number]]  (Abnormal) Collected: 05/19/23 1329    Specimen: Blood Updated: 05/19/23 1412     Glucose 119 mg/dL      BUN 20 mg/dL      Creatinine 1.33 mg/dL      Sodium 138 mmol/L      Potassium 4.6 mmol/L      Comment: Slight hemolysis detected by analyzer. Results may be affected.        Chloride 104 mmol/L      CO2 23.5 mmol/L      Calcium 9.3 mg/dL      Total Protein 7.1 g/dL      Albumin 3.9 g/dL      ALT (SGPT) 12 U/L      AST (SGOT) 21 U/L      Comment: Slight hemolysis detected by analyzer. Results may be affected.        Alkaline Phosphatase 90 U/L      Total Bilirubin 0.5 mg/dL      Globulin 3.2 gm/dL      A/G Ratio 1.2 g/dL      BUN/Creatinine Ratio 15.0     Anion Gap 10.5 mmol/L      eGFR 41.8 mL/min/1.73     Narrative:      GFR Normal >60  Chronic Kidney Disease <60  Kidney Failure <15    The GFR formula is only valid for adults with stable renal function between ages 18 and 70.    Lipase [[id number]]  (Normal) Collected: 05/19/23 1329    Specimen: Blood Updated: 05/19/23 1409     Lipase 29 U/L     CBC Auto Differential [[id number]]  (Abnormal) Collected: 05/19/23 1329    Specimen: Blood Updated: 05/19/23 1352     WBC 7.76 10*3/mm3      RBC 4.97 10*6/mm3      Hemoglobin 14.2 g/dL      Hematocrit  45.5 %      MCV 91.5 fL      MCH 28.6 pg      MCHC 31.2 g/dL      RDW 14.4 %      RDW-SD 49.1 fl      MPV 9.9 fL      Platelets 354 10*3/mm3      Neutrophil % 61.6 %      Lymphocyte % 28.2 %      Monocyte % 7.1 %      Eosinophil % 2.3 %      Basophil % 0.4 %      Immature Grans % 0.4 %      Neutrophils, Absolute 4.78 10*3/mm3      Lymphocytes, Absolute 2.19 10*3/mm3      Monocytes, Absolute 0.55 10*3/mm3      Eosinophils, Absolute 0.18 10*3/mm3      Basophils, Absolute 0.03 10*3/mm3      Immature Grans, Absolute 0.03 10*3/mm3             I ordered the above labs and reviewed the results    RADIOLOGY  No Radiology Exams Resulted Within Past 24 Hours      PROCEDURES  Procedures      PROGRESS AND CONSULTS  ED Course as of 05/19/23 7968   Fri May 19, 2023   3711 Patient is a 73-year-old female who presents to the emergency department with chills and generalized weakness for about 1 week.  She has been previously evaluated by urgent care and prescribed antibiotic cephalexin for UTI.  She continues on this antibiotic.  On exam, patient is well-appearing in no acute distress.  Vital signs are WNL.  There is moderate epigastric tenderness, but abdomen is otherwise soft and nonsurgical.  Lab work is largely normal.  No evidence of bacterial infection.  Kidney function at baseline per chart review.  Patient presentation and work-up most consistent with viral GI illness.  Patient had several episodes of diarrhea and has had nausea and poor appetite.  Fluids, Tylenol, and Zofran given while in ED.  Will prescribe Zofran for home.  Discussed when to return to emergency department.  Answered all questions.  Patient verbalized understanding and was agreeable to discharge.  Patient had minimal urine output while in ED.  However, already being treated for UTI with cephalexin.  Encouraged to continue her antibiotics until completion. [AS]      ED Course User Index  [AS] Yen Hawley PA-C           MEDICAL DECISION  MAKING    MDM  Number of Diagnoses or Management Options  Chills  Diagnosis management comments: My differential diagnosis includes but is not limited to: Gastritis, viral illness, bacterial illness, appendicitis, diverticulitis, UTI, cystitis, pyelonephritis, ureterolithiasis, URI       Amount and/or Complexity of Data Reviewed  Clinical lab tests: reviewed  Decide to obtain previous medical records or to obtain history from someone other than the patient: yes           DIAGNOSIS  Final diagnoses:   Chills   Viral illness       Latest Documented Vital Signs:  As of 22:48 EDT  BP- 103/83 HR- 73 Temp- 97 °F (36.1 °C) (Oral) O2 sat- 95%    DISPOSITION  Discharged home.    Discussed pertinent findings with the patient/family.  Patient/Family voiced understanding of need to follow-up for recheck and further testing as needed.  Return to the Emergency Department warnings were given.         Medication List      New Prescriptions    ondansetron ODT 4 MG disintegrating tablet  Commonly known as: ZOFRAN-ODT  Place 2 tablets on the tongue Every 8 (Eight) Hours As Needed for Nausea or Vomiting for up to 5 days.           Where to Get Your Medications      These medications were sent to [place], [place] - [address] - [phone]  - [phone]   [address] [facility] [address]    Phone: [phone]   · ondansetron ODT 4 MG disintegrating tablet              Follow-up Information     Schedule an appointment as soon as possible for a visit  with Chey Ledezma MD.    Specialties: Family Medicine, Emergency Medicine  Contact information:  [address] [place] [id number] [phone]                           Dictated utilizing Dragon dictation     Yen Hawley PA-C  05/19/23 1931       Yen Hawley PA-C  05/19/23 7481

## 2023-05-19 NOTE — DISCHARGE INSTRUCTIONS
Recommend follow-up with PCP for ongoing symptoms.  Return to emergency department for worsening symptoms or other medical emergencies.  Recommend taking Tylenol 1000 mg 3 times daily for chills and fever.  Use Zofran as needed for nausea.  Increase food and fluid intake as tolerated.  Continue antibiotic as prescribed by outside provider.

## 2023-05-26 ENCOUNTER — APPOINTMENT (OUTPATIENT)
Dept: BONE DENSITY | Facility: HOSPITAL | Age: 73
End: 2023-05-26
Payer: MEDICARE

## 2023-05-26 DIAGNOSIS — Z78.0 POSTMENOPAUSAL: ICD-10-CM

## 2023-05-26 PROCEDURE — 77080 DXA BONE DENSITY AXIAL: CPT

## 2023-09-26 ENCOUNTER — TRANSCRIBE ORDERS (OUTPATIENT)
Dept: ADMINISTRATIVE | Facility: HOSPITAL | Age: 73
End: 2023-09-26
Payer: MEDICARE

## 2023-09-26 ENCOUNTER — LAB (OUTPATIENT)
Dept: LAB | Facility: HOSPITAL | Age: 73
End: 2023-09-26
Payer: MEDICARE

## 2023-09-26 ENCOUNTER — HOSPITAL ENCOUNTER (OUTPATIENT)
Dept: CT IMAGING | Facility: HOSPITAL | Age: 73
Discharge: HOME | End: 2023-09-26
Payer: MEDICARE

## 2023-09-26 DIAGNOSIS — E55.9 AVITAMINOSIS D: ICD-10-CM

## 2023-09-26 DIAGNOSIS — C64.2 MALIGNANT NEOPLASM OF LEFT KIDNEY, EXCEPT RENAL PELVIS: ICD-10-CM

## 2023-09-26 DIAGNOSIS — D64.9 ANEMIA, UNSPECIFIED TYPE: ICD-10-CM

## 2023-09-26 DIAGNOSIS — E83.52 HYPERCALCEMIA: ICD-10-CM

## 2023-09-26 DIAGNOSIS — N18.2 CHRONIC KIDNEY DISEASE, STAGE II (MILD): ICD-10-CM

## 2023-09-26 DIAGNOSIS — E21.3 HYPERPARATHYROIDISM, UNSPECIFIED: ICD-10-CM

## 2023-09-26 DIAGNOSIS — N18.2 CHRONIC KIDNEY DISEASE, STAGE II (MILD): Primary | ICD-10-CM

## 2023-09-26 DIAGNOSIS — C64.2 MALIGNANT NEOPLASM OF LEFT KIDNEY, EXCEPT RENAL PELVIS: Primary | ICD-10-CM

## 2023-09-26 LAB
25(OH)D3 SERPL-MCNC: 39.6 NG/ML (ref 30–100)
ALBUMIN SERPL-MCNC: 4.3 G/DL (ref 3.5–5.2)
ALBUMIN/GLOB SERPL: 1.6 G/DL
ALP SERPL-CCNC: 101 U/L (ref 39–117)
ALT SERPL W P-5'-P-CCNC: 11 U/L (ref 1–33)
ANION GAP SERPL CALCULATED.3IONS-SCNC: 10.9 MMOL/L (ref 5–15)
AST SERPL-CCNC: 20 U/L (ref 1–32)
BACTERIA UR QL AUTO: ABNORMAL /HPF
BASOPHILS # BLD AUTO: 0.06 10*3/MM3 (ref 0–0.2)
BASOPHILS NFR BLD AUTO: 0.7 % (ref 0–1.5)
BILIRUB SERPL-MCNC: 0.3 MG/DL (ref 0–1.2)
BILIRUB UR QL STRIP: NEGATIVE
BUN SERPL-MCNC: 22 MG/DL (ref 8–23)
BUN/CREAT SERPL: 16.1 (ref 7–25)
CALCIUM SPEC-SCNC: 8.8 MG/DL (ref 8.6–10.5)
CHLORIDE SERPL-SCNC: 105 MMOL/L (ref 98–107)
CLARITY UR: ABNORMAL
CO2 SERPL-SCNC: 24.1 MMOL/L (ref 22–29)
COLOR UR: ABNORMAL
CREAT SERPL-MCNC: 1.37 MG/DL (ref 0.57–1)
DEPRECATED RDW RBC AUTO: 44 FL (ref 37–54)
EGFRCR SERPLBLD CKD-EPI 2021: 40.3 ML/MIN/1.73
EOSINOPHIL # BLD AUTO: 0.22 10*3/MM3 (ref 0–0.4)
EOSINOPHIL NFR BLD AUTO: 2.6 % (ref 0.3–6.2)
ERYTHROCYTE [DISTWIDTH] IN BLOOD BY AUTOMATED COUNT: 13.4 % (ref 12.3–15.4)
GLOBULIN UR ELPH-MCNC: 2.7 GM/DL
GLUCOSE SERPL-MCNC: 102 MG/DL (ref 65–99)
GLUCOSE UR STRIP-MCNC: NEGATIVE MG/DL
HCT VFR BLD AUTO: 43.6 % (ref 34–46.6)
HGB BLD-MCNC: 14.1 G/DL (ref 12–15.9)
HGB UR QL STRIP.AUTO: NEGATIVE
HYALINE CASTS UR QL AUTO: ABNORMAL /LPF
IMM GRANULOCYTES # BLD AUTO: 0.04 10*3/MM3 (ref 0–0.05)
IMM GRANULOCYTES NFR BLD AUTO: 0.5 % (ref 0–0.5)
KETONES UR QL STRIP: ABNORMAL
LEUKOCYTE ESTERASE UR QL STRIP.AUTO: ABNORMAL
LYMPHOCYTES # BLD AUTO: 3.2 10*3/MM3 (ref 0.7–3.1)
LYMPHOCYTES NFR BLD AUTO: 37.7 % (ref 19.6–45.3)
MCH RBC QN AUTO: 28.9 PG (ref 26.6–33)
MCHC RBC AUTO-ENTMCNC: 32.3 G/DL (ref 31.5–35.7)
MCV RBC AUTO: 89.3 FL (ref 79–97)
MONOCYTES # BLD AUTO: 0.73 10*3/MM3 (ref 0.1–0.9)
MONOCYTES NFR BLD AUTO: 8.6 % (ref 5–12)
NEUTROPHILS NFR BLD AUTO: 4.23 10*3/MM3 (ref 1.7–7)
NEUTROPHILS NFR BLD AUTO: 49.9 % (ref 42.7–76)
NITRITE UR QL STRIP: NEGATIVE
NRBC BLD AUTO-RTO: 0 /100 WBC (ref 0–0.2)
PH UR STRIP.AUTO: 6 [PH] (ref 5–8)
PHOSPHATE SERPL-MCNC: 4.5 MG/DL (ref 2.5–4.5)
PLATELET # BLD AUTO: 427 10*3/MM3 (ref 140–450)
PMV BLD AUTO: 10.6 FL (ref 6–12)
POTASSIUM SERPL-SCNC: 4.6 MMOL/L (ref 3.5–5.2)
PROT SERPL-MCNC: 7 G/DL (ref 6–8.5)
PROT UR QL STRIP: ABNORMAL
PTH-INTACT SERPL-MCNC: 230 PG/ML (ref 15–65)
RBC # BLD AUTO: 4.88 10*6/MM3 (ref 3.77–5.28)
RBC # UR STRIP: ABNORMAL /HPF
REF LAB TEST METHOD: ABNORMAL
SODIUM SERPL-SCNC: 140 MMOL/L (ref 136–145)
SP GR UR STRIP: 1.02 (ref 1–1.03)
SQUAMOUS #/AREA URNS HPF: ABNORMAL /HPF
UROBILINOGEN UR QL STRIP: ABNORMAL
WBC # UR STRIP: ABNORMAL /HPF
WBC NRBC COR # BLD: 8.48 10*3/MM3 (ref 3.4–10.8)

## 2023-09-26 PROCEDURE — 81001 URINALYSIS AUTO W/SCOPE: CPT

## 2023-09-26 PROCEDURE — 83970 ASSAY OF PARATHORMONE: CPT

## 2023-09-26 PROCEDURE — 36415 COLL VENOUS BLD VENIPUNCTURE: CPT

## 2023-09-26 PROCEDURE — 74176 CT ABD & PELVIS W/O CONTRAST: CPT

## 2023-09-26 PROCEDURE — 84100 ASSAY OF PHOSPHORUS: CPT

## 2023-09-26 PROCEDURE — 85025 COMPLETE CBC W/AUTO DIFF WBC: CPT

## 2023-09-26 PROCEDURE — 80053 COMPREHEN METABOLIC PANEL: CPT

## 2023-09-26 PROCEDURE — 82306 VITAMIN D 25 HYDROXY: CPT

## 2023-10-04 ENCOUNTER — TRANSCRIBE ORDERS (OUTPATIENT)
Dept: ADMINISTRATIVE | Facility: HOSPITAL | Age: 73
End: 2023-10-04
Payer: MEDICARE

## 2023-10-04 DIAGNOSIS — C64.2: Primary | ICD-10-CM

## 2023-10-11 ENCOUNTER — HOSPITAL ENCOUNTER (EMERGENCY)
Facility: HOSPITAL | Age: 73
Discharge: HOME | End: 2023-10-11
Attending: EMERGENCY MEDICINE
Payer: MEDICARE

## 2023-10-11 VITALS
TEMPERATURE: 98.7 F | RESPIRATION RATE: 18 BRPM | OXYGEN SATURATION: 96 % | HEIGHT: 62 IN | HEART RATE: 66 BPM | WEIGHT: 260 LBS | BODY MASS INDEX: 47.84 KG/M2 | SYSTOLIC BLOOD PRESSURE: 165 MMHG | DIASTOLIC BLOOD PRESSURE: 99 MMHG

## 2023-10-11 DIAGNOSIS — I10 HYPERTENSION, UNSPECIFIED TYPE: Primary | ICD-10-CM

## 2023-10-11 LAB
HOLD SPECIMEN: NORMAL
HOLD SPECIMEN: NORMAL
WHOLE BLOOD HOLD COAG: NORMAL
WHOLE BLOOD HOLD SPECIMEN: NORMAL

## 2023-10-11 PROCEDURE — 99283 EMERGENCY DEPT VISIT LOW MDM: CPT

## 2023-10-11 RX ORDER — LISINOPRIL 10 MG/1
10 TABLET ORAL ONCE
Status: COMPLETED | OUTPATIENT
Start: 2023-10-11 | End: 2023-10-11

## 2023-10-11 RX ORDER — OLANZAPINE 2.5 MG/1
2.5 TABLET ORAL NIGHTLY
COMMUNITY

## 2023-10-11 RX ADMIN — LISINOPRIL 10 MG: 10 TABLET ORAL at 18:28

## 2023-10-11 NOTE — ED PROVIDER NOTES
EMERGENCY DEPARTMENT ENCOUNTER      Room Number: 08/08    History is provided by the patient, no translation services needed    HPI:    Chief complaint: Hypertension    Location: Generalized    Quality/Severity: The patient denies any pain at this time.    Timing/Duration: Noticed yesterday    Modifying Factors: None    Associated Symptoms: None    Narrative: Pt is a 73 y.o. female who presents complaining of hypertension that she noticed yesterday.  The patient advises that she often gets checked on by a company that works for her nephrologist.  She states that the company contacted her yesterday and asked her what her blood pressure was.  She took her blood pressure at home and noted that it was elevated.  The company encouraged the patient to call her primary care physician to follow-up.  Patient states that she contacted the PCP twice and still has not heard back from her PCP today.  She states that the company also attempted to contact her PCP and they have not heard back either.  The company contacted the patient again today to check on her and her blood pressure.  Her blood pressure was still elevated so they encouraged the patient to go to the ED to be evaluated further.  She denies any headaches, dizziness, vision changes, weakness, neck pain, or back pain.  She does not have any chest pain, shortness of breath, cough, abdominal pain, nausea, vomiting, diarrhea, or urinary symptoms.      PMD: Chey Ledezma MD    REVIEW OF SYSTEMS  Review of Systems   Constitutional:  Negative for chills and fever.   Eyes:  Negative for photophobia and visual disturbance.   Respiratory:  Negative for cough and shortness of breath.    Cardiovascular:  Negative for chest pain.   Gastrointestinal:  Negative for abdominal pain, diarrhea, nausea and vomiting.   Genitourinary:  Negative for dysuria.   Musculoskeletal:  Negative for back pain, gait problem, neck pain and neck stiffness.   Skin:  Negative for color change,  pallor, rash and wound.   Neurological:  Negative for dizziness, syncope, weakness, numbness and headaches.   Psychiatric/Behavioral:  Negative for confusion. The patient is not nervous/anxious.          PAST MEDICAL HISTORY  Active Ambulatory Problems     Diagnosis Date Noted    Hypertension     Sinus bradycardia     Morbid obesity with BMI of 40.0-44.9, adult 04/11/2017    Lipoedema 04/11/2017    Hypothyroidism 11/17/2017    Chronic right-sided low back pain with right-sided sciatica 12/12/2018    False positive stress test 11/07/2019    Agatston CAC score, <100 11/07/2019    Incisional hernia, without obstruction or gangrene 08/28/2020    Arthritis of left ankle 10/25/2019    Osteoporosis 07/13/2021    Vitamin D deficiency 01/06/2021    Nuclear cataract 07/28/2020    Left renal mass 03/09/2022    Chronic kidney disease, stage 3 unspecified 01/01/2021    Gastro-esophageal reflux disease without esophagitis 01/01/2021    Hypertensive chronic kidney disease w stg 1-4/unsp chr kdny 01/01/2021    Long term (current) use of opiate analgesic 01/01/2021    Major depressive disorder with single episode, in remission 01/01/2021    Presence of right artificial hip joint 07/27/2021    Unilateral primary osteoarthritis, right hip 07/27/2021    Personal history of colonic polyps 08/04/2022     Resolved Ambulatory Problems     Diagnosis Date Noted    Chest pain 09/09/2016    Hyperparathyroidism 05/21/2019    Hypercalcemia 05/21/2019    Acute right ankle pain 12/28/2018     Past Medical History:   Diagnosis Date    Anemia     Arthritis     Broken bones     Chronic kidney disease     Depression     Depression     GERD (gastroesophageal reflux disease)     Headache     Hearing loss     History of blood clots     History of cardiovascular stress test     Hyperthyroidism     Hypoglycemia     Hypothyroid     Incisional hernia     Incontinence in female     Lower back pain     Morbid obesity     Osteoarthritis     UTI (urinary tract  infection)     Varicose veins        PAST SURGICAL HISTORY  Past Surgical History:   Procedure Laterality Date    ANKLE ARTHRODESIS Right 01/23/2019    Right open ankle arthrodesis, harvest of proximal tibial bone graft as major graft, exostectomy, medial great toe-Dr. Santy Romero    BACK SURGERY      BLADDER SUSPENSION      CARPAL TUNNEL RELEASE Left     CHOLECYSTECTOMY      COLONOSCOPY N/A 11/3/2017    Procedure: COLONOSCOPY; polypectomy;  Surgeon: Chevy Zuniga MD;  Location:  [facility];  Service:     COLONOSCOPY N/A 03/25/2007    Normal-Dr. Timoteo Prado    COLONOSCOPY W/ POLYPECTOMY N/A 1/4/2023    Procedure: COLONOSCOPY WITH POLYPECTOMY;  Surgeon: Chevy Zuniga MD;  Location:  [facility];  Service: Gastroenterology;  Laterality: N/A;  ascending polyp x1    EXTERNAL FIXATOR APPLICATION      EYE SURGERY  08/26/2020    Dr. Avalos    GASTRIC BYPASS      1980s    HERNIA REPAIR  2014    LUMBAR FUSION Bilateral 02/04/2021    NEPHRECTOMY PARTIAL Left 3/9/2022    Procedure: NEPHRECTOMY PARTIAL LAPAROSCOPIC;  Surgeon: Yves Hawley MD;  Location:  [facility];  Service: Urology;  Laterality: Left;    ORIF TIBIA/FIBULA FRACTURES Right 2005    PARATHYROIDECTOMY N/A 11/20/2019    Procedure: left inferior parathyroid mediastinal resection and right inferior parathyroid resection, thyroid mass resection;  Surgeon: Gianna Knowles MD;  Location: [facility];  Service: General    TOTAL HIP ARTHROPLASTY Right 07/2021    UPPER GASTROINTESTINAL ENDOSCOPY N/A 03/25/2007    No gross lesions in the esophagus, non-bleeding erythematous gastropathy, antrectomy with a small gastric pouch remnant, Billroth II anatomy with a gastrojejunostomy-Dr. Timoteo Prado       FAMILY HISTORY  Family History   Problem Relation Age of Onset    Aortic aneurysm Father         abdominal    Heart disease Father     Hypertension Brother     Stroke Brother     Intracerebral hemorrhage Brother     Heart disease Brother      Heart disease Maternal Grandfather     Stroke Paternal Grandmother     Heart disease Paternal Grandfather     Diabetes Mother     Heart disease Mother     Malig Hyperthermia Neg Hx        SOCIAL HISTORY  Social History     Socioeconomic History    Marital status:    Tobacco Use    Smoking status: Never    Smokeless tobacco: Never    Tobacco comments:     secondary smoke   Vaping Use    Vaping Use: Never used   Substance and Sexual Activity    Alcohol use: No     Comment: very seldom    Drug use: No    Sexual activity: Not Currently     Partners: Male     Birth control/protection: Post-menopausal, None     Comment:  is impotent       ALLERGIES  Baclofen, Morphine, Nsaids, Flagyl [metronidazole], Lodine [etodolac], Macrodantin [nitrofurantoin], Other, and Tetracyclines & related    No current facility-administered medications for this encounter.    Current Outpatient Medications:     Acetaminophen (TYLENOL ARTHRITIS PAIN PO), Take 650 mg by mouth 2 (Two) Times a Day., Disp: , Rfl:     B Complex Vitamins (VITAMIN B COMPLEX) capsule capsule, Take 1 capsule by mouth Every Night., Disp: , Rfl:     calcitriol (ROCALTROL) 0.5 MCG capsule, Take 0.75 mcg by mouth Daily., Disp: , Rfl:     Cholecalciferol (VITAMIN D3) 75 MCG (3000 UT) tablet, Take 3,000 Units by mouth Daily., Disp: , Rfl:     cyclobenzaprine (FLEXERIL) 5 MG tablet, Take 5 mg by mouth As Needed., Disp: , Rfl:     DULoxetine (CYMBALTA) 30 MG capsule, Take 30 mg by mouth 2 (Two) Times a Day., Disp: , Rfl:     estradiol (ESTRACE) 0.1 MG/GM vaginal cream, Insert 1 g into the vagina., Disp: , Rfl:     famotidine (PEPCID) 20 MG tablet, Take 20 mg by mouth Daily., Disp: , Rfl:     ferrous sulfate 325 (65 FE) MG tablet, Take 325 mg by mouth Every Other Day., Disp: , Rfl:     gabapentin (NEURONTIN) 600 MG tablet, Take 300 mg by mouth 2 (Two) Times a Day., Disp: , Rfl:     HYDROcodone-acetaminophen (NORCO) 5-325 MG per tablet, Take 1 tablet by mouth Every  6 (Six) Hours As Needed., Disp: , Rfl:     levothyroxine (SYNTHROID, LEVOTHROID) 112 MCG tablet, , Disp: , Rfl:     lisinopril (PRINIVIL,ZESTRIL) 5 MG tablet, Take 5 mg by mouth Daily., Disp: , Rfl:     Loratadine 10 MG capsule, Take 10 mg by mouth As Needed., Disp: , Rfl:     midodrine (PROAMATINE) 2.5 MG tablet, , Disp: , Rfl:     OLANZapine (zyPREXA) 2.5 MG tablet, Take 1 tablet by mouth Every Night., Disp: , Rfl:     polyethylene glycol (MIRALAX) 17 GM/SCOOP powder, Take 17 g by mouth As Needed., Disp: , Rfl:     Probiotic Product (PROBIOTIC PO), Take 1 capsule by mouth As Needed., Disp: , Rfl:     traMADol (ULTRAM) 50 MG tablet, Take 50 mg by mouth Every 6 (Six) Hours As Needed., Disp: , Rfl:     traZODone (DESYREL) 50 MG tablet, Take 50 mg by mouth As Needed for Sleep., Disp: , Rfl:     vitamin B-12 (CYANOCOBALAMIN) 1000 MCG tablet, Take 1,000 mcg by mouth Daily., Disp: , Rfl:     PHYSICAL EXAM  ED Triage Vitals   Temp Heart Rate Resp BP SpO2   10/11/23 1753 10/11/23 1753 10/11/23 1753 10/11/23 1753 10/11/23 1807   98.7 øF (37.1 øC) 67 18 (!) 207/109 97 %      Temp src Heart Rate Source Patient Position BP Location FiO2 (%)   10/11/23 1753 10/11/23 1753 -- -- --   Oral Monitor          Physical Exam  Vitals and nursing note reviewed.   Constitutional:       General: She is not in acute distress.     Appearance: Normal appearance. She is not ill-appearing, toxic-appearing or diaphoretic.   HENT:      Head: Normocephalic and atraumatic.      Nose: Nose normal. No congestion or rhinorrhea.      Mouth/Throat:      Mouth: Mucous membranes are moist.      Pharynx: Oropharynx is clear.   Eyes:      General: No scleral icterus.        Right eye: No discharge.         Left eye: No discharge.      Extraocular Movements: Extraocular movements intact.      Conjunctiva/sclera: Conjunctivae normal.      Pupils: Pupils are equal, round, and reactive to light.   Cardiovascular:      Rate and Rhythm: Normal rate and regular  rhythm.      Heart sounds: Normal heart sounds.      No friction rub.   Pulmonary:      Effort: Pulmonary effort is normal. No respiratory distress.      Breath sounds: Normal breath sounds. No stridor. No wheezing, rhonchi or rales.   Chest:      Chest wall: No tenderness.   Abdominal:      General: Bowel sounds are normal. There is no distension.      Palpations: Abdomen is soft. There is no mass.      Tenderness: There is no abdominal tenderness. There is no guarding or rebound.   Musculoskeletal:         General: No swelling, tenderness, deformity or signs of injury. Normal range of motion.      Cervical back: Normal range of motion and neck supple. No rigidity.      Right lower leg: No edema.      Left lower leg: No edema.   Skin:     General: Skin is warm and dry.      Coloration: Skin is not jaundiced or pale.      Findings: No bruising, erythema, lesion or rash.   Neurological:      Mental Status: She is alert and oriented to person, place, and time.      Motor: No weakness.      Coordination: Coordination normal.   Psychiatric:         Mood and Affect: Mood and affect normal.           LAB RESULTS  Lab Results (last 24 hours)       ** No results found for the last 24 hours. **              RADIOLOGY  No Radiology Exams Resulted Within Past 24 Hours        PROCEDURES  Procedures      PROGRESS AND CONSULTS  ED Course as of 10/11/23 1914   Wed Oct 11, 2023   1821 The patient appears well.  She is only complaining of hypertension.  She states that she called her PCP yesterday regarding her elevated blood pressure and never heard back from her PCP after 3 calls were made.  Her nephrologist called her today to check on her blood pressure again and advised her to go to the ED.  The patient denies any pain or other complaints.  She states that she recently switched from amlodipine to lisinopril.  She takes 5 mg of lisinopril once a day in the morning. []   1911 Patient's BP has greatly improved. She was  encouraged to follow up with her PCP as soon as possible, which she understands. She advises that she hopes to see her PCP or at least get a return call in the morning. She still denies any complaints at this time. She is comfortable being discharged home at this time. Follow up instructions given. Return to the ED instructions given.  [AH]      ED Course User Index  [AH] Mirian Paula PA-C           MEDICAL DECISION MAKING    MDM       Differential diagnosis includes 2 primary hypertension, secondary hypertension, elevated blood pressure reading, false blood pressure reading.  DIAGNOSIS  Final diagnoses:   Hypertension, unspecified type       Latest Documented Vital Signs:  As of 19:14 EDT  BP- 165/99 HR- 66 Temp- 98.7 øF (37.1 øC) (Oral) O2 sat- 96%    DISPOSITION  Pt discharged    Discussed pertinent findings with the patient/family.  Patient/Family voiced understanding of need to follow-up for recheck and further testing as needed.  Return to the Emergency Department warnings were given.         Medication List      No changes were made to your prescriptions during this visit.              Follow-up Information       Chey Ledezma MD. Call today.    Specialties: Family Medicine, Emergency Medicine  Why: to schedule follow up  Contact information:  [address] [address] [address] [phone]               Go to  [facility] EMERGENCY DEPARTMENT.    Specialty: Emergency Medicine  Why: If symptoms worsen  Contact information:  [address] [facility] [address] [phone]                             Dictated utilizing Dragon dictation     Mirian Paula PA-C  10/11/23 1914

## 2023-10-11 NOTE — DISCHARGE INSTRUCTIONS
Continue medication as directed.  Take blood pressure twice daily and record.  Take this record to follow-up with your PCP.  Follow-up with your PCP as above.  Return to ED for uncontrolled hypertension, medical emergencies.

## 2023-11-03 ENCOUNTER — TRANSCRIBE ORDERS (OUTPATIENT)
Dept: ADMINISTRATIVE | Facility: HOSPITAL | Age: 73
End: 2023-11-03
Payer: MEDICARE

## 2023-11-03 ENCOUNTER — LAB (OUTPATIENT)
Dept: LAB | Facility: HOSPITAL | Age: 73
End: 2023-11-03
Payer: MEDICARE

## 2023-11-03 DIAGNOSIS — D64.9 ANEMIA, UNSPECIFIED TYPE: ICD-10-CM

## 2023-11-03 DIAGNOSIS — E55.9 AVITAMINOSIS D: ICD-10-CM

## 2023-11-03 DIAGNOSIS — E21.3 HYPERPARATHYROIDISM, UNSPECIFIED: ICD-10-CM

## 2023-11-03 DIAGNOSIS — I10 ESSENTIAL HYPERTENSION, MALIGNANT: ICD-10-CM

## 2023-11-03 DIAGNOSIS — I89.0 OBLITERATION OF LYMPHATIC VESSEL: ICD-10-CM

## 2023-11-03 DIAGNOSIS — N18.2 CHRONIC KIDNEY DISEASE, STAGE II (MILD): ICD-10-CM

## 2023-11-03 DIAGNOSIS — N28.89 URETERAL FISTULA: ICD-10-CM

## 2023-11-03 DIAGNOSIS — E83.52 HYPERCALCEMIA: ICD-10-CM

## 2023-11-03 DIAGNOSIS — N18.2 CHRONIC KIDNEY DISEASE, STAGE II (MILD): Primary | ICD-10-CM

## 2023-11-03 LAB
ALBUMIN SERPL-MCNC: 3.9 G/DL (ref 3.5–5.2)
ANION GAP SERPL CALCULATED.3IONS-SCNC: 9.4 MMOL/L (ref 5–15)
BUN SERPL-MCNC: 18 MG/DL (ref 8–23)
BUN/CREAT SERPL: 14.9 (ref 7–25)
CALCIUM SPEC-SCNC: 8.7 MG/DL (ref 8.6–10.5)
CHLORIDE SERPL-SCNC: 106 MMOL/L (ref 98–107)
CO2 SERPL-SCNC: 27.6 MMOL/L (ref 22–29)
CREAT SERPL-MCNC: 1.21 MG/DL (ref 0.57–1)
EGFRCR SERPLBLD CKD-EPI 2021: 46.8 ML/MIN/1.73
GLUCOSE SERPL-MCNC: 98 MG/DL (ref 65–99)
PHOSPHATE SERPL-MCNC: 5.2 MG/DL (ref 2.5–4.5)
POTASSIUM SERPL-SCNC: 5.9 MMOL/L (ref 3.5–5.2)
PTH-INTACT SERPL-MCNC: 160 PG/ML (ref 15–65)
SODIUM SERPL-SCNC: 143 MMOL/L (ref 136–145)

## 2023-11-03 PROCEDURE — 83970 ASSAY OF PARATHORMONE: CPT

## 2023-11-03 PROCEDURE — 36415 COLL VENOUS BLD VENIPUNCTURE: CPT

## 2023-11-03 PROCEDURE — 80069 RENAL FUNCTION PANEL: CPT

## 2023-11-15 ENCOUNTER — LAB (OUTPATIENT)
Dept: LAB | Facility: HOSPITAL | Age: 73
End: 2023-11-15
Payer: MEDICARE

## 2023-11-15 ENCOUNTER — TRANSCRIBE ORDERS (OUTPATIENT)
Dept: ADMINISTRATIVE | Facility: HOSPITAL | Age: 73
End: 2023-11-15
Payer: MEDICARE

## 2023-11-15 DIAGNOSIS — E87.5 HYPERPOTASSEMIA: ICD-10-CM

## 2023-11-15 DIAGNOSIS — I10 ESSENTIAL HYPERTENSION, MALIGNANT: Primary | ICD-10-CM

## 2023-11-15 DIAGNOSIS — I10 ESSENTIAL HYPERTENSION, MALIGNANT: ICD-10-CM

## 2023-11-15 LAB
ALBUMIN SERPL-MCNC: 3.7 G/DL (ref 3.5–5.2)
ANION GAP SERPL CALCULATED.3IONS-SCNC: 9.5 MMOL/L (ref 5–15)
BUN SERPL-MCNC: 17 MG/DL (ref 8–23)
BUN/CREAT SERPL: 12.9 (ref 7–25)
CALCIUM SPEC-SCNC: 8.2 MG/DL (ref 8.6–10.5)
CHLORIDE SERPL-SCNC: 105 MMOL/L (ref 98–107)
CO2 SERPL-SCNC: 27.5 MMOL/L (ref 22–29)
CREAT SERPL-MCNC: 1.32 MG/DL (ref 0.57–1)
EGFRCR SERPLBLD CKD-EPI 2021: 42.2 ML/MIN/1.73
GLUCOSE SERPL-MCNC: 89 MG/DL (ref 65–99)
PHOSPHATE SERPL-MCNC: 5.4 MG/DL (ref 2.5–4.5)
POTASSIUM SERPL-SCNC: 5.4 MMOL/L (ref 3.5–5.2)
SODIUM SERPL-SCNC: 142 MMOL/L (ref 136–145)

## 2023-11-15 PROCEDURE — 80069 RENAL FUNCTION PANEL: CPT

## 2023-11-15 PROCEDURE — 36415 COLL VENOUS BLD VENIPUNCTURE: CPT

## 2023-12-06 ENCOUNTER — TELEPHONE (OUTPATIENT)
Dept: SURGERY | Facility: CLINIC | Age: 73
End: 2023-12-06
Payer: MEDICARE

## 2023-12-06 NOTE — TELEPHONE ENCOUNTER
LM for pt to call to sched a f/u appt with Dr Knowles to reassess parathyroid (last seen '21).    HUB: ok to sched if pt calls or can transfer to office

## 2023-12-18 ENCOUNTER — TELEPHONE (OUTPATIENT)
Dept: CARDIOLOGY | Facility: CLINIC | Age: 73
End: 2023-12-18

## 2023-12-18 NOTE — TELEPHONE ENCOUNTER
Caller: Norma Ro    Relationship to patient: Self    Best call back number:[phone]    Chief complaint:PATIENT WAS IN HOSPITAL ([facility] FOR 16 DAYS) UNTIL FRIDAY WITH COVID, SHE WAS TOLD SHE MIGHT HAVE HAD A HEART ATTACK    Type of visit: HOSPITAL FOLLOW  UP    Requested date: ASAP

## 2024-01-03 NOTE — PROGRESS NOTES
SURGERY  Norma Hernandezton   1950 01/03/24    Chief Complaint:  Complex multiple ventral hernias    HPI    Patient is a nice 73 y.o. female who presents with her ventral hernias, noted 2019 when she came in with hyperparathyroidism.  At that time, i felt like she had a hernia which might need addressing.  She has had this for some time, and it was evaluated at [facility] in [place], and no pursuit of repair felt advisable.     Now, she has undergone a parathyroidectomy and has done well.  Her PTH has been intermittently elevated (???lab error) but last one was 27.  She wants to have her hernia addressed.  This all began after a gastric bypass which was initially successful with weight loss then recidivism.  She had a laparoscopic repair at [facility] in 2014 which I was able to obtain the notes from and reviewed.  There was entry into the abdomen under direct vision in the lower abdomen, adhesiolysis for one hours and then 2 separate pieces of mesh, a 20 cm seprafilm and an 8 cm goretex with greater than 50 tacs.  (Permasorb)       At her last visit, I talked about the significant risk surgery would entail and we discussed the fact that I would not operate on her with a BMI of 44.  She was referred to [facility] for surgery and happily she is already lost 40 pounds now with the HMR.  She has had problems with 2 surgeries in the interim, both with her back, needing a wound vac and has what appears to be a large seroma on her right hip from her hip replacement.  She believes that she can lose more and wants to do so.  Her symptoms have actually abated somewhat since she has had the weight loss.      She has chronic kidney disease stage III, with Vitamin D deficiency, and had a partial nephrectomy for cancer with reported non negative margins, Ahsan Hawley.      She has a family history of Factor V so she was checked and was negative.  No family history of parathyroid problems, but her mother was  hypothyroid.    Since her last visit, CT was obtained (no IV contrast) that showed no visible hernia.  There is marked laxity of the abdominal wall, with possible disruption of the internal oblique, covered by the external oblique, but no distinct hernia.  There is a large amount of mesh.      Past Medical History:   Diagnosis Date    Agatston CAC score, <100     2017: 47    Anemia     Arthritis     Broken bones     Chronic kidney disease     Depression     Depression     False positive stress test     2017 -- coronary artery CTA showed no significant CAD    GERD (gastroesophageal reflux disease)     Headache     occ    Hearing loss     asim hearing aids    History of blood clots     pt states no    History of cardiovascular stress test     normal Cardiolite 9/2016    Hypertension     Hyperthyroidism     Hypoglycemia     Rebound hypoglycemia    Hypothyroid     Hypothyroidism     Incisional hernia     s/p surgical repair, 2014    Incontinence in female     Lower back pain     Morbid obesity     s/p remote gastric bypass    Osteoarthritis     Sinus bradycardia     UTI (urinary tract infection)     Varicose veins      Past Surgical History:   Procedure Laterality Date    ANKLE ARTHRODESIS Right 01/23/2019    Right open ankle arthrodesis, harvest of proximal tibial bone graft as major graft, exostectomy, medial great toe-Dr. Santy Romero    BACK SURGERY      BLADDER SUSPENSION      CARPAL TUNNEL RELEASE Left     CHOLECYSTECTOMY      COLONOSCOPY N/A 11/3/2017    Procedure: COLONOSCOPY; polypectomy;  Surgeon: Chevy Zuniga MD;  Location:  [facility];  Service:     COLONOSCOPY N/A 03/25/2007    Normal-Dr. Timoteo Prado    COLONOSCOPY W/ POLYPECTOMY N/A 1/4/2023    Procedure: COLONOSCOPY WITH POLYPECTOMY;  Surgeon: Chevy Zuniga MD;  Location:  [facility];  Service: Gastroenterology;  Laterality: N/A;  ascending polyp x1    EXTERNAL FIXATOR APPLICATION      EYE SURGERY  08/26/2020    Dr. Avalos     GASTRIC BYPASS      1980s    HERNIA REPAIR  2014    LUMBAR FUSION Bilateral 02/04/2021    NEPHRECTOMY PARTIAL Left 3/9/2022    Procedure: NEPHRECTOMY PARTIAL LAPAROSCOPIC;  Surgeon: Yves Hawley MD;  Location:  [facility];  Service: Urology;  Laterality: Left;    ORIF TIBIA/FIBULA FRACTURES Right 2005    PARATHYROIDECTOMY N/A 11/20/2019    Procedure: left inferior parathyroid mediastinal resection and right inferior parathyroid resection, thyroid mass resection;  Surgeon: Gianna Knowles MD;  Location:  [facility];  Service: General    TOTAL HIP ARTHROPLASTY Right 07/2021    UPPER GASTROINTESTINAL ENDOSCOPY N/A 03/25/2007    No gross lesions in the esophagus, non-bleeding erythematous gastropathy, antrectomy with a small gastric pouch remnant, Billroth II anatomy with a gastrojejunostomy-Dr. Timoteo Prado     Family History   Problem Relation Age of Onset    Aortic aneurysm Father         abdominal    Heart disease Father     Hypertension Brother     Stroke Brother     Intracerebral hemorrhage Brother     Heart disease Brother     Heart disease Maternal Grandfather     Stroke Paternal Grandmother     Heart disease Paternal Grandfather     Diabetes Mother     Heart disease Mother     Malig Hyperthermia Neg Hx      Social History     Socioeconomic History    Marital status:    Tobacco Use    Smoking status: Never    Smokeless tobacco: Never    Tobacco comments:     secondary smoke   Vaping Use    Vaping Use: Never used   Substance and Sexual Activity    Alcohol use: No     Comment: very seldom    Drug use: No    Sexual activity: Not Currently     Partners: Male     Birth control/protection: Post-menopausal, None     Comment:  is impotent         Current Outpatient Medications:     Acetaminophen (TYLENOL ARTHRITIS PAIN PO), Take 650 mg by mouth 2 (Two) Times a Day., Disp: , Rfl:     B Complex Vitamins (VITAMIN B COMPLEX) capsule capsule, Take 1 capsule by mouth Every Night., Disp: , Rfl:      calcitriol (ROCALTROL) 0.5 MCG capsule, Take 0.75 mcg by mouth Daily., Disp: , Rfl:     Cholecalciferol (VITAMIN D3) 75 MCG (3000 UT) tablet, Take 3,000 Units by mouth Daily., Disp: , Rfl:     cyclobenzaprine (FLEXERIL) 5 MG tablet, Take 5 mg by mouth As Needed., Disp: , Rfl:     DULoxetine (CYMBALTA) 30 MG capsule, Take 30 mg by mouth 2 (Two) Times a Day., Disp: , Rfl:     estradiol (ESTRACE) 0.1 MG/GM vaginal cream, Insert 1 g into the vagina., Disp: , Rfl:     famotidine (PEPCID) 20 MG tablet, Take 20 mg by mouth Daily., Disp: , Rfl:     ferrous sulfate 325 (65 FE) MG tablet, Take 325 mg by mouth Every Other Day., Disp: , Rfl:     gabapentin (NEURONTIN) 600 MG tablet, Take 300 mg by mouth 2 (Two) Times a Day., Disp: , Rfl:     HYDROcodone-acetaminophen (NORCO) 5-325 MG per tablet, Take 1 tablet by mouth Every 6 (Six) Hours As Needed., Disp: , Rfl:     levothyroxine (SYNTHROID, LEVOTHROID) 112 MCG tablet, , Disp: , Rfl:     lisinopril (PRINIVIL,ZESTRIL) 5 MG tablet, Take 5 mg by mouth Daily., Disp: , Rfl:     Loratadine 10 MG capsule, Take 10 mg by mouth As Needed., Disp: , Rfl:     midodrine (PROAMATINE) 2.5 MG tablet, , Disp: , Rfl:     OLANZapine (zyPREXA) 2.5 MG tablet, Take 1 tablet by mouth Every Night., Disp: , Rfl:     polyethylene glycol (MIRALAX) 17 GM/SCOOP powder, Take 17 g by mouth As Needed., Disp: , Rfl:     Probiotic Product (PROBIOTIC PO), Take 1 capsule by mouth As Needed., Disp: , Rfl:     traMADol (ULTRAM) 50 MG tablet, Take 50 mg by mouth Every 6 (Six) Hours As Needed., Disp: , Rfl:     traZODone (DESYREL) 50 MG tablet, Take 50 mg by mouth As Needed for Sleep., Disp: , Rfl:     vitamin B-12 (CYANOCOBALAMIN) 1000 MCG tablet, Take 1,000 mcg by mouth Daily., Disp: , Rfl:     Allergies   Allergen Reactions    Baclofen Dizziness and Arrhythmia    Morphine Itching and Other (See Comments)     Chest tightness, Abdominal Pain    Nsaids Other (See Comments)     DUE TO CKD STAGE III    Flagyl  "[Metronidazole] Other (See Comments)     headaches    Lodine [Etodolac] Hives     Blotches under the skin    Macrodantin [Nitrofurantoin] Other (See Comments)     headaches    Other Rash     LODINE  BLOTCHES ON SKIN         Tetracyclines & Related Hives and Rash     Review of Systems   Constitutional:  Positive for unexpected weight loss. Negative for fever.   HENT:  Positive for tinnitus.    Gastrointestinal:  Positive for abdominal pain, constipation, diarrhea and nausea. Negative for vomiting.   Genitourinary:  Positive for frequency. Negative for dysuria and hematuria.   Musculoskeletal:  Positive for arthralgias, back pain, myalgias and bursitis.   Neurological:  Positive for weakness, light-headedness and headache.   Psychiatric/Behavioral:  Positive for sleep disturbance and depressed mood.    All other systems reviewed and are negative.    PHYSICAL EXAM:    /98   Ht 157.5 cm (62\")   Wt 117 kg (258 lb 3.2 oz)   BMI 47.23 kg/m²   Class 3 Severe Obesity (BMI >=40). Obesity-related health conditions include the following: hypertension, GERD, and osteoarthritis. Obesity is  same, not improved after i sent her to [facility] for HMR, initially better but with diagnosis of cancer, in both her  and herself, she slid back up. . BMI is  about the same . We discussed portion control, increasing exercise, and she is just getting over COVID for which she was admitted to the hospital for over 2 weeks.  She is going to try with dieting again .     Constitutional: well developed, well nourished, appears moderately healthy, stated age, increased oral motion  Eyes: sclera nonicteric, conjunctiva not injected   ENMT: Hearing intact, trachea midline, thyroid without masses  CVS: RRR, no murmur, peripheral edema 1+, varicosities on left leg  Respiratory: CTA, normal respiratory effort   Gastrointestinal: no hepatosplenomegaly, abdomen obese, with pannus, abdominal hernia suspected in the left and right " aspect of her chevron incisional scar.  That on the left with a billowy character, and today perhaps even more consistent with simply thinning abdominal wall that on the right with nodules that appear to be incarcerated, with some in the midline as well that are present, with Swiss cheese effect.  The incisional scar is at least 3 cm below the area of the fascial defect, illustrating the amount of ptosis of the skin that she has.  She has a large pannus and is wondering about having that removed as well  Genitourinary: inguinal hernia not detected  Musculoskeletal: gait slowed and swinging, muscle mass normal incisional scar on lower anterior, ankle from recent surgery  Skin: warm and dry, no rashes visible  Neurological: awake and alert, seems to have reasonable capacity for understanding for medical decision making  Psychiatric: appears to have reasonable judgement, pleasant  Lymphatics: no cervical adenopathy    Radiographic Findings: CT scan from [place] daughter with apparent suggestion as to a spigellian hernia as well as recurrent ventral hernia.  That CT was years ago    IMPRESSION:  Multiple incisional hernias suspected by exam, but none by CT, would be recurrent with hernia repair elsewhere with 20 cm Seprafilm, 8 cm Pine-Jayme .    Hyperparathyroidism status post mediastinal adenoma on the left resection with resolution, with what apparently was factitious elevation of PTH last year.  Most recent PTH 27.  Urine calcium quite low at 28/24 hours.  Being followed by Dr Mazin Ortiz   Vitamin D deficiency, on calcitriol  Chronic kidney disease stage II/III.   Kidney cancer  Depression, chronic on duloxetine, not felt attributable to hyperparathyroidism  Hypothyroidism  BMI 47  Chronic pain on tramadol  Anemia on iron supplement, status post gastric bypass    PLAN:  Continue weight loss.  This will help with symptoms and increase the chances that her thin abdominal wall will not tear.  She understands that.     Recover from COVID  Continue with medical management of her parathyroid issues.  I don't think surgery would help now.    Discussed with patient increased perioperative risks associated with obesity including increased risks of DVT, infection, seromas, poor wound healing and hernias (with abdominal surgery).  Defer addressing abdominal wall at this time.  With her gastric bypass, calcium absorption can be problematic.      Gianna Knowles MD  01/03/24    Greater than 25 minutes spent with over half in counseling face-to-face

## 2024-01-08 ENCOUNTER — OFFICE VISIT (OUTPATIENT)
Dept: SURGERY | Facility: CLINIC | Age: 74
End: 2024-01-08
Payer: MEDICARE

## 2024-01-08 VITALS
BODY MASS INDEX: 47.52 KG/M2 | DIASTOLIC BLOOD PRESSURE: 98 MMHG | WEIGHT: 258.2 LBS | SYSTOLIC BLOOD PRESSURE: 150 MMHG | HEIGHT: 62 IN

## 2024-01-08 DIAGNOSIS — Z86.39 HISTORY OF HYPERPARATHYROIDISM: Primary | ICD-10-CM

## 2024-01-08 DIAGNOSIS — E66.01 MORBID OBESITY WITH BMI OF 40.0-44.9, ADULT: ICD-10-CM

## 2024-01-08 PROCEDURE — 99213 OFFICE O/P EST LOW 20 MIN: CPT | Performed by: SURGERY

## 2024-01-08 PROCEDURE — 1159F MED LIST DOCD IN RCRD: CPT | Performed by: SURGERY

## 2024-01-08 PROCEDURE — 3080F DIAST BP >= 90 MM HG: CPT | Performed by: SURGERY

## 2024-01-08 PROCEDURE — 3077F SYST BP >= 140 MM HG: CPT | Performed by: SURGERY

## 2024-01-08 PROCEDURE — 1160F RVW MEDS BY RX/DR IN RCRD: CPT | Performed by: SURGERY

## 2024-01-08 RX ORDER — ASPIRIN 81 MG/1
81 TABLET ORAL DAILY
COMMUNITY

## 2024-01-08 RX ORDER — METHOCARBAMOL 750 MG/1
750 TABLET, FILM COATED ORAL 4 TIMES DAILY PRN
COMMUNITY

## 2024-01-08 RX ORDER — LORAZEPAM 1 MG/1
1 TABLET ORAL EVERY 8 HOURS PRN
COMMUNITY

## 2024-01-08 NOTE — LETTER
January 8, 2024       No Recipients    Patient: Norma Ro   YOB: 1950   Date of Visit: 1/8/2024     Dear Marcelino Levine MD:       Thank you for referring Norma Ro to me for evaluation. Below are the relevant portions of my assessment and plan of care.    If you have questions, please do not hesitate to call me. I look forward to following Norma along with you.         Sincerely,        Gianna Knowles MD        CC:   No Recipients    Gianna Knowles MD  01/08/24 1356  Signed  SURGERY  Norma Ro   1950 01/03/24    Chief Complaint:  Complex multiple ventral hernias    HPI    Patient is a nice 73 y.o. female who presents with her ventral hernias, noted 2019 when she came in with hyperparathyroidism.  At that time, i felt like she had a hernia which might need addressing.  She has had this for some time, and it was evaluated at [facility] in [place], and no pursuit of repair felt advisable.     Now, she has undergone a parathyroidectomy and has done well.  Her PTH has been intermittently elevated (???lab error) but last one was 27.  She wants to have her hernia addressed.  This all began after a gastric bypass which was initially successful with weight loss then recidivism.  She had a laparoscopic repair at [facility] in 2014 which I was able to obtain the notes from and reviewed.  There was entry into the abdomen under direct vision in the lower abdomen, adhesiolysis for one hours and then 2 separate pieces of mesh, a 20 cm seprafilm and an 8 cm goretex with greater than 50 tacs.  (Permasorb)       At her last visit, I talked about the significant risk surgery would entail and we discussed the fact that I would not operate on her with a BMI of 44.  She was referred to [facility] for surgery and happily she is already lost 40 pounds now with the HMR.  She has had problems with 2 surgeries in the interim, both with her back, needing a wound vac and has what appears  to be a large seroma on her right hip from her hip replacement.  She believes that she can lose more and wants to do so.  Her symptoms have actually abated somewhat since she has had the weight loss.      She has chronic kidney disease stage III, with Vitamin D deficiency, and had a partial nephrectomy for cancer with reported non negative margins, Ahsan Hawley.      She has a family history of Factor V so she was checked and was negative.  No family history of parathyroid problems, but her mother was hypothyroid.    Since her last visit, CT was obtained (no IV contrast) that showed no visible hernia.  There is marked laxity of the abdominal wall, with possible disruption of the internal oblique, covered by the external oblique, but no distinct hernia.  There is a large amount of mesh.      Past Medical History:   Diagnosis Date   • Agatston CAC score, <100     2017: 47   • Anemia    • Arthritis    • Broken bones    • Chronic kidney disease    • Depression    • Depression    • False positive stress test     2017 -- coronary artery CTA showed no significant CAD   • GERD (gastroesophageal reflux disease)    • Headache     occ   • Hearing loss     asim hearing aids   • History of blood clots     pt states no   • History of cardiovascular stress test     normal Cardiolite 9/2016   • Hypertension    • Hyperthyroidism    • Hypoglycemia     Rebound hypoglycemia   • Hypothyroid    • Hypothyroidism    • Incisional hernia     s/p surgical repair, 2014   • Incontinence in female    • Lower back pain    • Morbid obesity     s/p remote gastric bypass   • Osteoarthritis    • Sinus bradycardia    • UTI (urinary tract infection)    • Varicose veins      Past Surgical History:   Procedure Laterality Date   • ANKLE ARTHRODESIS Right 01/23/2019    Right open ankle arthrodesis, harvest of proximal tibial bone graft as major graft, exostectomy, medial great toe-Dr. Santy Romero   • BACK SURGERY     • BLADDER SUSPENSION     • CARPAL  TUNNEL RELEASE Left    • CHOLECYSTECTOMY     • COLONOSCOPY N/A 11/3/2017    Procedure: COLONOSCOPY; polypectomy;  Surgeon: Chevy Zuniga MD;  Location:  [facility];  Service:    • COLONOSCOPY N/A 03/25/2007    Normal-Dr. Timoteo Prado   • COLONOSCOPY W/ POLYPECTOMY N/A 1/4/2023    Procedure: COLONOSCOPY WITH POLYPECTOMY;  Surgeon: Chevy Zuniga MD;  Location:  [facility] OR;  Service: Gastroenterology;  Laterality: N/A;  ascending polyp x1   • EXTERNAL FIXATOR APPLICATION     • EYE SURGERY  08/26/2020    Dr. Avalos   • GASTRIC BYPASS      1980s   • HERNIA REPAIR  2014   • LUMBAR FUSION Bilateral 02/04/2021   • NEPHRECTOMY PARTIAL Left 3/9/2022    Procedure: NEPHRECTOMY PARTIAL LAPAROSCOPIC;  Surgeon: Yves Hawley MD;  Location:  [facility] OR;  Service: Urology;  Laterality: Left;   • ORIF TIBIA/FIBULA FRACTURES Right 2005   • PARATHYROIDECTOMY N/A 11/20/2019    Procedure: left inferior parathyroid mediastinal resection and right inferior parathyroid resection, thyroid mass resection;  Surgeon: Gianna Knowles MD;  Location:  [facility];  Service: General   • TOTAL HIP ARTHROPLASTY Right 07/2021   • UPPER GASTROINTESTINAL ENDOSCOPY N/A 03/25/2007    No gross lesions in the esophagus, non-bleeding erythematous gastropathy, antrectomy with a small gastric pouch remnant, Billroth II anatomy with a gastrojejunostomy-Dr. Timoteo Prado     Family History   Problem Relation Age of Onset   • Aortic aneurysm Father         abdominal   • Heart disease Father    • Hypertension Brother    • Stroke Brother    • Intracerebral hemorrhage Brother    • Heart disease Brother    • Heart disease Maternal Grandfather    • Stroke Paternal Grandmother    • Heart disease Paternal Grandfather    • Diabetes Mother    • Heart disease Mother    • Malig Hyperthermia Neg Hx      Social History     Socioeconomic History   • Marital status:    Tobacco Use   • Smoking status: Never   • Smokeless tobacco: Never   •  Tobacco comments:     secondary smoke   Vaping Use   • Vaping Use: Never used   Substance and Sexual Activity   • Alcohol use: No     Comment: very seldom   • Drug use: No   • Sexual activity: Not Currently     Partners: Male     Birth control/protection: Post-menopausal, None     Comment:  is impotent         Current Outpatient Medications:   •  Acetaminophen (TYLENOL ARTHRITIS PAIN PO), Take 650 mg by mouth 2 (Two) Times a Day., Disp: , Rfl:   •  B Complex Vitamins (VITAMIN B COMPLEX) capsule capsule, Take 1 capsule by mouth Every Night., Disp: , Rfl:   •  calcitriol (ROCALTROL) 0.5 MCG capsule, Take 0.75 mcg by mouth Daily., Disp: , Rfl:   •  Cholecalciferol (VITAMIN D3) 75 MCG (3000 UT) tablet, Take 3,000 Units by mouth Daily., Disp: , Rfl:   •  cyclobenzaprine (FLEXERIL) 5 MG tablet, Take 5 mg by mouth As Needed., Disp: , Rfl:   •  DULoxetine (CYMBALTA) 30 MG capsule, Take 30 mg by mouth 2 (Two) Times a Day., Disp: , Rfl:   •  estradiol (ESTRACE) 0.1 MG/GM vaginal cream, Insert 1 g into the vagina., Disp: , Rfl:   •  famotidine (PEPCID) 20 MG tablet, Take 20 mg by mouth Daily., Disp: , Rfl:   •  ferrous sulfate 325 (65 FE) MG tablet, Take 325 mg by mouth Every Other Day., Disp: , Rfl:   •  gabapentin (NEURONTIN) 600 MG tablet, Take 300 mg by mouth 2 (Two) Times a Day., Disp: , Rfl:   •  HYDROcodone-acetaminophen (NORCO) 5-325 MG per tablet, Take 1 tablet by mouth Every 6 (Six) Hours As Needed., Disp: , Rfl:   •  levothyroxine (SYNTHROID, LEVOTHROID) 112 MCG tablet, , Disp: , Rfl:   •  lisinopril (PRINIVIL,ZESTRIL) 5 MG tablet, Take 5 mg by mouth Daily., Disp: , Rfl:   •  Loratadine 10 MG capsule, Take 10 mg by mouth As Needed., Disp: , Rfl:   •  midodrine (PROAMATINE) 2.5 MG tablet, , Disp: , Rfl:   •  OLANZapine (zyPREXA) 2.5 MG tablet, Take 1 tablet by mouth Every Night., Disp: , Rfl:   •  polyethylene glycol (MIRALAX) 17 GM/SCOOP powder, Take 17 g by mouth As Needed., Disp: , Rfl:   •  Probiotic  "Product (PROBIOTIC PO), Take 1 capsule by mouth As Needed., Disp: , Rfl:   •  traMADol (ULTRAM) 50 MG tablet, Take 50 mg by mouth Every 6 (Six) Hours As Needed., Disp: , Rfl:   •  traZODone (DESYREL) 50 MG tablet, Take 50 mg by mouth As Needed for Sleep., Disp: , Rfl:   •  vitamin B-12 (CYANOCOBALAMIN) 1000 MCG tablet, Take 1,000 mcg by mouth Daily., Disp: , Rfl:     Allergies   Allergen Reactions   • Baclofen Dizziness and Arrhythmia   • Morphine Itching and Other (See Comments)     Chest tightness, Abdominal Pain   • Nsaids Other (See Comments)     DUE TO CKD STAGE III   • Flagyl [Metronidazole] Other (See Comments)     headaches   • Lodine [Etodolac] Hives     Blotches under the skin   • Macrodantin [Nitrofurantoin] Other (See Comments)     headaches   • Other Rash     LODINE  BLOTCHES ON SKIN        • Tetracyclines & Related Hives and Rash     Review of Systems   Constitutional:  Positive for unexpected weight loss. Negative for fever.   HENT:  Positive for tinnitus.    Gastrointestinal:  Positive for abdominal pain, constipation, diarrhea and nausea. Negative for vomiting.   Genitourinary:  Positive for frequency. Negative for dysuria and hematuria.   Musculoskeletal:  Positive for arthralgias, back pain, myalgias and bursitis.   Neurological:  Positive for weakness, light-headedness and headache.   Psychiatric/Behavioral:  Positive for sleep disturbance and depressed mood.    All other systems reviewed and are negative.    PHYSICAL EXAM:    /98   Ht 157.5 cm (62\")   Wt 117 kg (258 lb 3.2 oz)   BMI 47.23 kg/m²   Class 3 Severe Obesity (BMI >=40). Obesity-related health conditions include the following: hypertension, GERD, and osteoarthritis. Obesity is  same, not improved after i sent her to [facility] for R, initially better but with diagnosis of cancer, in both her  and herself, she slid back up. . BMI is  about the same . We discussed portion control, increasing exercise, and she is " just getting over COVID for which she was admitted to the hospital for over 2 weeks.  She is going to try with dieting again .     Constitutional: well developed, well nourished, appears moderately healthy, stated age, increased oral motion  Eyes: sclera nonicteric, conjunctiva not injected   ENMT: Hearing intact, trachea midline, thyroid without masses  CVS: RRR, no murmur, peripheral edema 1+, varicosities on left leg  Respiratory: CTA, normal respiratory effort   Gastrointestinal: no hepatosplenomegaly, abdomen obese, with pannus, abdominal hernia suspected in the left and right aspect of her chevron incisional scar.  That on the left with a billowy character, and today perhaps even more consistent with simply thinning abdominal wall that on the right with nodules that appear to be incarcerated, with some in the midline as well that are present, with Swiss cheese effect.  The incisional scar is at least 3 cm below the area of the fascial defect, illustrating the amount of ptosis of the skin that she has.  She has a large pannus and is wondering about having that removed as well  Genitourinary: inguinal hernia not detected  Musculoskeletal: gait slowed and swinging, muscle mass normal incisional scar on lower anterior, ankle from recent surgery  Skin: warm and dry, no rashes visible  Neurological: awake and alert, seems to have reasonable capacity for understanding for medical decision making  Psychiatric: appears to have reasonable judgement, pleasant  Lymphatics: no cervical adenopathy    Radiographic Findings: CT scan from [place] daughter with apparent suggestion as to a spigellian hernia as well as recurrent ventral hernia.  That CT was years ago    IMPRESSION:  Multiple incisional hernias suspected by exam, but none by CT, would be recurrent with hernia repair elsewhere with 20 cm Seprafilm, 8 cm Brandywine-Jayme .    Hyperparathyroidism status post mediastinal adenoma on the left resection with resolution, with what  apparently was factitious elevation of PTH last year.  Most recent PTH 27.  Urine calcium quite low at 28/24 hours.  Being followed by Dr Mazin Ortiz   Vitamin D deficiency, on calcitriol  Chronic kidney disease stage II/III.   Kidney cancer  Depression, chronic on duloxetine, not felt attributable to hyperparathyroidism  Hypothyroidism  BMI 47  Chronic pain on tramadol  Anemia on iron supplement, status post gastric bypass    PLAN:  Continue weight loss.  This will help with symptoms and increase the chances that her thin abdominal wall will not tear.  She understands that.    Recover from COVID  Continue with medical management of her parathyroid issues.  I don't think surgery would help now.    Discussed with patient increased perioperative risks associated with obesity including increased risks of DVT, infection, seromas, poor wound healing and hernias (with abdominal surgery).  Defer addressing abdominal wall at this time.  With her gastric bypass, calcium absorption can be problematic.      Gianna Knowles MD  01/03/24    Greater than 25 minutes spent with over half in counseling face-to-face

## 2024-01-09 ENCOUNTER — TELEPHONE (OUTPATIENT)
Dept: CARDIOLOGY | Facility: CLINIC | Age: 74
End: 2024-01-09
Payer: MEDICARE

## 2024-01-09 NOTE — TELEPHONE ENCOUNTER
"Called pt to schedule office appt.  Pt lives in [place], so [place] would be the best location.  I had to leave a .      Relay     \"Appointment can be with Dr. Martinez, or and [facility] in [place].\"    Please schedule pt if she calls back.                    "

## 2024-01-18 ENCOUNTER — HOSPITAL ENCOUNTER (EMERGENCY)
Facility: HOSPITAL | Age: 74
Discharge: HOME | End: 2024-01-18
Attending: EMERGENCY MEDICINE | Admitting: EMERGENCY MEDICINE
Payer: MEDICARE

## 2024-01-18 VITALS
TEMPERATURE: 98.5 F | WEIGHT: 250 LBS | OXYGEN SATURATION: 96 % | DIASTOLIC BLOOD PRESSURE: 74 MMHG | HEART RATE: 75 BPM | HEIGHT: 62 IN | RESPIRATION RATE: 16 BRPM | BODY MASS INDEX: 46.01 KG/M2 | SYSTOLIC BLOOD PRESSURE: 132 MMHG

## 2024-01-18 DIAGNOSIS — N39.0 ACUTE UTI: ICD-10-CM

## 2024-01-18 DIAGNOSIS — I95.89 OTHER SPECIFIED HYPOTENSION: Primary | ICD-10-CM

## 2024-01-18 LAB
ALBUMIN SERPL-MCNC: 3.7 G/DL (ref 3.5–5.2)
ALBUMIN/GLOB SERPL: 2.2 G/DL
ALP SERPL-CCNC: 86 U/L (ref 39–117)
ALT SERPL W P-5'-P-CCNC: 9 U/L (ref 1–33)
ANION GAP SERPL CALCULATED.3IONS-SCNC: 10.4 MMOL/L (ref 5–15)
AST SERPL-CCNC: 17 U/L (ref 1–32)
BACTERIA UR QL AUTO: ABNORMAL /HPF
BASOPHILS # BLD AUTO: 0.06 10*3/MM3 (ref 0–0.2)
BASOPHILS NFR BLD AUTO: 0.6 % (ref 0–1.5)
BILIRUB SERPL-MCNC: 0.4 MG/DL (ref 0–1.2)
BILIRUB UR QL STRIP: ABNORMAL
BUN SERPL-MCNC: 22 MG/DL (ref 8–23)
BUN/CREAT SERPL: 11.1 (ref 7–25)
CALCIUM SPEC-SCNC: 8 MG/DL (ref 8.6–10.5)
CHLORIDE SERPL-SCNC: 104 MMOL/L (ref 98–107)
CLARITY UR: ABNORMAL
CO2 SERPL-SCNC: 23.6 MMOL/L (ref 22–29)
COLOR UR: ABNORMAL
CREAT SERPL-MCNC: 1.99 MG/DL (ref 0.57–1)
DEPRECATED RDW RBC AUTO: 57.1 FL (ref 37–54)
EGFRCR SERPLBLD CKD-EPI 2021: 25.8 ML/MIN/1.73
EOSINOPHIL # BLD AUTO: 0.16 10*3/MM3 (ref 0–0.4)
EOSINOPHIL NFR BLD AUTO: 1.6 % (ref 0.3–6.2)
ERYTHROCYTE [DISTWIDTH] IN BLOOD BY AUTOMATED COUNT: 15.1 % (ref 12.3–15.4)
GLOBULIN UR ELPH-MCNC: 1.7 GM/DL
GLUCOSE SERPL-MCNC: 101 MG/DL (ref 65–99)
GLUCOSE UR STRIP-MCNC: NEGATIVE MG/DL
HCT VFR BLD AUTO: 47.6 % (ref 34–46.6)
HGB BLD-MCNC: 13.8 G/DL (ref 12–15.9)
HGB UR QL STRIP.AUTO: NEGATIVE
HOLD SPECIMEN: NORMAL
HYALINE CASTS UR QL AUTO: ABNORMAL /LPF
IMM GRANULOCYTES # BLD AUTO: 0.06 10*3/MM3 (ref 0–0.05)
IMM GRANULOCYTES NFR BLD AUTO: 0.6 % (ref 0–0.5)
KETONES UR QL STRIP: ABNORMAL
LEUKOCYTE ESTERASE UR QL STRIP.AUTO: ABNORMAL
LYMPHOCYTES # BLD AUTO: 2.92 10*3/MM3 (ref 0.7–3.1)
LYMPHOCYTES NFR BLD AUTO: 29.3 % (ref 19.6–45.3)
MCH RBC QN AUTO: 29.2 PG (ref 26.6–33)
MCHC RBC AUTO-ENTMCNC: 29 G/DL (ref 31.5–35.7)
MCV RBC AUTO: 100.6 FL (ref 79–97)
MONOCYTES # BLD AUTO: 0.76 10*3/MM3 (ref 0.1–0.9)
MONOCYTES NFR BLD AUTO: 7.6 % (ref 5–12)
NEUTROPHILS NFR BLD AUTO: 6 10*3/MM3 (ref 1.7–7)
NEUTROPHILS NFR BLD AUTO: 60.3 % (ref 42.7–76)
NITRITE UR QL STRIP: NEGATIVE
NRBC BLD AUTO-RTO: 0 /100 WBC (ref 0–0.2)
PH UR STRIP.AUTO: <=5 [PH] (ref 4.5–8)
PLATELET # BLD AUTO: 298 10*3/MM3 (ref 140–450)
PMV BLD AUTO: 10.7 FL (ref 6–12)
POTASSIUM SERPL-SCNC: 4.3 MMOL/L (ref 3.5–5.2)
PROT SERPL-MCNC: 5.4 G/DL (ref 6–8.5)
PROT UR QL STRIP: ABNORMAL
RBC # BLD AUTO: 4.73 10*6/MM3 (ref 3.77–5.28)
RBC # UR STRIP: ABNORMAL /HPF
REF LAB TEST METHOD: ABNORMAL
SODIUM SERPL-SCNC: 138 MMOL/L (ref 136–145)
SP GR UR STRIP: 1.03 (ref 1–1.03)
SQUAMOUS #/AREA URNS HPF: ABNORMAL /HPF
TROPONIN T SERPL HS-MCNC: 40 NG/L
TROPONIN T SERPL HS-MCNC: 41 NG/L
TSH SERPL DL<=0.05 MIU/L-ACNC: 1.39 UIU/ML (ref 0.27–4.2)
UROBILINOGEN UR QL STRIP: ABNORMAL
WBC # UR STRIP: ABNORMAL /HPF
WBC NRBC COR # BLD AUTO: 9.96 10*3/MM3 (ref 3.4–10.8)

## 2024-01-18 PROCEDURE — 99283 EMERGENCY DEPT VISIT LOW MDM: CPT

## 2024-01-18 PROCEDURE — 81001 URINALYSIS AUTO W/SCOPE: CPT | Performed by: EMERGENCY MEDICINE

## 2024-01-18 PROCEDURE — 84484 ASSAY OF TROPONIN QUANT: CPT | Performed by: EMERGENCY MEDICINE

## 2024-01-18 PROCEDURE — 80053 COMPREHEN METABOLIC PANEL: CPT | Performed by: EMERGENCY MEDICINE

## 2024-01-18 PROCEDURE — 25810000003 SODIUM CHLORIDE 0.9 % SOLUTION: Performed by: EMERGENCY MEDICINE

## 2024-01-18 PROCEDURE — 93010 ELECTROCARDIOGRAM REPORT: CPT | Performed by: INTERNAL MEDICINE

## 2024-01-18 PROCEDURE — 87086 URINE CULTURE/COLONY COUNT: CPT | Performed by: EMERGENCY MEDICINE

## 2024-01-18 PROCEDURE — 96360 HYDRATION IV INFUSION INIT: CPT

## 2024-01-18 PROCEDURE — 36415 COLL VENOUS BLD VENIPUNCTURE: CPT

## 2024-01-18 PROCEDURE — 96361 HYDRATE IV INFUSION ADD-ON: CPT

## 2024-01-18 PROCEDURE — 85025 COMPLETE CBC W/AUTO DIFF WBC: CPT | Performed by: EMERGENCY MEDICINE

## 2024-01-18 PROCEDURE — 84443 ASSAY THYROID STIM HORMONE: CPT | Performed by: EMERGENCY MEDICINE

## 2024-01-18 PROCEDURE — 93005 ELECTROCARDIOGRAM TRACING: CPT | Performed by: EMERGENCY MEDICINE

## 2024-01-18 RX ORDER — CEFUROXIME AXETIL 250 MG/1
250 TABLET ORAL ONCE
Status: COMPLETED | OUTPATIENT
Start: 2024-01-18 | End: 2024-01-18

## 2024-01-18 RX ORDER — CEFUROXIME AXETIL 250 MG/1
250 TABLET ORAL 2 TIMES DAILY
Qty: 10 TABLET | Refills: 0 | Status: SHIPPED | OUTPATIENT
Start: 2024-01-18

## 2024-01-18 RX ADMIN — SODIUM CHLORIDE 1000 ML: 9 INJECTION, SOLUTION INTRAVENOUS at 18:07

## 2024-01-18 RX ADMIN — CEFUROXIME AXETIL 250 MG: 250 TABLET, FILM COATED ORAL at 19:31

## 2024-01-18 NOTE — ED PROVIDER NOTES
Subjective   History of Present Illness    Chief complaint: Dizziness    Location: While driving    Quality/Severity: Mild    Timing/Onset/Duration: Present since November    Modifying Factors: Worse when she goes to stand up    Associated Symptoms: No headache.  No fever chills or cough.  No sore throat earache or nasal congestion.  No chest pain or shortness of breath.  No abdominal pain.  No diarrhea or burning when she urinates.  The patient denies any black or bloody stools.    Narrative: This 73-year-old presents with dizziness while driving her  today.  States she gets dizzy when she stands up and has a history of low blood pressure since November but has not followed up with her PCP and continues to take antihypertensive.  The patient has not had any new medicines or dosage changes.  She relates she has been feeling anxious about her 's health and this may be part of her symptomatology.    PCP:Chey Ledezma MD   Nephrology:  [facility]    Review of Systems   Constitutional:  Negative for chills and fever.   HENT:  Negative for congestion, ear pain and sore throat.    Respiratory:  Negative for cough and shortness of breath.    Cardiovascular:  Negative for chest pain.   Gastrointestinal:  Negative for abdominal pain, diarrhea, nausea and vomiting.   Genitourinary:  Negative for dysuria.   Musculoskeletal:  Negative for back pain.   Skin:  Negative for rash.   Neurological:  Negative for headaches.         Past Medical History:   Diagnosis Date   • Agatston CAC score, <100     2017: 47   • Anemia    • Arthritis    • Broken bones    • Chronic kidney disease    • Depression    • Depression    • False positive stress test     2017 -- coronary artery CTA showed no significant CAD   • GERD (gastroesophageal reflux disease)    • Headache     occ   • Hearing loss     asim hearing aids   • History of blood clots     pt states no   • History of cardiovascular stress test     normal Cardiolite 9/2016   •  Hypertension    • Hyperthyroidism    • Hypoglycemia     Rebound hypoglycemia   • Hypothyroid    • Hypothyroidism    • Incisional hernia     s/p surgical repair, 2014   • Incontinence in female    • Lower back pain    • Morbid obesity     s/p remote gastric bypass   • Osteoarthritis    • Sinus bradycardia    • UTI (urinary tract infection)    • Varicose veins        Allergies   Allergen Reactions   • Baclofen Dizziness and Arrhythmia   • Morphine Itching and Other (See Comments)     Chest tightness, Abdominal Pain   • Nsaids Other (See Comments)     DUE TO CKD STAGE III   • Flagyl [Metronidazole] Other (See Comments)     headaches   • Lodine [Etodolac] Hives     Blotches under the skin   • Macrodantin [Nitrofurantoin] Other (See Comments)     headaches   • Other Rash     LODINE  BLOTCHES ON SKIN        • Tetracyclines & Related Hives and Rash       Past Surgical History:   Procedure Laterality Date   • ANKLE ARTHRODESIS Right 01/23/2019    Right open ankle arthrodesis, harvest of proximal tibial bone graft as major graft, exostectomy, medial great toe-Dr. Santy Romero   • BACK SURGERY     • BLADDER SUSPENSION     • CARPAL TUNNEL RELEASE Left    • CHOLECYSTECTOMY     • COLONOSCOPY N/A 11/3/2017    Procedure: COLONOSCOPY; polypectomy;  Surgeon: Chevy Zuniga MD;  Location: [facility];  Service:    • COLONOSCOPY N/A 03/25/2007    Normal-Dr. Timoteo Prado   • COLONOSCOPY W/ POLYPECTOMY N/A 1/4/2023    Procedure: COLONOSCOPY WITH POLYPECTOMY;  Surgeon: Chevy Zuniga MD;  Location: [facility];  Service: Gastroenterology;  Laterality: N/A;  ascending polyp x1   • EXTERNAL FIXATOR APPLICATION     • EYE SURGERY  08/26/2020    Dr. Avalos   • GASTRIC BYPASS      1980s   • HERNIA REPAIR  2014   • LUMBAR FUSION Bilateral 02/04/2021   • NEPHRECTOMY PARTIAL Left 3/9/2022    Procedure: NEPHRECTOMY PARTIAL LAPAROSCOPIC;  Surgeon: Yves Hawley MD;  Location: [facility];  Service: Urology;   Laterality: Left;   • ORIF TIBIA/FIBULA FRACTURES Right 2005   • PARATHYROIDECTOMY N/A 11/20/2019    Procedure: left inferior parathyroid mediastinal resection and right inferior parathyroid resection, thyroid mass resection;  Surgeon: Gianna Knowles MD;  Location: [facility] OR;  Service: General   • TOTAL HIP ARTHROPLASTY Right 07/2021   • UPPER GASTROINTESTINAL ENDOSCOPY N/A 03/25/2007    No gross lesions in the esophagus, non-bleeding erythematous gastropathy, antrectomy with a small gastric pouch remnant, Billroth II anatomy with a gastrojejunostomy-Dr. Timoteo Prado       Family History   Problem Relation Age of Onset   • Aortic aneurysm Father         abdominal   • Heart disease Father    • Hypertension Brother    • Stroke Brother    • Intracerebral hemorrhage Brother    • Heart disease Brother    • Heart disease Maternal Grandfather    • Stroke Paternal Grandmother    • Heart disease Paternal Grandfather    • Diabetes Mother    • Heart disease Mother    • Malig Hyperthermia Neg Hx        Social History     Socioeconomic History   • Marital status:    Tobacco Use   • Smoking status: Never   • Smokeless tobacco: Never   • Tobacco comments:     secondary smoke   Vaping Use   • Vaping Use: Never used   Substance and Sexual Activity   • Alcohol use: No     Comment: very seldom   • Drug use: No   • Sexual activity: Not Currently     Partners: Male     Birth control/protection: Post-menopausal, None     Comment:  is impotent           Objective   Physical Exam  Vitals (The temperature is 98.5 °F, pulse 74, respirations 16, BP 92/62, room air pulse ox 98%.) and nursing note reviewed.   Constitutional:       Appearance: Normal appearance.   HENT:      Head: Normocephalic and atraumatic.      Right Ear: Tympanic membrane normal.      Left Ear: Tympanic membrane normal.      Nose: Nose normal.      Mouth/Throat:      Mouth: Mucous membranes are moist.   Cardiovascular:      Rate and Rhythm: Normal rate  and regular rhythm.      Pulses: Normal pulses.      Heart sounds: Normal heart sounds. No murmur heard.     No friction rub. No gallop.   Pulmonary:      Effort: Pulmonary effort is normal.      Breath sounds: Normal breath sounds.   Abdominal:      General: Abdomen is flat. Bowel sounds are normal. There is no distension.      Palpations: Abdomen is soft. There is no mass.      Tenderness: There is no abdominal tenderness. There is no guarding or rebound.      Hernia: No hernia is present.   Musculoskeletal:         General: Normal range of motion.      Cervical back: Normal range of motion and neck supple.   Skin:     General: Skin is warm and dry.   Neurological:      General: No focal deficit present.      Mental Status: She is alert and oriented to person, place, and time.      Cranial Nerves: No cranial nerve deficit.      Sensory: No sensory deficit.      Motor: No weakness.      Coordination: Coordination normal.      Gait: Gait normal.       Procedures           ED Course  ED Course as of 01/18/24 1949   Thu Jan 18, 2024   1848 The white blood cell count is 9.96, hemoglobin 13, hematocrit 47, platelet count 298,000, CBC otherwise unremarkable. [RC]   1916 The high-sensitivity troponin is 41 and elevated. [RC]   1916 The TSH is 1.39 and normal. [RC]   1916 The urine microscopic shows no RBCs, 11-20 WBCs, 3+ bacteria, moderate leukocytes, 30 mg/dL protein, small bilirubin, 50 mg/dL ketones, otherwise unremarkable. [RC]   1916 The hematocrit is 47, the CBC is otherwise unremarkable. [RC]   1949 The 2-hour troponin is 40 with a delta T of -1. [RC]      ED Course User Index  [RC] Marlon Vivar MD      17:41 EST, 01/18/24:  The EKG was obtained at 1714 and read by me at 1716.  The EKG shows a normal sinus rhythm with rate of 69.  There is a normal axis with no hypertrophy.  The OR, QRS, and QT intervals are unremarkable.  There is no ectopy.  There is no acute ST elevation or depression.                            17:41 EST, 01/18/24:  The orthostatics are positive.  19:28 EST, 01/18/24:  The repeat blood pressure is 132/74.  Abdominal exam: Soft, nontender, no masses, positive bowel sounds.  Neurologic exam: Conscious alert and oriented x 4 with no focal deficits noted.    19:50 EST, 01/18/24:  Diagnosis of hypotension and acute UTI was discussed with her.  The patient should stop her lisinopril.  She should drink plenty of fluids.  The patient will be given a prescription for Ceftin.  The patient should call Dr. Foster in the morning for a follow-up appointment within 1 week.  The patient should return to the emergency department if there is increased dizziness, vomiting, worse in any way at all.  All the patient questions answered she will be discharged in good condition.          Medical Decision Making  Amount and/or Complexity of Data Reviewed  Labs: ordered.  ECG/medicine tests: ordered.    Risk  Prescription drug management.        Final diagnoses:   Other specified hypotension   Acute UTI       ED Disposition  ED Disposition       None            No follow-up provider specified.       Medication List      No changes were made to your prescriptions during this visit.            Marlon Vivar MD  01/18/24 1928       Marlon Vivar MD  01/18/24 2235       Marlon Vivar MD  01/18/24 2233

## 2024-01-18 NOTE — ED NOTES
Patient unable to urinate at this time for urine specimen. Per MD ok for patient to have a drink. Patient given water.

## 2024-01-19 LAB
BACTERIA SPEC AEROBE CULT: NORMAL
QT INTERVAL: 395 MS
QTC INTERVAL: 425 MS

## 2024-01-19 NOTE — DISCHARGE INSTRUCTIONS
Take the Ceftin as prescribed.  Hold your lisinopril.  Take your blood pressure couple times a day and write down the time evaluation obtain and take this with you to Dr. Ledezma's office.  Call Dr. Ledezma in the morning for a follow-up appointment within 1 week.  Return to the emergency department if there is increased dizziness, vomiting, worse in any way at all.  Drink plenty of fluids.

## 2024-04-02 ENCOUNTER — HOSPITAL ENCOUNTER (OUTPATIENT)
Dept: CT IMAGING | Facility: HOSPITAL | Age: 74
Discharge: HOME | End: 2024-04-02
Admitting: UROLOGY
Payer: MEDICARE

## 2024-04-02 DIAGNOSIS — C64.2: ICD-10-CM

## 2024-04-02 PROCEDURE — 25510000001 IOPAMIDOL 61 % SOLUTION: Performed by: UROLOGY

## 2024-04-02 PROCEDURE — 74178 CT ABD&PLV WO CNTR FLWD CNTR: CPT

## 2024-04-02 PROCEDURE — 71260 CT THORAX DX C+: CPT

## 2024-04-02 RX ADMIN — IOPAMIDOL 100 ML: 612 INJECTION, SOLUTION INTRAVENOUS at 13:05

## 2024-04-15 LAB — CREAT BLDA-MCNC: 1.3 MG/DL (ref 0.6–1.3)

## (undated) DEVICE — DRAPE,REIN 53X77,STERILE: Brand: MEDLINE

## (undated) DEVICE — PK ENT MAJ 40

## (undated) DEVICE — SUT VIC 3/0 TIES 18IN J110T

## (undated) DEVICE — IRRIGATOR BULB ASEPTO 60CC STRL

## (undated) DEVICE — Device

## (undated) DEVICE — BW-412T DISP COMBO CLEANING BRUSH: Brand: SINGLE USE COMBINATION CLEANING BRUSH

## (undated) DEVICE — PK LAP GEN 90

## (undated) DEVICE — JACKT LAB F/R KNIT CUFF/COLR XLG BLU

## (undated) DEVICE — ENDOCUT SCISSOR TIP, DISPOSABLE: Brand: RENEW

## (undated) DEVICE — GLV SURG NEOLON 2G PF LF 7.5 STRL

## (undated) DEVICE — FRCP BX RADJAW4 NDL 2.8 240CM LG OG BX40

## (undated) DEVICE — SYR LL 3CC

## (undated) DEVICE — TROCARS: Brand: KII® BALLOON BLUNT TIP SYSTEM

## (undated) DEVICE — DRSNG TELFA PAD NONADH STR 1S 3X4IN

## (undated) DEVICE — SUT VIC 5/0 PS2 18IN J495H

## (undated) DEVICE — 2, DISPOSABLE SUCTION/IRRIGATOR WITH DISPOSABLE TIP: Brand: STRYKEFLOW

## (undated) DEVICE — SUT SILK 2/0 FS BLK 18IN 685G

## (undated) DEVICE — GLV SURG BIOGEL LTX PF 6 1/2

## (undated) DEVICE — SYS BALN KII DISSCT RND C0K12

## (undated) DEVICE — SPNG LAP 18X18IN LF STRL PK/5

## (undated) DEVICE — Device: Brand: DEFENDO AIR/WATER/SUCTION AND BIOPSY VALVE

## (undated) DEVICE — TRY CATH FOL LUBRICATH W/BAG 16F 2000ML

## (undated) DEVICE — SUT VIC 3/0 CT2 27IN J232H

## (undated) DEVICE — TP SILK DURAPORE 2 IN

## (undated) DEVICE — ADHS SKIN PREMIERPRO EXOFIN TOPICAL HI/VISC .5ML

## (undated) DEVICE — APPL CHLORAPREP HI/LITE 26ML ORNG

## (undated) DEVICE — SPONGE,DRAIN,NONWVN,4"X4",6PLY,STRL,LF: Brand: MEDLINE

## (undated) DEVICE — LAPAROSCOPIC SMOKE FILTRATION SYSTEM: Brand: PALL LAPAROSHIELD® PLUS LAPAROSCOPIC SMOKE FILTRATION SYSTEM

## (undated) DEVICE — VIAL FORMALIN CAP 10P 40ML

## (undated) DEVICE — RESERVOIR,SUCTION,100CC,SILICONE: Brand: MEDLINE

## (undated) DEVICE — SKIN PREP TRAY W/CHG: Brand: MEDLINE INDUSTRIES, INC.

## (undated) DEVICE — TBG INSUFL W FLTR STRL

## (undated) DEVICE — SUT VIC 0 CT1 36IN J946H

## (undated) DEVICE — COVER,MAYO STAND,STERILE: Brand: MEDLINE

## (undated) DEVICE — GOWN ISOL W/THUMB UNIV BLU BX/15

## (undated) DEVICE — HARMONIC ACE +7 LAPAROSCOPIC SHEARS ADVANCED HEMOSTASIS 5MM DIAMETER 36CM SHAFT LENGTH  FOR USE WITH GRAY HAND PIECE ONLY: Brand: HARMONIC ACE

## (undated) DEVICE — APPL HEMOS FOR DELIVERY FLOSEAL

## (undated) DEVICE — DISPOSABLE BIPOLAR FORCEPS 4" (10.2CM) JEWELERS, STRAIGHT 0.4MM TIP AND 12 FT. (3.6M) CABLE: Brand: KIRWAN

## (undated) DEVICE — SAFELINER SUCTION CANISTER 1000CC: Brand: DEROYAL

## (undated) DEVICE — TOWEL,OR,DSP,ST,BLUE,STD,4/PK,20PK/CS: Brand: MEDLINE

## (undated) DEVICE — SUT VIC 2/0 TIES 18IN J111T

## (undated) DEVICE — MASK,FACE,SHIELD,BLUE,ANTI FOG,TIES: Brand: MEDLINE

## (undated) DEVICE — STPLR SKIN VISISTAT WD 35CT

## (undated) DEVICE — SOL IRR H2O BTL 1000ML STRL

## (undated) DEVICE — GLV SURG SENSICARE W/ALOE PF LF 7 STRL

## (undated) DEVICE — KT ORCA ORCAPOD DISP STRL

## (undated) DEVICE — SUT VIC 2/0 CT2 27IN J269H

## (undated) DEVICE — IMMOB HD UNIV CLR DISP

## (undated) DEVICE — ADAPT CLN BIOGUARD AIR/H2O DISP

## (undated) DEVICE — ADHS SKIN DERMABOND TOP ADVANCED

## (undated) DEVICE — ELECTRD BLD EDGE/INSUL1P 2.4X5.1MM STRL

## (undated) DEVICE — ENDOPATH XCEL BLADELESS TROCARS WITH STABILITY SLEEVES: Brand: ENDOPATH XCEL

## (undated) DEVICE — GLV SURG SENSICARE PI MIC PF SZ7.5 LF STRL

## (undated) DEVICE — UNDYED BRAIDED (POLYGLACTIN 910), SYNTHETIC ABSORBABLE SUTURE: Brand: COATED VICRYL

## (undated) DEVICE — PATIENT RETURN ELECTRODE, SINGLE-USE, CONTACT QUALITY MONITORING, ADULT, WITH 9FT CORD, FOR PATIENTS WEIGING OVER 33LBS. (15KG): Brand: MEGADYNE

## (undated) DEVICE — ENDOPOUCH RETRIEVER SPECIMEN RETRIEVAL BAGS: Brand: ENDOPOUCH RETRIEVER

## (undated) DEVICE — NDL HYPO ECLPS SFTY 22G 1 1/2IN

## (undated) DEVICE — SUCTION CANISTER, 3000CC,SAFELINER: Brand: DEROYAL

## (undated) DEVICE — MAGNETIC DRAPE: Brand: DEVON

## (undated) DEVICE — SPNG GZ WOVN 4X4IN 12PLY 10/BX STRL